# Patient Record
Sex: FEMALE | Race: WHITE | HISPANIC OR LATINO | Employment: FULL TIME | URBAN - METROPOLITAN AREA
[De-identification: names, ages, dates, MRNs, and addresses within clinical notes are randomized per-mention and may not be internally consistent; named-entity substitution may affect disease eponyms.]

---

## 2019-02-08 ENCOUNTER — HOSPITAL ENCOUNTER (EMERGENCY)
Facility: MEDICAL CENTER | Age: 29
End: 2019-02-08
Attending: EMERGENCY MEDICINE
Payer: COMMERCIAL

## 2019-02-08 ENCOUNTER — APPOINTMENT (OUTPATIENT)
Dept: RADIOLOGY | Facility: MEDICAL CENTER | Age: 29
End: 2019-02-08
Payer: COMMERCIAL

## 2019-02-08 ENCOUNTER — APPOINTMENT (OUTPATIENT)
Dept: RADIOLOGY | Facility: MEDICAL CENTER | Age: 29
End: 2019-02-08
Attending: EMERGENCY MEDICINE
Payer: COMMERCIAL

## 2019-02-08 VITALS
DIASTOLIC BLOOD PRESSURE: 120 MMHG | WEIGHT: 160 LBS | RESPIRATION RATE: 19 BRPM | HEIGHT: 64 IN | SYSTOLIC BLOOD PRESSURE: 194 MMHG | HEART RATE: 92 BPM | OXYGEN SATURATION: 98 % | TEMPERATURE: 97.9 F | BODY MASS INDEX: 27.31 KG/M2

## 2019-02-08 DIAGNOSIS — R07.89 CHEST WALL PAIN: ICD-10-CM

## 2019-02-08 DIAGNOSIS — R07.81 PLEURITIC CHEST PAIN: ICD-10-CM

## 2019-02-08 LAB
ALBUMIN SERPL BCP-MCNC: 5 G/DL (ref 3.2–4.9)
ALBUMIN/GLOB SERPL: 1.5 G/DL
ALP SERPL-CCNC: 48 U/L (ref 30–99)
ALT SERPL-CCNC: 12 U/L (ref 2–50)
ANION GAP SERPL CALC-SCNC: 13 MMOL/L (ref 0–11.9)
APTT PPP: 28.6 SEC (ref 24.7–36)
AST SERPL-CCNC: 22 U/L (ref 12–45)
BASOPHILS # BLD AUTO: 0.6 % (ref 0–1.8)
BASOPHILS # BLD: 0.07 K/UL (ref 0–0.12)
BILIRUB SERPL-MCNC: 0.9 MG/DL (ref 0.1–1.5)
BNP SERPL-MCNC: 3 PG/ML (ref 0–100)
BUN SERPL-MCNC: 10 MG/DL (ref 8–22)
CALCIUM SERPL-MCNC: 9.2 MG/DL (ref 8.5–10.5)
CHLORIDE SERPL-SCNC: 104 MMOL/L (ref 96–112)
CO2 SERPL-SCNC: 22 MMOL/L (ref 20–33)
CREAT SERPL-MCNC: 0.66 MG/DL (ref 0.5–1.4)
D DIMER PPP IA.FEU-MCNC: <0.4 UG/ML (FEU) (ref 0–0.5)
EKG IMPRESSION: NORMAL
EKG IMPRESSION: NORMAL
EOSINOPHIL # BLD AUTO: 0.08 K/UL (ref 0–0.51)
EOSINOPHIL NFR BLD: 0.7 % (ref 0–6.9)
ERYTHROCYTE [DISTWIDTH] IN BLOOD BY AUTOMATED COUNT: 46.4 FL (ref 35.9–50)
GLOBULIN SER CALC-MCNC: 3.4 G/DL (ref 1.9–3.5)
GLUCOSE SERPL-MCNC: 119 MG/DL (ref 65–99)
HCG SERPL QL: NEGATIVE
HCT VFR BLD AUTO: 45.4 % (ref 37–47)
HGB BLD-MCNC: 14.7 G/DL (ref 12–16)
IMM GRANULOCYTES # BLD AUTO: 0.04 K/UL (ref 0–0.11)
IMM GRANULOCYTES NFR BLD AUTO: 0.4 % (ref 0–0.9)
INR PPP: 1.01 (ref 0.87–1.13)
LIPASE SERPL-CCNC: 6 U/L (ref 11–82)
LYMPHOCYTES # BLD AUTO: 3.57 K/UL (ref 1–4.8)
LYMPHOCYTES NFR BLD: 32.6 % (ref 22–41)
MCH RBC QN AUTO: 26.9 PG (ref 27–33)
MCHC RBC AUTO-ENTMCNC: 32.4 G/DL (ref 33.6–35)
MCV RBC AUTO: 83 FL (ref 81.4–97.8)
MONOCYTES # BLD AUTO: 0.69 K/UL (ref 0–0.85)
MONOCYTES NFR BLD AUTO: 6.3 % (ref 0–13.4)
NEUTROPHILS # BLD AUTO: 6.49 K/UL (ref 2–7.15)
NEUTROPHILS NFR BLD: 59.4 % (ref 44–72)
NRBC # BLD AUTO: 0 K/UL
NRBC BLD-RTO: 0 /100 WBC
PLATELET # BLD AUTO: 281 K/UL (ref 164–446)
PMV BLD AUTO: 11.8 FL (ref 9–12.9)
POTASSIUM SERPL-SCNC: 3.7 MMOL/L (ref 3.6–5.5)
PROT SERPL-MCNC: 8.4 G/DL (ref 6–8.2)
PROTHROMBIN TIME: 13.4 SEC (ref 12–14.6)
RBC # BLD AUTO: 5.47 M/UL (ref 4.2–5.4)
SODIUM SERPL-SCNC: 139 MMOL/L (ref 135–145)
TROPONIN I SERPL-MCNC: <0.01 NG/ML (ref 0–0.04)
TSH SERPL DL<=0.005 MIU/L-ACNC: 5.76 UIU/ML (ref 0.38–5.33)
WBC # BLD AUTO: 10.9 K/UL (ref 4.8–10.8)

## 2019-02-08 PROCEDURE — 84443 ASSAY THYROID STIM HORMONE: CPT

## 2019-02-08 PROCEDURE — 85730 THROMBOPLASTIN TIME PARTIAL: CPT

## 2019-02-08 PROCEDURE — 83880 ASSAY OF NATRIURETIC PEPTIDE: CPT

## 2019-02-08 PROCEDURE — 93005 ELECTROCARDIOGRAM TRACING: CPT

## 2019-02-08 PROCEDURE — 96374 THER/PROPH/DIAG INJ IV PUSH: CPT

## 2019-02-08 PROCEDURE — 85379 FIBRIN DEGRADATION QUANT: CPT

## 2019-02-08 PROCEDURE — 85025 COMPLETE CBC W/AUTO DIFF WBC: CPT

## 2019-02-08 PROCEDURE — 700111 HCHG RX REV CODE 636 W/ 250 OVERRIDE (IP): Performed by: EMERGENCY MEDICINE

## 2019-02-08 PROCEDURE — 85610 PROTHROMBIN TIME: CPT

## 2019-02-08 PROCEDURE — 84484 ASSAY OF TROPONIN QUANT: CPT

## 2019-02-08 PROCEDURE — 84703 CHORIONIC GONADOTROPIN ASSAY: CPT

## 2019-02-08 PROCEDURE — A9270 NON-COVERED ITEM OR SERVICE: HCPCS | Performed by: EMERGENCY MEDICINE

## 2019-02-08 PROCEDURE — 71046 X-RAY EXAM CHEST 2 VIEWS: CPT

## 2019-02-08 PROCEDURE — 93005 ELECTROCARDIOGRAM TRACING: CPT | Performed by: EMERGENCY MEDICINE

## 2019-02-08 PROCEDURE — 700102 HCHG RX REV CODE 250 W/ 637 OVERRIDE(OP): Performed by: EMERGENCY MEDICINE

## 2019-02-08 PROCEDURE — 80053 COMPREHEN METABOLIC PANEL: CPT

## 2019-02-08 PROCEDURE — 99285 EMERGENCY DEPT VISIT HI MDM: CPT

## 2019-02-08 PROCEDURE — 83690 ASSAY OF LIPASE: CPT

## 2019-02-08 RX ORDER — KETOROLAC TROMETHAMINE 30 MG/ML
15 INJECTION, SOLUTION INTRAMUSCULAR; INTRAVENOUS ONCE
Status: COMPLETED | OUTPATIENT
Start: 2019-02-08 | End: 2019-02-08

## 2019-02-08 RX ORDER — ASPIRIN 81 MG/1
324 TABLET, CHEWABLE ORAL ONCE
Status: COMPLETED | OUTPATIENT
Start: 2019-02-08 | End: 2019-02-08

## 2019-02-08 RX ADMIN — ASPIRIN 81 MG 324 MG: 81 TABLET ORAL at 20:13

## 2019-02-08 RX ADMIN — KETOROLAC TROMETHAMINE 15 MG: 30 INJECTION INTRAMUSCULAR; INTRAVENOUS at 20:13

## 2019-02-08 ASSESSMENT — PAIN DESCRIPTION - DESCRIPTORS: DESCRIPTORS: SHARP;BURNING

## 2019-02-09 NOTE — ED TRIAGE NOTES
"Chief Complaint   Patient presents with   • Chest Pain     upper L side chest/shoulder pain that radiates down upper back     Pt ambulatory to R6 with family at side. Pt states the pain started around 1900 last night. Pt states it feels like \"pressure on my chest when I lay back\". Pt c/o tightness in her chest as well. Pt smokes a pack/3days. Pt states pain is getting tighter.     "

## 2019-02-09 NOTE — ED NOTES
piv dc. Bleeding controlled. Given dc inst. No rx. Verbalizes f/u plan. Ambulatory to cuauhtemoc fu steady gait c family.

## 2019-02-09 NOTE — ED PROVIDER NOTES
ED Provider Note    CHIEF COMPLAINT  Chief Complaint   Patient presents with   • Chest Pain     upper L side chest/shoulder pain that radiates down upper back       HPI  Melina Mcmahan is a 28 y.o. female who presents to the emergency department chief complaint of a day and a half of left chest discomfort that radiates up to the shoulder.  She associates this with the pleuritic discomfort and worsening pain with a deep breath.  She does also associate worsening pain with movement.  She denies any recent trauma to the area.  She denies any leg swelling.  She denies any overt shortness of breath but feels like her heart is racing.  She denies any hemoptysis or recent surgeries.  She does currently smoke cigarettes about a pack a week.  States she was diagnosed with hypertension the past but does not take any medications for this.  Did not take anything prior to arrival besides Motrin that did not help with the discomfort.  Pain is currently 7 out of 10 and sharp in nature.    REVIEW OF SYSTEMS  Positives as above. Pertinent negatives include nausea vomiting leg swelling hemoptysis fevers chills recent illness trauma  All other review of systems are negative    PAST MEDICAL HISTORY   has a past medical history of Hypertension ([id number]).    SOCIAL HISTORY  Social History     Social History Main Topics   • Smoking status: Current Some Day Smoker     Packs/day: 0.25     Years: 3.00     Types: Cigarettes     Last attempt to quit: 3/19/2013   • Smokeless tobacco: Never Used      Comment: 1 cig dialy    • Alcohol use No   • Drug use: No   • Sexual activity: Yes     Partners: Male     Birth control/ protection: Condom, Pill      Comment: FOB involved and supportive.       SURGICAL HISTORY   has a past surgical history that includes other abdominal surgery; dilation and curettage (2013); primary c section (2006); and repeat c section w tubal ligation (2/20/2014).    CURRENT MEDICATIONS  Home Medications    **Home  "medications have not yet been reviewed for this encounter**         ALLERGIES  Allergies   Allergen Reactions   • Nkda [No Known Drug Allergy]        PHYSICAL EXAM  VITAL SIGNS: BP (!) 194/120   Pulse (!) 116   Temp 36.6 °C (97.9 °F) (Temporal)   Resp 19   Ht 1.626 m (5' 4\")   Wt 72.6 kg (160 lb)   SpO2 99%   BMI 27.46 kg/m²    Pulse ox interpretation: I interpret this pulse ox as normal.  Constitutional: Alert in mild distress  HENT: Normocephalic atraumatic, MMM  Eyes: PER, Conjunctiva normal, Non-icteric.   Neck: Normal range of motion, No tenderness, Supple, No stridor.   Cardiovascular: Regular rhythm, tachycardia no murmurs.   Thorax & Lungs: Normal breath sounds, No respiratory distress, No wheezing, L chest wall ttp on exam,, reproducible of pain   Abdomen: Bowel sounds normal, Soft, No tenderness, No pulsatile masses. No peritoneal signs.  Skin: Warm, Dry, No erythema, No rash.   Back: No bony tenderness, No CVA tenderness.   Extremities: Intact distal pulses, No edema, No tenderness, No cyanosis  Neurologic: Alert and oriented x3, No focal deficits noted.       DIFFERENTIAL DIAGNOSIS AND WORK UP PLAN    This is a 28 y.o. female who presents with left-sided pleuritic chest pain in the setting of tachycardia and hypertension concern for atypical ACS pulmonary embolism costochondritis pleural effusion spontaneous pneumothorax.  She is now hypoxic and otherwise well-appearing.  Because she is tachycardic cannot rule her out by PERC criteria she will receive a d-dimer aspirin pain management and reevaluation.    DIAGNOSTIC STUDIES / PROCEDURES    EKG  Results for orders placed or performed during the hospital encounter of 02/08/19   EKG (NOW)   Result Value Ref Range    Report       [facility] Emergency Dept.    Test Date:  2019-02-08  Pt Name:    MADDY FERREIRA                Department: ER  MRN:        [id number]                      Room:  Gender:     Female                       " Technician: [id number]  :        1990                   Requested By:ER TRIAGE PROTOCOL  Order #:    [id number]                    Reading MD: Reshma Soto MD    Measurements  Intervals                                Axis  Rate:       122                          P:          42  DE:         132                          QRS:        16  QRSD:       120                          T:          208  QT:         316  QTc:        451    Interpretive Statements  SINUS TACHYCARDIA  No ST elevation she does have ST depressions in the inferior lateral leads.  No Q  waves questional ST elevation in aVR normal intervals normal axis  No previous ECG available for comparison    Electronically Signed On 2019 20:29:13 PST by Reshma Soto MD     EKG (NOW)   Result Value Ref Range    Report       [facility] Emergency Dept.    Test Date:  2019  Pt Name:    MADDY FERREIRA                Department: ER  MRN:        [id number]                      Room:       [facility]  Gender:     Female                       Technician: [id number]  :        1990                   Requested By:RESHMA SOTO  Order #:    [id number]                    Reading MD:    Measurements  Intervals                                Axis  Rate:       96                           P:          50  DE:         100                          QRS:        14  QRSD:       108                          T:          232  QT:         380  QTc:        481    Interpretive Statements  SINUS RHYTHM  VENTRICULAR PREEXCITATION  BASELINE WANDER IN LEAD(S) V2  Compared to ECG 2019 19:23:13  Ventricular preexcitation now present  Sinus tachycardia no longer present  ST (T wave) deviation no longer present           LABS  Pertinent Lab Findings  CBC within normal limits panel mildly elevated white blood cell count CMP within normal limits lipase normal TSH only mildly elevated normal troponin BMP coags and d-dimer the patient is not  "pregnant      RADIOLOGY  DX-CHEST-2 VIEWS   Final Result      Normal chest.               INTERPRETING LOCATION: [address] [place] [address], [id number]        The radiologist's interpretation of all radiological studies have been reviewed by me.      COURSE & MEDICAL DECISION MAKING  Pertinent Labs & Imaging studies reviewed. (See chart for details)    9:13 PM  Reassess patient at the bedside she is feeling much better after the IV Toradol her heart rate and her blood pressure improved.  Her heart score is 3 there is no evidence of pulmonary embolism her pain is reproducible and improved with the NSAIDs.  I discussed with her continue ice packs and NSAIDs at home and return to the ED for any worsening symptoms and follow-up with primary care provider for elevated blood pressure    /97   Pulse 92   Temp 36.6 °C (97.9 °F) (Temporal)   Resp 19   Ht 1.626 m (5' 4\")   Wt 72.6 kg (160 lb)   SpO2 98%   BMI 27.46 kg/m²       The patient will return for new or worsening symptoms and is stable at the time of discharge.    The patient is referred to a primary physician for blood pressure management, diabetic screening, and for all other preventative health concerns.    DISPOSITION:  Patient will be discharged home in stable condition.    FOLLOW UP:  [address] [address]  [phone]  Schedule an appointment as soon as possible for a visit   for blood pressure and thyroid recheck    [facility], Emergency Dept  [address] [place] [id number] [phone]    If symptoms worsen      OUTPATIENT MEDICATIONS:  Discharge Medication List as of 2/8/2019  9:23 PM          FINAL IMPRESSION  1. Pleuritic chest pain    2. Chest wall pain            Electronically signed by: Reshma Yeboah, 2/8/2019 8:01 PM    This dictation has been created using voice recognition software and/or scribes. The accuracy of the dictation is limited by the abilities of the software " and the expertise of the scribes. I expect there may be some errors of grammar and possibly content. I made every attempt to manually correct the errors within my dictation. However, errors related to voice recognition software and/or scribes may still exist and should be interpreted within the appropriate context.

## 2019-02-09 NOTE — ED NOTES
Pt ambulatory from triage, states pain started last night while at rest. Pt ST @115-125. States per her apple watch that is her normal resting HR. Denies any beck. Denies any diaphoresis or n/v at time of onset. Pt states pain is worse laying back. Placed on monitor. piv estb, labs drawn & sent. Family at bs. Call light inreach. Tbs. Will ctm.

## 2019-02-11 ENCOUNTER — APPOINTMENT (OUTPATIENT)
Dept: RADIOLOGY | Facility: MEDICAL CENTER | Age: 29
End: 2019-02-11
Attending: EMERGENCY MEDICINE
Payer: COMMERCIAL

## 2019-02-11 ENCOUNTER — HOSPITAL ENCOUNTER (EMERGENCY)
Facility: MEDICAL CENTER | Age: 29
End: 2019-02-11
Attending: EMERGENCY MEDICINE
Payer: COMMERCIAL

## 2019-02-11 VITALS
HEIGHT: 64 IN | TEMPERATURE: 98.4 F | SYSTOLIC BLOOD PRESSURE: 176 MMHG | OXYGEN SATURATION: 97 % | BODY MASS INDEX: 29.81 KG/M2 | DIASTOLIC BLOOD PRESSURE: 104 MMHG | HEART RATE: 99 BPM | WEIGHT: 174.6 LBS | RESPIRATION RATE: 19 BRPM

## 2019-02-11 DIAGNOSIS — H20.9 IRITIS OF LEFT EYE: ICD-10-CM

## 2019-02-11 DIAGNOSIS — I10 ESSENTIAL HYPERTENSION: ICD-10-CM

## 2019-02-11 LAB
ALBUMIN SERPL BCP-MCNC: 5 G/DL (ref 3.2–4.9)
ALBUMIN/GLOB SERPL: 1.8 G/DL
ALP SERPL-CCNC: 53 U/L (ref 30–99)
ALT SERPL-CCNC: 11 U/L (ref 2–50)
AMPHET UR QL SCN: NEGATIVE
ANION GAP SERPL CALC-SCNC: 10 MMOL/L (ref 0–11.9)
AST SERPL-CCNC: 17 U/L (ref 12–45)
BARBITURATES UR QL SCN: NEGATIVE
BASOPHILS # BLD AUTO: 0.6 % (ref 0–1.8)
BASOPHILS # BLD: 0.05 K/UL (ref 0–0.12)
BENZODIAZ UR QL SCN: NEGATIVE
BILIRUB SERPL-MCNC: 0.5 MG/DL (ref 0.1–1.5)
BNP SERPL-MCNC: 19 PG/ML (ref 0–100)
BUN SERPL-MCNC: 7 MG/DL (ref 8–22)
BZE UR QL SCN: NEGATIVE
CALCIUM SERPL-MCNC: 9.8 MG/DL (ref 8.5–10.5)
CANNABINOIDS UR QL SCN: NEGATIVE
CHLORIDE SERPL-SCNC: 105 MMOL/L (ref 96–112)
CO2 SERPL-SCNC: 22 MMOL/L (ref 20–33)
CREAT SERPL-MCNC: 0.62 MG/DL (ref 0.5–1.4)
D DIMER PPP IA.FEU-MCNC: <0.4 UG/ML (FEU) (ref 0–0.5)
EOSINOPHIL # BLD AUTO: 0.07 K/UL (ref 0–0.51)
EOSINOPHIL NFR BLD: 0.9 % (ref 0–6.9)
ERYTHROCYTE [DISTWIDTH] IN BLOOD BY AUTOMATED COUNT: 46.2 FL (ref 35.9–50)
GLOBULIN SER CALC-MCNC: 2.8 G/DL (ref 1.9–3.5)
GLUCOSE SERPL-MCNC: 111 MG/DL (ref 65–99)
HCG SERPL QL: NEGATIVE
HCT VFR BLD AUTO: 43 % (ref 37–47)
HGB BLD-MCNC: 13.9 G/DL (ref 12–16)
IMM GRANULOCYTES # BLD AUTO: 0.02 K/UL (ref 0–0.11)
IMM GRANULOCYTES NFR BLD AUTO: 0.3 % (ref 0–0.9)
LYMPHOCYTES # BLD AUTO: 2.95 K/UL (ref 1–4.8)
LYMPHOCYTES NFR BLD: 37.3 % (ref 22–41)
MCH RBC QN AUTO: 27 PG (ref 27–33)
MCHC RBC AUTO-ENTMCNC: 32.3 G/DL (ref 33.6–35)
MCV RBC AUTO: 83.7 FL (ref 81.4–97.8)
METHADONE UR QL SCN: NEGATIVE
MONOCYTES # BLD AUTO: 0.64 K/UL (ref 0–0.85)
MONOCYTES NFR BLD AUTO: 8.1 % (ref 0–13.4)
NEUTROPHILS # BLD AUTO: 4.18 K/UL (ref 2–7.15)
NEUTROPHILS NFR BLD: 52.8 % (ref 44–72)
NRBC # BLD AUTO: 0 K/UL
NRBC BLD-RTO: 0 /100 WBC
OPIATES UR QL SCN: NEGATIVE
OXYCODONE UR QL SCN: NEGATIVE
PCP UR QL SCN: NEGATIVE
PLATELET # BLD AUTO: 275 K/UL (ref 164–446)
PMV BLD AUTO: 11.2 FL (ref 9–12.9)
POTASSIUM SERPL-SCNC: 3.1 MMOL/L (ref 3.6–5.5)
PROPOXYPH UR QL SCN: NEGATIVE
PROT SERPL-MCNC: 7.8 G/DL (ref 6–8.2)
RBC # BLD AUTO: 5.14 M/UL (ref 4.2–5.4)
SODIUM SERPL-SCNC: 137 MMOL/L (ref 135–145)
TROPONIN I SERPL-MCNC: <0.01 NG/ML (ref 0–0.04)
TSH SERPL DL<=0.005 MIU/L-ACNC: 1.99 UIU/ML (ref 0.38–5.33)
WBC # BLD AUTO: 7.9 K/UL (ref 4.8–10.8)

## 2019-02-11 PROCEDURE — 70496 CT ANGIOGRAPHY HEAD: CPT

## 2019-02-11 PROCEDURE — 99284 EMERGENCY DEPT VISIT MOD MDM: CPT

## 2019-02-11 PROCEDURE — 84484 ASSAY OF TROPONIN QUANT: CPT

## 2019-02-11 PROCEDURE — 84703 CHORIONIC GONADOTROPIN ASSAY: CPT

## 2019-02-11 PROCEDURE — 700117 HCHG RX CONTRAST REV CODE 255: Performed by: EMERGENCY MEDICINE

## 2019-02-11 PROCEDURE — 80053 COMPREHEN METABOLIC PANEL: CPT

## 2019-02-11 PROCEDURE — 36415 COLL VENOUS BLD VENIPUNCTURE: CPT

## 2019-02-11 PROCEDURE — 84443 ASSAY THYROID STIM HORMONE: CPT

## 2019-02-11 PROCEDURE — 83880 ASSAY OF NATRIURETIC PEPTIDE: CPT

## 2019-02-11 PROCEDURE — 85379 FIBRIN DEGRADATION QUANT: CPT

## 2019-02-11 PROCEDURE — 80307 DRUG TEST PRSMV CHEM ANLYZR: CPT

## 2019-02-11 PROCEDURE — 85025 COMPLETE CBC W/AUTO DIFF WBC: CPT

## 2019-02-11 RX ADMIN — IOHEXOL 100 ML: 350 INJECTION, SOLUTION INTRAVENOUS at 18:59

## 2019-02-11 ASSESSMENT — LIFESTYLE VARIABLES: DO YOU DRINK ALCOHOL: NO

## 2019-02-12 ENCOUNTER — TELEPHONE (OUTPATIENT)
Dept: SCHEDULING | Facility: IMAGING CENTER | Age: 29
End: 2019-02-12

## 2019-02-12 NOTE — ED TRIAGE NOTES
Chief Complaint   Patient presents with   • Blood Pressure Problem     Went to optometry and sent to ED for further evaluation   • Loss of Vision     transient vision loss in left eye affecting one corner of visual field. Currently states vision intact, c/o left eye discomfort     Ambulatory to triage for above. Hypertensive otherwise VSS. Explained triage process, to waiting room. Asked to inform RN if questions or concerns arise.

## 2019-02-12 NOTE — ED PROVIDER NOTES
ED Provider Note    CHIEF COMPLAINT  Chief Complaint   Patient presents with   • Blood Pressure Problem     Went to optometry and sent to ED for further evaluation   • Loss of Vision     transient vision loss in left eye affecting one corner of visual field. Currently states vision intact, c/o left eye discomfort       HPI  Melina Mcmahan is a 28 y.o. female who presents for evaluation of several complaints including high blood pressure, left eye pain with left eye lateral visual field deficit.  Patient has no apparent stated medical or surgical history.  She was seen here yesterday for atypical chest pain had an extensive workup including cardiac enzymes d-dimer chest x-ray which are all unremarkable.  She developed some symptoms in her left eye and saw her optometrist.  Apparently she had a dilated exam there was apparently suggestion of iritis in the left eye.  She was also noted to be profoundly tachycardic and hypertensive and referred here.  She reports no ongoing chest pain.  She does report mild headache.  No high fevers or chills.  No numbness weakness or tingling to the arms legs or face.  She does not wear contact lenses has no ocular surgical history.    REVIEW OF SYSTEMS  See HPI for further details.  No night sweats weight loss numbness tingling weakness rash all other systems are negative.     PAST MEDICAL HISTORY  Past Medical History:   Diagnosis Date   • Hypertension [id number]    put on bedrest at 4mths       FAMILY HISTORY  No history of sudden cardiac death    SOCIAL HISTORY  Social History     Social History   • Marital status:      Spouse name: N/A   • Number of children: N/A   • Years of education: N/A     Social History Main Topics   • Smoking status: Current Some Day Smoker     Packs/day: 0.25     Years: 3.00     Types: Cigarettes     Last attempt to quit: 3/19/2013   • Smokeless tobacco: Never Used      Comment: 1 cig dialy    • Alcohol use No   • Drug use: No   • Sexual  "activity: Yes     Partners: Male     Birth control/ protection: Condom, Pill      Comment: FOB involved and supportive.     Other Topics Concern   • Not on file     Social History Narrative   • No narrative on file   Denies IV drug    SURGICAL HISTORY  Past Surgical History:   Procedure Laterality Date   • REPEAT C SECTION W TUBAL LIGATION  2/20/2014    Performed by Barbara Velasco M.D. at [facility] DEL[facility]   • DILATION AND CURETTAGE  2013    D & C   • PRIMARY C SECTION  2006    position of baby, face first    • OTHER ABDOMINAL SURGERY      c section       CURRENT MEDICATIONS  Home Medications    **Home medications have not yet been reviewed for this encounter**     No regular meds    ALLERGIES  Allergies   Allergen Reactions   • Nkda [No Known Drug Allergy]        PHYSICAL EXAM  VITAL SIGNS: BP (!) 176/104   Pulse (!) 107   Temp 37 °C (98.6 °F)   Resp 17   Ht 1.626 m (5' 4\")   Wt 79.2 kg (174 lb 9.7 oz)   SpO2 96%   BMI 29.97 kg/m²       Constitutional: Well developed, Well nourished, No acute distress, Non-toxic appearance.   HENT: Normocephalic, Atraumatic, Bilateral external ears normal, Oropharynx moist, No oral exudates, Nose normal.   Eyes: PERRLA, EOMI, Conjunctiva normal, No discharge.   Neck: Normal range of motion, No tenderness, Supple, No stridor.   Lymphatic: No lymphadenopathy noted.   Cardiovascular: Tachycardic, Normal rhythm, No murmurs, No rubs, No gallops.   Thorax & Lungs: Normal breath sounds, No respiratory distress, No wheezing, No chest tenderness.   Abdomen: Bowel sounds normal, Soft, No tenderness, No masses, No pulsatile masses.   Skin: Warm, Dry, No erythema, No rash.   Back: No tenderness, No CVA tenderness.   Extremities: Intact distal pulses, No edema, No tenderness, No cyanosis, No clubbing.   Neurologic: Alert & oriented x 3, Normal motor function, Normal sensory function, No focal deficits noted.   Psychiatric: Anxious    Results for orders placed or performed " during the hospital encounter of 02/11/19   HCG QUAL SERUM   Result Value Ref Range    Beta-Hcg Qualitative Serum Negative Negative   CBC WITH DIFFERENTIAL   Result Value Ref Range    WBC 7.9 4.8 - 10.8 K/uL    RBC 5.14 4.20 - 5.40 M/uL    Hemoglobin 13.9 12.0 - 16.0 g/dL    Hematocrit 43.0 37.0 - 47.0 %    MCV 83.7 81.4 - 97.8 fL    MCH 27.0 27.0 - 33.0 pg    MCHC 32.3 (L) 33.6 - 35.0 g/dL    RDW 46.2 35.9 - 50.0 fL    Platelet Count 275 164 - 446 K/uL    MPV 11.2 9.0 - 12.9 fL    Neutrophils-Polys 52.80 44.00 - 72.00 %    Lymphocytes 37.30 22.00 - 41.00 %    Monocytes 8.10 0.00 - 13.40 %    Eosinophils 0.90 0.00 - 6.90 %    Basophils 0.60 0.00 - 1.80 %    Immature Granulocytes 0.30 0.00 - 0.90 %    Nucleated RBC 0.00 /100 WBC    Neutrophils (Absolute) 4.18 2.00 - 7.15 K/uL    Lymphs (Absolute) 2.95 1.00 - 4.80 K/uL    Monos (Absolute) 0.64 0.00 - 0.85 K/uL    Eos (Absolute) 0.07 0.00 - 0.51 K/uL    Baso (Absolute) 0.05 0.00 - 0.12 K/uL    Immature Granulocytes (abs) 0.02 0.00 - 0.11 K/uL    NRBC (Absolute) 0.00 K/uL   Comp Metabolic Panel   Result Value Ref Range    Sodium 137 135 - 145 mmol/L    Potassium 3.1 (L) 3.6 - 5.5 mmol/L    Chloride 105 96 - 112 mmol/L    Co2 22 20 - 33 mmol/L    Anion Gap 10.0 0.0 - 11.9    Glucose 111 (H) 65 - 99 mg/dL    Bun 7 (L) 8 - 22 mg/dL    Creatinine 0.62 0.50 - 1.40 mg/dL    Calcium 9.8 8.5 - 10.5 mg/dL    AST(SGOT) 17 12 - 45 U/L    ALT(SGPT) 11 2 - 50 U/L    Alkaline Phosphatase 53 30 - 99 U/L    Total Bilirubin 0.5 0.1 - 1.5 mg/dL    Albumin 5.0 (H) 3.2 - 4.9 g/dL    Total Protein 7.8 6.0 - 8.2 g/dL    Globulin 2.8 1.9 - 3.5 g/dL    A-G Ratio 1.8 g/dL   D-DIMER   Result Value Ref Range    D-Dimer Screen <0.40 0.00 - 0.50 ug/mL (FEU)   BTYPE NATRIURETIC PEPTIDE   Result Value Ref Range    B Natriuretic Peptide 19 0 - 100 pg/mL   TROPONIN   Result Value Ref Range    Troponin I <0.01 0.00 - 0.04 ng/mL   TSH   Result Value Ref Range    TSH 1.990 0.380 - 5.330 uIU/mL   URINE  DRUG SCREEN   Result Value Ref Range    Amphetamines Urine Negative Negative    Barbiturates Negative Negative    Benzodiazepines Negative Negative    Cocaine Metabolite Negative Negative    Methadone Negative Negative    Opiates Negative Negative    Oxycodone Negative Negative    Phencyclidine -Pcp Negative Negative    Propoxyphene Negative Negative    Cannabinoid Metab Negative Negative   ESTIMATED GFR   Result Value Ref Range    GFR If African American >60 >60 mL/min/1.73 m 2    GFR If Non African American >60 >60 mL/min/1.73 m 2      CT-CTA HEAD WITH & W/O-POST PROCESS   Final Result      1.  No acute intracranial findings.      2.  No large vessel occlusion or aneurysm identified.            COURSE & MEDICAL DECISION MAKING  Pertinent Labs & Imaging studies reviewed. (See chart for details)  An IV was established.  I reviewed the patient's prior records.  This is a rather odd presentation.  She was hypertensive and moderately tachycardic.  Extensive workup was performed.  Urine tox is negative for any stimulant drugs.  Thyroid studies are normal.  D-dimer was again tested and is again normal.  She had atypical type headache and therefore CT angiogram was performed to rule out aneurysm subarachnoid hemorrhage or brain mass.  All of this is normal.  Her blood pressure and heart rate also came down.  I counseled the patient that we did an extensive workup.  She will need follow-up with her eye doctor and she will be referred to the [facility] clinic    FINAL IMPRESSION  1.  Hypertension  2.  Headache  3.  Left eye iritis         Electronically signed by: Van Rausch, 2/11/2019 7:05 PM

## 2019-03-18 ENCOUNTER — OFFICE VISIT (OUTPATIENT)
Dept: INTERNAL MEDICINE | Facility: MEDICAL CENTER | Age: 29
End: 2019-03-18
Payer: COMMERCIAL

## 2019-03-18 VITALS
BODY MASS INDEX: 28.77 KG/M2 | DIASTOLIC BLOOD PRESSURE: 94 MMHG | TEMPERATURE: 97.6 F | WEIGHT: 179 LBS | SYSTOLIC BLOOD PRESSURE: 167 MMHG | HEART RATE: 98 BPM | OXYGEN SATURATION: 99 % | HEIGHT: 66 IN

## 2019-03-18 DIAGNOSIS — Z87.59 HISTORY OF ABORTION: ICD-10-CM

## 2019-03-18 DIAGNOSIS — I10 HYPERTENSION, UNSPECIFIED TYPE: ICD-10-CM

## 2019-03-18 DIAGNOSIS — E87.6 HYPOKALEMIA: ICD-10-CM

## 2019-03-18 DIAGNOSIS — H43.392 VITREOUS FLOATERS OF LEFT EYE: ICD-10-CM

## 2019-03-18 PROCEDURE — 99204 OFFICE O/P NEW MOD 45 MIN: CPT | Mod: GC | Performed by: INTERNAL MEDICINE

## 2019-03-18 RX ORDER — LISINOPRIL 5 MG/1
5 TABLET ORAL DAILY
Qty: 30 TAB | Refills: 5 | Status: ON HOLD | OUTPATIENT
Start: 2019-03-18 | End: 2020-11-03

## 2019-03-18 ASSESSMENT — PATIENT HEALTH QUESTIONNAIRE - PHQ9: CLINICAL INTERPRETATION OF PHQ2 SCORE: 0

## 2019-03-18 NOTE — PROGRESS NOTES
New Patient to Establish    Reason to establish: New patient to establish    CC: Hypertension    HPI: Melina Mcmahan is a 28 y.o. female with past medical history of hypertension was diagnosed during the pregnancy at the age of 16-year-old, recently visited the emergency room for left-sided chest pain, found with hypertension at that time and referred for primary care work-up.  Also patient complained of left-eye floaters.  Patient reported history of 2 abortions, hypertension without preeclampsia during first pregnancy at age of 16, no hypertension with the following the pregnancies. she has 4 kids last 1 at the age of 5, she had 2 abortions, patient reported recent weight gain, and change in menstrual cycle described as more of a regular and heavy.  Patient denies using NSAIDs, dark hair facial growth, or palpitations.    Review of Systems:     Constitutional: Denies fevers,  Eyes: Patient reported floaters on the left eye, denies eye pain, congestion, back pain or previous history of connective tissue disease  Ears/Nose/Throat/Mouth: Denies nasal congestion or sore throat   Cardiovascular: Denies chest pain or palpitations   Respiratory: Denies shortness of breath , Denies cough  Gastrointestinal/Hepatic: Denies abdominal pain, nausea, vomiting, diarrhea or constipation.  Genitourinary: Denies bladder dysfunction, dysuria or frequency  Musculoskeletal/Rheum: Denies  joint pain and swelling   Skin: Denies rash.  Neurological: Denies headache, confusion, memory loss or focal weakness/parasthesias  Psychiatric: denies mood disorder         Patient Active Problem List    Diagnosis Date Noted   • Labor and delivery, indication for care 2014   • Active labor 2014   • History of  delivery 2014   • Supervision of normal IUP (intrauterine pregnancy) in multigravida 2014   • Previous  delivery, antepartum condition or complication 2013   • Supervision of other high-risk  pregnancy 09/19/2013       Past Medical History:   Diagnosis Date   • Hypertension [id number]    put on bedrest at 4mths       Current Outpatient Prescriptions   Medication Sig Dispense Refill   • lisinopril (PRINIVIL) 5 MG Tab Take 1 Tab by mouth every day. 30 Tab 5   • prenatal vit/fe fumarate/fa (PRENATAL S) 27-0.8 MG TABS Take 1 Tab by mouth every morning.       No current facility-administered medications for this visit.        Allergies as of 03/18/2019 - Reviewed 03/18/2019   Allergen Reaction Noted   • Nkda [no known drug allergy]  12/22/2007       Social History     Social History   • Marital status:      Spouse name: N/A   • Number of children: N/A   • Years of education: N/A     Occupational History   • Not on file.     Social History Main Topics   • Smoking status: Current Some Day Smoker     Packs/day: 0.25     Years: 3.00     Types: Cigarettes     Last attempt to quit: 3/19/2013   • Smokeless tobacco: Never Used      Comment: 1 cig dialy    • Alcohol use No   • Drug use: No   • Sexual activity: Yes     Partners: Male     Birth control/ protection: Condom, Pill      Comment: FOB involved and supportive.     Other Topics Concern   • Not on file     Social History Narrative   • No narrative on file       Family History   Problem Relation Age of Onset   • Cancer Mother         unknown   • Diabetes Father         diet and pills   • Hypertension Father    • Diabetes Sister         eldest sister, controlled with diet and pills   • Other Paternal Grandmother         kidney transplant       Past Surgical History:   Procedure Laterality Date   • REPEAT C SECTION W TUBAL LIGATION  2/20/2014    Performed by Barbara Velasco M.D. at [facility]   • DILATION AND CURETTAGE  2013    D & C   • PRIMARY C SECTION  2006    position of baby, face first    • OTHER ABDOMINAL SURGERY      c section         BP (!) 167/94 (BP Location: Right arm, Patient Position: Sitting)   Pulse 98   Temp 36.4 °C (97.6  "°F) (Temporal)   Ht 1.676 m (5' 6\")   Wt 81.2 kg (179 lb)   LMP 2019   SpO2 99%   Breastfeeding? No   BMI 28.89 kg/m²     Physical Exam  General:  Alert and oriented, No apparent distress.  Eyes: Pupils equal and reactive. No scleral icterus.  Throat: Clear no erythema or exudates noted.  Neck: Supple. No lymphadenopathy noted. Thyroid not enlarged.  Lungs: Clear to auscultation bilaterally. No wheezes, rhonchi or crackles.  Cardiovascular: Regular rate and rhythm. No murmurs, rubs or gallops.  Abdomen: Stria soft, non tender, non distended. Bowel sounds positive.  Unable to appreciate any abdomina renal artery bruit  Extremities: No clubbing, cyanosis, edema.  Skin: Clear. No rash or suspicious skin lesions noted.    Assessment and Plan  1. Hypertension, unspecified type  Investigation for secondary causes of hypertension  - RENIN ACTIVITY AND ALDOSTERONE  - CORTISOL - AM  - US-RENAL ARTERY DUPLEX COMP; Future  - METANEPHRINES, URINE RANDOM OR 24 HR; Future  - Started lisinopril (PRINIVIL) 5 MG Tab; Take 1 Tab by mouth every day.  Dispense: 30 Tab; Refill: 5    2. Vitreous floaters of left eye questionable iritis per ED visit  Patient has appointment with ophthalmologist next month    3. Hypokalemia  - RENIN ACTIVITY AND ALDOSTERONE  - CORTISOL - AM  - US-RENAL ARTERY DUPLEX COMP; Future    4. History of   5.  Preventive care  Women only  - Pap -next office visit    Signed by: Jane Smith M.D.    "

## 2019-03-22 LAB
ALDOST SERPL-MCNC: 12.5 NG/DL (ref 0–30)
CORTIS AM PEAK SERPL-MCNC: 8.4 UG/DL (ref 6.2–19.4)
RENIN PLAS-CCNC: 2.16 NG/ML/HR (ref 0.17–5.38)

## 2019-03-28 LAB
METANEPH 24H UR-MRATE: 136 UG/24 HR (ref 45–290)
METANEPHS 24H UR-MCNC: 68 UG/L
NORMETANEPHRINE 24H UR-MCNC: 201 UG/L
NORMETANEPHRINE 24H UR-MRATE: 402 UG/24 HR (ref 82–500)

## 2019-04-23 ENCOUNTER — APPOINTMENT (OUTPATIENT)
Dept: INTERNAL MEDICINE | Facility: MEDICAL CENTER | Age: 29
End: 2019-04-23
Payer: COMMERCIAL

## 2020-01-02 ENCOUNTER — APPOINTMENT (OUTPATIENT)
Dept: RADIOLOGY | Facility: MEDICAL CENTER | Age: 30
End: 2020-01-02
Payer: COMMERCIAL

## 2020-01-02 ENCOUNTER — HOSPITAL ENCOUNTER (EMERGENCY)
Facility: MEDICAL CENTER | Age: 30
End: 2020-01-02
Attending: EMERGENCY MEDICINE
Payer: COMMERCIAL

## 2020-01-02 VITALS
HEART RATE: 85 BPM | OXYGEN SATURATION: 97 % | TEMPERATURE: 99.3 F | SYSTOLIC BLOOD PRESSURE: 147 MMHG | HEIGHT: 64 IN | BODY MASS INDEX: 31.73 KG/M2 | WEIGHT: 185.85 LBS | RESPIRATION RATE: 18 BRPM | DIASTOLIC BLOOD PRESSURE: 89 MMHG

## 2020-01-02 DIAGNOSIS — I10 HYPERTENSION, UNSPECIFIED TYPE: ICD-10-CM

## 2020-01-02 LAB
ALBUMIN SERPL BCP-MCNC: 4.4 G/DL (ref 3.2–4.9)
ALBUMIN/GLOB SERPL: 1.3 G/DL
ALP SERPL-CCNC: 59 U/L (ref 30–99)
ALT SERPL-CCNC: 9 U/L (ref 2–50)
ANION GAP SERPL CALC-SCNC: 15 MMOL/L (ref 0–11.9)
AST SERPL-CCNC: 16 U/L (ref 12–45)
BASOPHILS # BLD AUTO: 0.6 % (ref 0–1.8)
BASOPHILS # BLD: 0.05 K/UL (ref 0–0.12)
BILIRUB SERPL-MCNC: 0.3 MG/DL (ref 0.1–1.5)
BUN SERPL-MCNC: 11 MG/DL (ref 8–22)
CALCIUM SERPL-MCNC: 9.4 MG/DL (ref 8.4–10.2)
CHLORIDE SERPL-SCNC: 103 MMOL/L (ref 96–112)
CO2 SERPL-SCNC: 21 MMOL/L (ref 20–33)
CREAT SERPL-MCNC: 0.6 MG/DL (ref 0.5–1.4)
EKG IMPRESSION: NORMAL
EOSINOPHIL # BLD AUTO: 0.05 K/UL (ref 0–0.51)
EOSINOPHIL NFR BLD: 0.6 % (ref 0–6.9)
ERYTHROCYTE [DISTWIDTH] IN BLOOD BY AUTOMATED COUNT: 43.6 FL (ref 35.9–50)
GLOBULIN SER CALC-MCNC: 3.4 G/DL (ref 1.9–3.5)
GLUCOSE SERPL-MCNC: 126 MG/DL (ref 65–99)
HCT VFR BLD AUTO: 41.2 % (ref 37–47)
HGB BLD-MCNC: 13.3 G/DL (ref 12–16)
IMM GRANULOCYTES # BLD AUTO: 0.03 K/UL (ref 0–0.11)
IMM GRANULOCYTES NFR BLD AUTO: 0.3 % (ref 0–0.9)
LYMPHOCYTES # BLD AUTO: 2.49 K/UL (ref 1–4.8)
LYMPHOCYTES NFR BLD: 28.7 % (ref 22–41)
MCH RBC QN AUTO: 26.1 PG (ref 27–33)
MCHC RBC AUTO-ENTMCNC: 32.3 G/DL (ref 33.6–35)
MCV RBC AUTO: 80.8 FL (ref 81.4–97.8)
MONOCYTES # BLD AUTO: 0.45 K/UL (ref 0–0.85)
MONOCYTES NFR BLD AUTO: 5.2 % (ref 0–13.4)
NEUTROPHILS # BLD AUTO: 5.62 K/UL (ref 2–7.15)
NEUTROPHILS NFR BLD: 64.6 % (ref 44–72)
NRBC # BLD AUTO: 0 K/UL
NRBC BLD-RTO: 0 /100 WBC
PLATELET # BLD AUTO: 284 K/UL (ref 164–446)
PMV BLD AUTO: 11.3 FL (ref 9–12.9)
POTASSIUM SERPL-SCNC: 3.8 MMOL/L (ref 3.6–5.5)
PROT SERPL-MCNC: 7.8 G/DL (ref 6–8.2)
RBC # BLD AUTO: 5.1 M/UL (ref 4.2–5.4)
SODIUM SERPL-SCNC: 139 MMOL/L (ref 135–145)
TROPONIN T SERPL-MCNC: <6 NG/L (ref 6–19)
WBC # BLD AUTO: 8.7 K/UL (ref 4.8–10.8)

## 2020-01-02 PROCEDURE — 80053 COMPREHEN METABOLIC PANEL: CPT

## 2020-01-02 PROCEDURE — 71045 X-RAY EXAM CHEST 1 VIEW: CPT

## 2020-01-02 PROCEDURE — 93005 ELECTROCARDIOGRAM TRACING: CPT

## 2020-01-02 PROCEDURE — 85025 COMPLETE CBC W/AUTO DIFF WBC: CPT

## 2020-01-02 PROCEDURE — 84484 ASSAY OF TROPONIN QUANT: CPT

## 2020-01-02 PROCEDURE — 99284 EMERGENCY DEPT VISIT MOD MDM: CPT

## 2020-01-02 PROCEDURE — 93005 ELECTROCARDIOGRAM TRACING: CPT | Performed by: EMERGENCY MEDICINE

## 2020-01-02 RX ORDER — METOPROLOL TARTRATE 1 MG/ML
5 INJECTION, SOLUTION INTRAVENOUS ONCE
Status: DISCONTINUED | OUTPATIENT
Start: 2020-01-02 | End: 2020-01-02 | Stop reason: HOSPADM

## 2020-01-02 NOTE — ED TRIAGE NOTES
Chest pain, headache, high blood pressure. Has a prescription for blood pressure med but didn't get it filled.

## 2020-01-02 NOTE — ED PROVIDER NOTES
ED Provider Note    CHIEF COMPLAINT  Chief Complaint   Patient presents with   • Chest Pain     chest pressure and high blood pressure       HPI  Melina Mcmahan is a 29 y.o. female who presents to the Emergency Department with a past medical history significant for hypertension, was first diagnosed when she was pregnant, she has had an extensive work-up to rule out other causes such as renal artery stenosis or catecholamine induced tumor creating hypertension.  The patient has a prescription for antihypertensives which he is not taking.  Yesterday she had a headache took her blood pressure it was elevated with a diastolic of 110 and she went to get her prescription filled.  Today she continued to have headache with associated chest pressure and came to the emergency department.  She does have an appointment to see her physician tomorrow.  She denies any focal weakness difficulty ambulating slurred speech head trauma        REVIEW OF SYSTEMS    As above all other systems are negative.    PAST MEDICAL HISTORY   has a past medical history of Hypertension (6606-3593). She also has no past medical history of Addisons disease (HCC), Adrenal disorder (HCC), Allergy, Anemia, Anxiety, Arrhythmia, Arthritis, ASTHMA, Blood transfusion, Cancer (HCC), CATARACT, CHF (congestive heart failure) (HCC), Clotting disorder (HCC), COPD, Cushings syndrome (HCC), Depression, Diabetes, Diabetic neuropathy (HCC), EMPHYSEMA, GERD (gastroesophageal reflux disease), Glaucoma, Goiter, Headache(784.0), Heart attack (HCC), Heart murmur, HIV (human immunodeficiency virus infection), Hyperlipidemia, IBD (inflammatory bowel disease), Kidney disease, Meningitis, Migraine, Muscle disorder, OSTEOPOROSIS, Parathyroid disorder (HCC), Pituitary disease (HCC), Seizure (HCC), Sickle cell disease (HCC), Stroke (HCC), Substance abuse (HCC), Thyroid disease, Tuberculosis, Ulcer, or Urinary tract infection, site not specified.    FAMILY HISTORY  Family  "History   Problem Relation Age of Onset   • Cancer Mother         unknown   • Diabetes Father         diet and pills   • Hypertension Father    • Diabetes Sister         eldest sister, controlled with diet and pills   • Other Paternal Grandmother         kidney transplant        SOCIAL HISTORY  Social History     Tobacco Use   • Smoking status: Current Some Day Smoker     Packs/day: 0.25     Years: 3.00     Pack years: 0.75     Types: Cigarettes     Last attempt to quit: 3/19/2013     Years since quittin.7   • Smokeless tobacco: Never Used   • Tobacco comment: 1 cig dialy    Substance and Sexual Activity   • Alcohol use: No   • Drug use: No   • Sexual activity: Yes     Partners: Male     Birth control/protection: Condom, Pill     Comment: FOB involved and supportive.       SURGICAL HISTORY   has a past surgical history that includes other abdominal surgery; dilation and curettage (); primary c section (); and repeat c section w tubal ligation (2014).    CURRENT MEDICATIONS  Reviewed.  See Encounter Summary.  Include     Current Outpatient Medications:   •  lisinopril (PRINIVIL) 5 MG Tab, Take 1 Tab by mouth every day., Disp: 30 Tab, Rfl: 5  •  prenatal vit/fe fumarate/fa (PRENATAL S) 27-0.8 MG TABS, Take 1 Tab by mouth every morning., Disp: , Rfl:         ALLERGIES  Allergies   Allergen Reactions   • Nkda [No Known Drug Allergy]        PHYSICAL EXAM  VITAL SIGNS: /89   Pulse 85   Temp 37.4 °C (99.3 °F) (Temporal)   Resp 18   Ht 1.626 m (5' 4\")   Wt 84.3 kg (185 lb 13.6 oz)   LMP 2019   SpO2 97%   Breastfeeding? No   BMI 31.90 kg/m²   Constitutional:  Alert , able to answer questions  HENT: Nose is normal in appearance, external ears are normal,  moist mucous membranes  Eyes: Anicteric,  pupils are equal round and reactive, there is no conjunctival drainage or pallor   Neck: The trachea is midline, there is no obvious mass or meningeal signs  Cardiovascular: Good perfusion,  " regular rate and rhythm without murmurs gallops or rubs  Thorax & Lungs: Respiratory rate and effort are normal. There is normal chest excursion with respiration.  No wheezes rhonchi or rales noted.  Abdomen: Abdomen is normal in appearance, no gross peritoneal signs  normal bowel sounds, no pain with cough  :   No CVA tenderness to palpation  Musculoskeletal: No deformities noted in all 4 extremities.   Skin: Visualized skin is warm without rash.  Neurologic:  Cranial nerves II through XII are intact there is no focal abnormality noted.  Normal finger-to-nose testing no pronator drift  Psychiatric: Normal mood and mentation    RADIOLOGY/PROCEDURES  Imaging Studies:    DX-CHEST-PORTABLE (1 VIEW)   Final Result         1. No acute cardiopulmonary abnormalities are identified.            Pertinent Labs   Results for orders placed or performed during the hospital encounter of 01/02/20   CBC with Differential   Result Value Ref Range    WBC 8.7 4.8 - 10.8 K/uL    RBC 5.10 4.20 - 5.40 M/uL    Hemoglobin 13.3 12.0 - 16.0 g/dL    Hematocrit 41.2 37.0 - 47.0 %    MCV 80.8 (L) 81.4 - 97.8 fL    MCH 26.1 (L) 27.0 - 33.0 pg    MCHC 32.3 (L) 33.6 - 35.0 g/dL    RDW 43.6 35.9 - 50.0 fL    Platelet Count 284 164 - 446 K/uL    MPV 11.3 9.0 - 12.9 fL    Neutrophils-Polys 64.60 44.00 - 72.00 %    Lymphocytes 28.70 22.00 - 41.00 %    Monocytes 5.20 0.00 - 13.40 %    Eosinophils 0.60 0.00 - 6.90 %    Basophils 0.60 0.00 - 1.80 %    Immature Granulocytes 0.30 0.00 - 0.90 %    Nucleated RBC 0.00 /100 WBC    Neutrophils (Absolute) 5.62 2.00 - 7.15 K/uL    Lymphs (Absolute) 2.49 1.00 - 4.80 K/uL    Monos (Absolute) 0.45 0.00 - 0.85 K/uL    Eos (Absolute) 0.05 0.00 - 0.51 K/uL    Baso (Absolute) 0.05 0.00 - 0.12 K/uL    Immature Granulocytes (abs) 0.03 0.00 - 0.11 K/uL    NRBC (Absolute) 0.00 K/uL   Complete Metabolic Panel (CMP)   Result Value Ref Range    Sodium 139 135 - 145 mmol/L    Potassium 3.8 3.6 - 5.5 mmol/L    Chloride 103 96  - 112 mmol/L    Co2 21 20 - 33 mmol/L    Anion Gap 15.0 (H) 0.0 - 11.9    Glucose 126 (H) 65 - 99 mg/dL    Bun 11 8 - 22 mg/dL    Creatinine 0.60 0.50 - 1.40 mg/dL    Calcium 9.4 8.4 - 10.2 mg/dL    AST(SGOT) 16 12 - 45 U/L    ALT(SGPT) 9 2 - 50 U/L    Alkaline Phosphatase 59 30 - 99 U/L    Total Bilirubin 0.3 0.1 - 1.5 mg/dL    Albumin 4.4 3.2 - 4.9 g/dL    Total Protein 7.8 6.0 - 8.2 g/dL    Globulin 3.4 1.9 - 3.5 g/dL    A-G Ratio 1.3 g/dL   Troponin   Result Value Ref Range    Troponin T <6 6 - 19 ng/L   ESTIMATED GFR   Result Value Ref Range    GFR If African American >60 >60 mL/min/1.73 m 2    GFR If Non African American >60 >60 mL/min/1.73 m 2   EKG   Result Value Ref Range    Report       [facility] Emergency Dept.    Test Date:  2020  Pt Name:    MADDY FERREIRA                Department: [facility]  MRN:        [id number]                      Room:  Gender:     Female                       Technician: YASMINE  :        1990                   Requested By:ER TRIAGE PROTOCOL  Order #:    [id number]                    Reading MD:    Measurements  Intervals                                Axis  Rate:       108                          P:          51  UT:         131                          QRS:        -4  QRSD:       104                          T:          153  QT:         303  QTc:        406    Interpretive Statements  Sinus tachycardia  Probable LVH with secondary repol abnrm  Compared to ECG 2019 20:40:24  Sinus rhythm no longer present  ST (T wave) deviation no longer present  T-wave abnormality no longer present  Q waves no longer present           EKG:   I interpreted this EKG myself.  This is a 12-lead study.  The rhythm is sinus tachycardia with a rate of 108.  There are no ST segment nor T wave abnormalities.  Interpretation: No ST segment elevation myocardial infarction.  LVH.      COURSE & MEDICAL DECISION MAKING  Nursing notes and vital signs were reviewed. (See  chart for details)  The patients prior records were reviewed, history was obtained from the patient;     The patient presents with hypertension chest pain and headache, and the differential diagnosis includes but is not limited to untreated hypertension, no clinical evidence of stroke on physical exam, the patient does not have thunderclap or worst or severe headache to suggest subarachnoid hemorrhage, I cannot exclude acute coronary syndrome.       Initial orders in the Emergency Department included CBC CMP troponin  and initial treatment in the Emergency Department    ED testing reveals negative troponin negative normal renal function normal electrolytes no ST elevation MI on EKG with greater than 6 hours of symptoms.  I do not believe she is having an acute heart attack or acute coronary syndrome.  Her blood pressure has spontaneously reduced to 147/89 with a pulse of 85.  She has a prescription that is being filled of her own blood pressure medicine which which she will start taking today and she will be seen by her primary care doctor tomorrow      FINAL IMPRESSION  1.  Chest pain, likely secondary to untreated hypertension  2.  Hypertension       DISPOSITION  Home in stable condition      FOLLOW UP:  Jane Smith M.D.  [address] [place] [id number] [phone]             The patient verbally agreed to the discharge precautions and follow-up plan which is documented in EPIC.    Electronically signed by: Crystal Louis, 1/2/2020 1:07 PM

## 2020-01-03 ENCOUNTER — HOSPITAL ENCOUNTER (OUTPATIENT)
Dept: LAB | Facility: MEDICAL CENTER | Age: 30
End: 2020-01-03
Attending: STUDENT IN AN ORGANIZED HEALTH CARE EDUCATION/TRAINING PROGRAM
Payer: COMMERCIAL

## 2020-01-03 LAB
APPEARANCE UR: CLEAR
BILIRUB UR QL STRIP.AUTO: NEGATIVE
COLOR UR: YELLOW
CREAT UR-MCNC: 98.2 MG/DL
GLUCOSE UR STRIP.AUTO-MCNC: NEGATIVE MG/DL
KETONES UR STRIP.AUTO-MCNC: NEGATIVE MG/DL
LEUKOCYTE ESTERASE UR QL STRIP.AUTO: NEGATIVE
MICRO URNS: NORMAL
MICROALBUMIN UR-MCNC: 2.3 MG/DL
MICROALBUMIN/CREAT UR: 23 MG/G (ref 0–30)
NITRITE UR QL STRIP.AUTO: NEGATIVE
PH UR STRIP.AUTO: 5.5 [PH] (ref 5–8)
PROT UR QL STRIP: NEGATIVE MG/DL
RBC UR QL AUTO: NEGATIVE
SP GR UR STRIP.AUTO: 1.02
UROBILINOGEN UR STRIP.AUTO-MCNC: 0.2 MG/DL

## 2020-01-03 PROCEDURE — 82043 UR ALBUMIN QUANTITATIVE: CPT

## 2020-01-03 PROCEDURE — 81003 URINALYSIS AUTO W/O SCOPE: CPT

## 2020-01-03 PROCEDURE — 82570 ASSAY OF URINE CREATININE: CPT

## 2020-11-01 ENCOUNTER — APPOINTMENT (OUTPATIENT)
Dept: RADIOLOGY | Facility: MEDICAL CENTER | Age: 30
End: 2020-11-01
Attending: STUDENT IN AN ORGANIZED HEALTH CARE EDUCATION/TRAINING PROGRAM
Payer: COMMERCIAL

## 2020-11-01 ENCOUNTER — APPOINTMENT (OUTPATIENT)
Dept: RADIOLOGY | Facility: MEDICAL CENTER | Age: 30
End: 2020-11-01
Attending: EMERGENCY MEDICINE
Payer: COMMERCIAL

## 2020-11-01 ENCOUNTER — HOSPITAL ENCOUNTER (OUTPATIENT)
Facility: MEDICAL CENTER | Age: 30
End: 2020-11-03
Attending: EMERGENCY MEDICINE | Admitting: INTERNAL MEDICINE
Payer: COMMERCIAL

## 2020-11-01 DIAGNOSIS — E05.91 THYROTOXICOSIS WITH THYROTOXIC CRISIS, UNSPECIFIED THYROTOXICOSIS TYPE: ICD-10-CM

## 2020-11-01 PROBLEM — E05.90 THYROTOXICOSIS: Status: ACTIVE | Noted: 2020-11-01

## 2020-11-01 PROBLEM — I10 HYPERTENSION: Status: ACTIVE | Noted: 2020-11-01

## 2020-11-01 LAB
ALBUMIN SERPL BCP-MCNC: 4.1 G/DL (ref 3.2–4.9)
ALBUMIN/GLOB SERPL: 1.5 G/DL
ALP SERPL-CCNC: 75 U/L (ref 30–99)
ALT SERPL-CCNC: 14 U/L (ref 2–50)
AMPHET UR QL SCN: NEGATIVE
ANION GAP SERPL CALC-SCNC: 13 MMOL/L (ref 7–16)
AST SERPL-CCNC: 20 U/L (ref 12–45)
BARBITURATES UR QL SCN: NEGATIVE
BASOPHILS # BLD AUTO: 0.3 % (ref 0–1.8)
BASOPHILS # BLD: 0.02 K/UL (ref 0–0.12)
BENZODIAZ UR QL SCN: NEGATIVE
BILIRUB SERPL-MCNC: 0.4 MG/DL (ref 0.1–1.5)
BUN SERPL-MCNC: 12 MG/DL (ref 8–22)
BZE UR QL SCN: NEGATIVE
CALCIUM SERPL-MCNC: 9.8 MG/DL (ref 8.5–10.5)
CANNABINOIDS UR QL SCN: NEGATIVE
CHLORIDE SERPL-SCNC: 105 MMOL/L (ref 96–112)
CO2 SERPL-SCNC: 21 MMOL/L (ref 20–33)
COVID ORDER STATUS COVID19: NORMAL
CREAT SERPL-MCNC: 0.46 MG/DL (ref 0.5–1.4)
D DIMER PPP IA.FEU-MCNC: <0.27 UG/ML (FEU) (ref 0–0.5)
EKG IMPRESSION: NORMAL
EOSINOPHIL # BLD AUTO: 0.17 K/UL (ref 0–0.51)
EOSINOPHIL NFR BLD: 2.8 % (ref 0–6.9)
ERYTHROCYTE [DISTWIDTH] IN BLOOD BY AUTOMATED COUNT: 39.9 FL (ref 35.9–50)
ETHANOL BLD-MCNC: <10.1 MG/DL (ref 0–10)
GLOBULIN SER CALC-MCNC: 2.7 G/DL (ref 1.9–3.5)
GLUCOSE SERPL-MCNC: 112 MG/DL (ref 65–99)
HCG SERPL QL: NEGATIVE
HCT VFR BLD AUTO: 39 % (ref 37–47)
HGB BLD-MCNC: 12.9 G/DL (ref 12–16)
IMM GRANULOCYTES # BLD AUTO: 0.03 K/UL (ref 0–0.11)
IMM GRANULOCYTES NFR BLD AUTO: 0.5 % (ref 0–0.9)
LYMPHOCYTES # BLD AUTO: 2.78 K/UL (ref 1–4.8)
LYMPHOCYTES NFR BLD: 45.8 % (ref 22–41)
MAGNESIUM SERPL-MCNC: 1.4 MG/DL (ref 1.5–2.5)
MCH RBC QN AUTO: 27.2 PG (ref 27–33)
MCHC RBC AUTO-ENTMCNC: 33.1 G/DL (ref 33.6–35)
MCV RBC AUTO: 82.3 FL (ref 81.4–97.8)
METHADONE UR QL SCN: NEGATIVE
MONOCYTES # BLD AUTO: 0.7 K/UL (ref 0–0.85)
MONOCYTES NFR BLD AUTO: 11.5 % (ref 0–13.4)
NEUTROPHILS # BLD AUTO: 2.37 K/UL (ref 2–7.15)
NEUTROPHILS NFR BLD: 39.1 % (ref 44–72)
NRBC # BLD AUTO: 0 K/UL
NRBC BLD-RTO: 0 /100 WBC
OPIATES UR QL SCN: NEGATIVE
OXYCODONE UR QL SCN: NEGATIVE
PCP UR QL SCN: NEGATIVE
PLATELET # BLD AUTO: 260 K/UL (ref 164–446)
PMV BLD AUTO: 10.8 FL (ref 9–12.9)
POTASSIUM SERPL-SCNC: 4.2 MMOL/L (ref 3.6–5.5)
PROPOXYPH UR QL SCN: NEGATIVE
PROT SERPL-MCNC: 6.8 G/DL (ref 6–8.2)
RBC # BLD AUTO: 4.74 M/UL (ref 4.2–5.4)
SARS-COV-2 RDRP RESP QL NAA+PROBE: NOTDETECTED
SODIUM SERPL-SCNC: 139 MMOL/L (ref 135–145)
SPECIMEN SOURCE: NORMAL
T3FREE SERPL-MCNC: 19 PG/ML (ref 2–4.4)
T4 FREE SERPL-MCNC: 3.81 NG/DL (ref 0.93–1.7)
TROPONIN T SERPL-MCNC: 11 NG/L (ref 6–19)
TROPONIN T SERPL-MCNC: <6 NG/L (ref 6–19)
TSH SERPL DL<=0.005 MIU/L-ACNC: <0.005 UIU/ML (ref 0.38–5.33)
WBC # BLD AUTO: 6.1 K/UL (ref 4.8–10.8)

## 2020-11-01 PROCEDURE — 700102 HCHG RX REV CODE 250 W/ 637 OVERRIDE(OP): Performed by: STUDENT IN AN ORGANIZED HEALTH CARE EDUCATION/TRAINING PROGRAM

## 2020-11-01 PROCEDURE — 99220 PR INITIAL OBSERVATION CARE,LEVL III: CPT | Mod: GC | Performed by: INTERNAL MEDICINE

## 2020-11-01 PROCEDURE — 76536 US EXAM OF HEAD AND NECK: CPT

## 2020-11-01 PROCEDURE — U0004 COV-19 TEST NON-CDC HGH THRU: HCPCS

## 2020-11-01 PROCEDURE — 93005 ELECTROCARDIOGRAM TRACING: CPT

## 2020-11-01 PROCEDURE — A9270 NON-COVERED ITEM OR SERVICE: HCPCS | Performed by: STUDENT IN AN ORGANIZED HEALTH CARE EDUCATION/TRAINING PROGRAM

## 2020-11-01 PROCEDURE — G0378 HOSPITAL OBSERVATION PER HR: HCPCS

## 2020-11-01 PROCEDURE — 96375 TX/PRO/DX INJ NEW DRUG ADDON: CPT

## 2020-11-01 PROCEDURE — 93005 ELECTROCARDIOGRAM TRACING: CPT | Performed by: EMERGENCY MEDICINE

## 2020-11-01 PROCEDURE — C9803 HOPD COVID-19 SPEC COLLECT: HCPCS | Performed by: STUDENT IN AN ORGANIZED HEALTH CARE EDUCATION/TRAINING PROGRAM

## 2020-11-01 PROCEDURE — 85379 FIBRIN DEGRADATION QUANT: CPT

## 2020-11-01 PROCEDURE — 71045 X-RAY EXAM CHEST 1 VIEW: CPT

## 2020-11-01 PROCEDURE — 84481 FREE ASSAY (FT-3): CPT

## 2020-11-01 PROCEDURE — 700102 HCHG RX REV CODE 250 W/ 637 OVERRIDE(OP): Performed by: EMERGENCY MEDICINE

## 2020-11-01 PROCEDURE — 36415 COLL VENOUS BLD VENIPUNCTURE: CPT

## 2020-11-01 PROCEDURE — 80053 COMPREHEN METABOLIC PANEL: CPT

## 2020-11-01 PROCEDURE — 96374 THER/PROPH/DIAG INJ IV PUSH: CPT

## 2020-11-01 PROCEDURE — A9270 NON-COVERED ITEM OR SERVICE: HCPCS | Performed by: EMERGENCY MEDICINE

## 2020-11-01 PROCEDURE — 83735 ASSAY OF MAGNESIUM: CPT

## 2020-11-01 PROCEDURE — 80307 DRUG TEST PRSMV CHEM ANLYZR: CPT

## 2020-11-01 PROCEDURE — 700111 HCHG RX REV CODE 636 W/ 250 OVERRIDE (IP): Performed by: EMERGENCY MEDICINE

## 2020-11-01 PROCEDURE — 84703 CHORIONIC GONADOTROPIN ASSAY: CPT

## 2020-11-01 PROCEDURE — 84439 ASSAY OF FREE THYROXINE: CPT

## 2020-11-01 PROCEDURE — 700105 HCHG RX REV CODE 258: Performed by: EMERGENCY MEDICINE

## 2020-11-01 PROCEDURE — 85025 COMPLETE CBC W/AUTO DIFF WBC: CPT

## 2020-11-01 PROCEDURE — 84484 ASSAY OF TROPONIN QUANT: CPT | Mod: 91

## 2020-11-01 PROCEDURE — 84443 ASSAY THYROID STIM HORMONE: CPT

## 2020-11-01 PROCEDURE — 700101 HCHG RX REV CODE 250: Performed by: EMERGENCY MEDICINE

## 2020-11-01 PROCEDURE — 99285 EMERGENCY DEPT VISIT HI MDM: CPT

## 2020-11-01 RX ORDER — POLYETHYLENE GLYCOL 3350 17 G/17G
1 POWDER, FOR SOLUTION ORAL
Status: DISCONTINUED | OUTPATIENT
Start: 2020-11-01 | End: 2020-11-03 | Stop reason: HOSPADM

## 2020-11-01 RX ORDER — PROPRANOLOL HYDROCHLORIDE 1 MG/ML
1 INJECTION, SOLUTION INTRAVENOUS ONCE
Status: COMPLETED | OUTPATIENT
Start: 2020-11-01 | End: 2020-11-01

## 2020-11-01 RX ORDER — METHIMAZOLE 10 MG/1
10 TABLET ORAL 3 TIMES DAILY
Status: DISCONTINUED | OUTPATIENT
Start: 2020-11-01 | End: 2020-11-03 | Stop reason: HOSPADM

## 2020-11-01 RX ORDER — LABETALOL HYDROCHLORIDE 5 MG/ML
10 INJECTION, SOLUTION INTRAVENOUS EVERY 4 HOURS PRN
Status: DISCONTINUED | OUTPATIENT
Start: 2020-11-01 | End: 2020-11-03 | Stop reason: HOSPADM

## 2020-11-01 RX ORDER — AMOXICILLIN 250 MG
2 CAPSULE ORAL 2 TIMES DAILY
Status: DISCONTINUED | OUTPATIENT
Start: 2020-11-01 | End: 2020-11-03 | Stop reason: HOSPADM

## 2020-11-01 RX ORDER — LOSARTAN POTASSIUM 25 MG/1
25 TABLET ORAL EVERY MORNING
Status: DISCONTINUED | OUTPATIENT
Start: 2020-11-01 | End: 2020-11-03 | Stop reason: HOSPADM

## 2020-11-01 RX ORDER — LORAZEPAM 2 MG/ML
1 INJECTION INTRAMUSCULAR ONCE
Status: COMPLETED | OUTPATIENT
Start: 2020-11-01 | End: 2020-11-01

## 2020-11-01 RX ORDER — SODIUM CHLORIDE 9 MG/ML
1000 INJECTION, SOLUTION INTRAVENOUS ONCE
Status: COMPLETED | OUTPATIENT
Start: 2020-11-01 | End: 2020-11-01

## 2020-11-01 RX ORDER — METHIMAZOLE 10 MG/1
10 TABLET ORAL ONCE
Status: COMPLETED | OUTPATIENT
Start: 2020-11-01 | End: 2020-11-01

## 2020-11-01 RX ORDER — METOPROLOL TARTRATE 1 MG/ML
5 INJECTION, SOLUTION INTRAVENOUS
Status: DISCONTINUED | OUTPATIENT
Start: 2020-11-01 | End: 2020-11-02

## 2020-11-01 RX ORDER — METOPROLOL SUCCINATE 25 MG/1
25 TABLET, EXTENDED RELEASE ORAL EVERY EVENING
Status: ON HOLD | COMMUNITY
End: 2020-11-03

## 2020-11-01 RX ORDER — BISACODYL 10 MG
10 SUPPOSITORY, RECTAL RECTAL
Status: DISCONTINUED | OUTPATIENT
Start: 2020-11-01 | End: 2020-11-03 | Stop reason: HOSPADM

## 2020-11-01 RX ORDER — LOSARTAN POTASSIUM 25 MG/1
50 TABLET ORAL EVERY MORNING
COMMUNITY
End: 2021-09-08 | Stop reason: SDUPTHER

## 2020-11-01 RX ORDER — METOPROLOL SUCCINATE 25 MG/1
25 TABLET, EXTENDED RELEASE ORAL EVERY EVENING
Status: DISCONTINUED | OUTPATIENT
Start: 2020-11-01 | End: 2020-11-02

## 2020-11-01 RX ORDER — ERGOCALCIFEROL 1.25 MG/1
50000 CAPSULE ORAL
COMMUNITY
End: 2022-08-04

## 2020-11-01 RX ORDER — METOPROLOL TARTRATE 1 MG/ML
5 INJECTION, SOLUTION INTRAVENOUS ONCE
Status: COMPLETED | OUTPATIENT
Start: 2020-11-01 | End: 2020-11-01

## 2020-11-01 RX ADMIN — METOPROLOL TARTRATE 5 MG: 5 INJECTION, SOLUTION INTRAVENOUS at 09:40

## 2020-11-01 RX ADMIN — METHIMAZOLE 10 MG: 10 TABLET ORAL at 10:24

## 2020-11-01 RX ADMIN — LOSARTAN POTASSIUM 25 MG: 25 TABLET, FILM COATED ORAL at 16:12

## 2020-11-01 RX ADMIN — METOPROLOL SUCCINATE 25 MG: 25 TABLET, EXTENDED RELEASE ORAL at 18:40

## 2020-11-01 RX ADMIN — LORAZEPAM 1 MG: 2 INJECTION INTRAMUSCULAR; INTRAVENOUS at 05:08

## 2020-11-01 RX ADMIN — SODIUM CHLORIDE 1000 ML: 9 INJECTION, SOLUTION INTRAVENOUS at 09:44

## 2020-11-01 RX ADMIN — METHIMAZOLE 10 MG: 10 TABLET ORAL at 20:15

## 2020-11-01 RX ADMIN — PROPRANOLOL HYDROCHLORIDE 1 MG: 1 INJECTION, SOLUTION INTRAVENOUS at 10:24

## 2020-11-01 SDOH — ECONOMIC STABILITY: TRANSPORTATION INSECURITY
IN THE PAST 12 MONTHS, HAS THE LACK OF TRANSPORTATION KEPT YOU FROM MEDICAL APPOINTMENTS OR FROM GETTING MEDICATIONS?: PATIENT DECLINED

## 2020-11-01 SDOH — ECONOMIC STABILITY: TRANSPORTATION INSECURITY
IN THE PAST 12 MONTHS, HAS LACK OF TRANSPORTATION KEPT YOU FROM MEETINGS, WORK, OR FROM GETTING THINGS NEEDED FOR DAILY LIVING?: PATIENT DECLINED

## 2020-11-01 SDOH — ECONOMIC STABILITY: FOOD INSECURITY: WITHIN THE PAST 12 MONTHS, THE FOOD YOU BOUGHT JUST DIDN'T LAST AND YOU DIDN'T HAVE MONEY TO GET MORE.: PATIENT DECLINED

## 2020-11-01 SDOH — ECONOMIC STABILITY: FOOD INSECURITY: WITHIN THE PAST 12 MONTHS, YOU WORRIED THAT YOUR FOOD WOULD RUN OUT BEFORE YOU GOT MONEY TO BUY MORE.: PATIENT DECLINED

## 2020-11-01 ASSESSMENT — ENCOUNTER SYMPTOMS
BLURRED VISION: 0
PND: 0
FEVER: 0
BRUISES/BLEEDS EASILY: 0
SPUTUM PRODUCTION: 0
POLYDIPSIA: 0
SINUS PAIN: 0
ORTHOPNEA: 0
BACK PAIN: 0
FLANK PAIN: 0
FOCAL WEAKNESS: 0
BLOOD IN STOOL: 0
EYE DISCHARGE: 0
DEPRESSION: 0
SPEECH CHANGE: 0
COUGH: 0
WHEEZING: 0
HALLUCINATIONS: 0
DOUBLE VISION: 0
WEIGHT LOSS: 1
HEMOPTYSIS: 0
DIZZINESS: 0
FALLS: 0
WEAKNESS: 0
NECK PAIN: 0
LOSS OF CONSCIOUSNESS: 0
SHORTNESS OF BREATH: 1
SORE THROAT: 0
INSOMNIA: 0
SEIZURES: 0
DIAPHORESIS: 0
CHILLS: 0
MEMORY LOSS: 0
MYALGIAS: 0
CLAUDICATION: 0
STRIDOR: 0
VOMITING: 0
PHOTOPHOBIA: 0
DIARRHEA: 0
HEADACHES: 0
NAUSEA: 0
PALPITATIONS: 0
HEARTBURN: 0
TINGLING: 0
CONSTIPATION: 0
NERVOUS/ANXIOUS: 0
ABDOMINAL PAIN: 0
SENSORY CHANGE: 0
EYE PAIN: 0
EYE REDNESS: 0

## 2020-11-01 ASSESSMENT — PATIENT HEALTH QUESTIONNAIRE - PHQ9
1. LITTLE INTEREST OR PLEASURE IN DOING THINGS: MORE THAN HALF THE DAYS
8. MOVING OR SPEAKING SO SLOWLY THAT OTHER PEOPLE COULD HAVE NOTICED. OR THE OPPOSITE, BEING SO FIGETY OR RESTLESS THAT YOU HAVE BEEN MOVING AROUND A LOT MORE THAN USUAL: NOT AT ALL
4. FEELING TIRED OR HAVING LITTLE ENERGY: MORE THAN HALF THE DAYS
9. THOUGHTS THAT YOU WOULD BE BETTER OFF DEAD, OR OF HURTING YOURSELF: NOT AT ALL
5. POOR APPETITE OR OVEREATING: MORE THAN HALF THE DAYS
SUM OF ALL RESPONSES TO PHQ9 QUESTIONS 1 AND 2: 4
SUM OF ALL RESPONSES TO PHQ QUESTIONS 1-9: 10
3. TROUBLE FALLING OR STAYING ASLEEP OR SLEEPING TOO MUCH: MORE THAN HALF THE DAYS
6. FEELING BAD ABOUT YOURSELF - OR THAT YOU ARE A FAILURE OR HAVE LET YOURSELF OR YOUR FAMILY DOWN: NOT AL ALL
7. TROUBLE CONCENTRATING ON THINGS, SUCH AS READING THE NEWSPAPER OR WATCHING TELEVISION: NOT AT ALL
2. FEELING DOWN, DEPRESSED, IRRITABLE, OR HOPELESS: MORE THAN HALF THE DAYS

## 2020-11-01 ASSESSMENT — LIFESTYLE VARIABLES
ALCOHOL_USE: NO
AVERAGE NUMBER OF DAYS PER WEEK YOU HAVE A DRINK CONTAINING ALCOHOL: 0
EVER HAD A DRINK FIRST THING IN THE MORNING TO STEADY YOUR NERVES TO GET RID OF A HANGOVER: NO
HAVE YOU EVER FELT YOU SHOULD CUT DOWN ON YOUR DRINKING: NO
CONSUMPTION TOTAL: NEGATIVE
TOTAL SCORE: 0
TOTAL SCORE: 0
HOW MANY TIMES IN THE PAST YEAR HAVE YOU HAD 5 OR MORE DRINKS IN A DAY: 0
TOTAL SCORE: 0
HAVE PEOPLE ANNOYED YOU BY CRITICIZING YOUR DRINKING: NO
EVER FELT BAD OR GUILTY ABOUT YOUR DRINKING: NO
SUBSTANCE_ABUSE: 0
ON A TYPICAL DAY WHEN YOU DRINK ALCOHOL HOW MANY DRINKS DO YOU HAVE: 0

## 2020-11-01 ASSESSMENT — COGNITIVE AND FUNCTIONAL STATUS - GENERAL
SUGGESTED CMS G CODE MODIFIER MOBILITY: CH
MOBILITY SCORE: 24
SUGGESTED CMS G CODE MODIFIER DAILY ACTIVITY: CH
DAILY ACTIVITIY SCORE: 24

## 2020-11-01 ASSESSMENT — PAIN DESCRIPTION - PAIN TYPE
TYPE: ACUTE PAIN
TYPE: ACUTE PAIN

## 2020-11-01 ASSESSMENT — FIBROSIS 4 INDEX
FIB4 SCORE: 0.56
FIB4 SCORE: .616756712105594734

## 2020-11-01 NOTE — ED TRIAGE NOTES
"Chief Complaint   Patient presents with   • Chest Pressure     substernal pressure, sharp L shoulder pain. +SOB, feels palpitations        Pt amb to triage with steady gait for above complaint. reports palpitations over past few nights with CP beginning last night. EKG complete. Charge RN notified.     Pt is alert and oriented, speaking in full sentences, follows commands and responds appropriately to questions. Resp are even and unlabored. No behavioral indicators of pain.   Pt placed in lobby. Pt educated on triage process. Pt encouraged to alert staff for any changes.    BP (!) 182/97   Pulse (!) 128   Temp 36.2 °C (97.1 °F) (Temporal)   Resp 16   Ht 1.626 m (5' 4\")   Wt 77.6 kg (171 lb)   SpO2 99%   BMI 29.35 kg/m²     "

## 2020-11-01 NOTE — H&P
History & Physical Note    Date of Admission: 11/1/2020  Admission Status: Observation-Outpatient  [facility] Team: [facility] IM Green Team  Attending: Krish Mittal M.D.   Senior Resident: Dr. Ford  Intern: Dr. Fritz  Contact Number: [phone]    Chief Complaint: Chest pain and palpitations      History of Present Illness (HPI):   Melina is a 30 y.o. female who presented 11/1/2020 with chest pain that radiated to her left shoulder. States that it started last night at about 6pm, and persisted till this morning, which prompted her to come to the emergency department. States that she had similar symptoms a few days ago, but they resolved on their own. Patient also reports a 2-month history of heart palpitations, and elevated heart rate. States that she's recently been feeling short of breath as well. She has had a 12-lb unintentional weight loss over the last two months as well. Patient also reports feeling shaky during this time, along with dry skin, and some hair loss as we.. Patient is followed by her PCP, who recently did lab work on her. PCP noticed abnormal thyroid labs, and sent it to a specialist for review last week, however the patient has not heard back from them.     ED course:   CXR: negative; EKG: sinus tachycardia  TSH: 0.005, Free T4: 3.81  Patient was given a dose of Propranolol and Methimazole.       Review of Systems:Review of Systems   Constitutional: Positive for weight loss. Negative for chills, diaphoresis, fever and malaise/fatigue.   HENT: Negative for congestion, ear discharge, ear pain, hearing loss, nosebleeds, sinus pain, sore throat and tinnitus.    Eyes: Negative for blurred vision, double vision, photophobia, pain, discharge and redness.   Respiratory: Positive for shortness of breath. Negative for cough, hemoptysis, sputum production, wheezing and stridor.    Cardiovascular: Negative for chest pain, palpitations, orthopnea, claudication, leg swelling and PND.   Gastrointestinal:  Negative for abdominal pain, blood in stool, constipation, diarrhea, heartburn, melena, nausea and vomiting.   Genitourinary: Negative for dysuria, flank pain, frequency, hematuria and urgency.   Musculoskeletal: Negative for back pain, falls, joint pain, myalgias and neck pain.   Skin: Positive for itching. Negative for rash.   Neurological: Negative for dizziness, tingling, sensory change, speech change, focal weakness, seizures, loss of consciousness, weakness and headaches.   Endo/Heme/Allergies: Negative for environmental allergies and polydipsia. Does not bruise/bleed easily.   Psychiatric/Behavioral: Negative for depression, hallucinations, memory loss, substance abuse and suicidal ideas. The patient is not nervous/anxious and does not have insomnia.        When reviewing histories, add date and indication in the history section whenever possible.  Past Medical History:   Past Medical History was reviewed with patient.   has a past medical history of Hypertension (5902-8024) and Thyrotoxicosis (11/1/2020). She also has no past medical history of Addisons disease ([facility]), Adrenal disorder ([facility]), Allergy, Anemia, Anxiety, Arrhythmia, Arthritis, ASTHMA, Blood transfusion, Cancer ([facility]), CATARACT, CHF (congestive heart failure) ([facility]), Clotting disorder ([facility]), COPD, Cushings syndrome ([facility]), Depression, Diabetes, Diabetic neuropathy ([facility]), EMPHYSEMA, GERD (gastroesophageal reflux disease), Glaucoma, Goiter, Headache(784.0), Heart attack ([facility]), Heart murmur, HIV (human immunodeficiency virus infection), Hyperlipidemia, IBD (inflammatory bowel disease), Kidney disease, Meningitis, Migraine, Muscle disorder, OSTEOPOROSIS, Parathyroid disorder ([facility]), Pituitary disease ([facility]), Seizure ([facility]), Sickle cell disease ([facility]), Stroke ([facility]), Substance abuse ([facility]), Tuberculosis, Ulcer, or Urinary tract infection, site not specified.    Past Surgical History: Past Surgical History was reviewed with patient.   has a past surgical  history that includes other abdominal surgery; dilation and curettage (2013); primary c section (2006); and repeat c section w tubal ligation (2/20/2014).    Medications: Medications have been reviewed with patient.  Prior to Admission Medications   Prescriptions Last Dose Informant Patient Reported? Taking?   lisinopril (PRINIVIL) 5 MG Tab NOT TAKING Patient No No   Sig: Take 1 Tab by mouth every day.   Patient not taking: Reported on 11/1/2020   losartan (COZAAR) 25 MG Tab 10/31/2020 at 0700 Patient Yes Yes   Sig: Take 25 mg by mouth every morning.   metoprolol SR (TOPROL XL) 25 MG TABLET SR 24 HR 10/31/2020 at 1900 Patient Yes Yes   Sig: Take 25 mg by mouth every evening.   vitamin D, Ergocalciferol, (DRISDOL) 1.25 MG (95093 UT) Cap capsule 10/28/2020 at am Patient Yes Yes   Sig: Take 50,000 Units by mouth every Wednesday.      Facility-Administered Medications: None        Allergies: Allergies have been reviewed with patient.  Allergies   Allergen Reactions   • Nkda [No Known Drug Allergy]        Family History:   family history includes Cancer in her mother; Diabetes in her father and sister; Hypertension in her father; Other in her paternal grandmother.     Social History:   Tobacco: Patient was smoking earlier this year, but quit. States that she smoked 1 pack/week   Alcohol: None   Recreational drugs (illegal and prescription):  None   Employment: works as a    Activity Level: Active    Living situation:  Lives with  and 5 children  Recent travel:  None  Primary Care Provider: reviewed Jane Smith M.D.  Other (stressors, spirituality, exposures):  None  Physical Exam:     Vitals:  Temp:  [36.2 °C (97.1 °F)] 36.2 °C (97.1 °F)  Pulse:  [100-128] 109  Resp:  [11-45] 45  BP: (122-191)/() 154/74  SpO2:  [95 %-99 %] 96 %    Physical Exam  Constitutional:       General: She is not in acute distress.     Appearance: Normal appearance. She is normal weight. She is not  ill-appearing.   HENT:      Mouth/Throat:      Mouth: Mucous membranes are moist.   Eyes:      Extraocular Movements: Extraocular movements intact.      Pupils: Pupils are equal, round, and reactive to light.   Cardiovascular:      Rate and Rhythm: Regular rhythm. Tachycardia present.      Pulses: Normal pulses.      Heart sounds: No murmur. No friction rub. No gallop.    Pulmonary:      Effort: No respiratory distress.      Breath sounds: No stridor. No wheezing, rhonchi or rales.   Chest:      Chest wall: No tenderness.   Abdominal:      General: There is no distension.      Palpations: There is no mass.      Tenderness: There is no abdominal tenderness. There is no right CVA tenderness, left CVA tenderness, guarding or rebound.      Hernia: No hernia is present.   Musculoskeletal:         General: No swelling, tenderness, deformity or signs of injury.      Right lower leg: No edema.      Left lower leg: No edema.   Skin:     General: Skin is dry.      Coloration: Skin is not jaundiced or pale.      Findings: No bruising, erythema, lesion or rash.   Neurological:      General: No focal deficit present.      Mental Status: She is alert and oriented to person, place, and time.      Cranial Nerves: No cranial nerve deficit.      Sensory: No sensory deficit.      Motor: No weakness.      Coordination: Coordination normal.      Gait: Gait normal.   Psychiatric:         Mood and Affect: Mood normal.         Behavior: Behavior normal.         Labs:   Reviewed    EKG: Per my read, sinus tachycardia      Imaging:   Reviewed     Previous Data Review: reviewed    Problem Representation:  Patient is a 30 y.o female w/ PMH of hypertension, who presents to the ED complaining of chest pain radiating to her shoulder. Patient also having a two month history of palpitations, shortness of breath, weight loss, dry skin, and hair loss. EKG and troponin WNL. CXR negative. TSH: 0.005, Free T4: 3.81. Differential diagnosis is  thyrotoxicosis. Patient received a dose of methimazole and propranolol in the ED.       * Thyrotoxicosis- (present on admission)  Assessment & Plan  Patient presents with two month history of palpitations, shortness of breath, dry skin, hair loss, weight loss, and shakiness.   On presentation, patient was tachycardic to the 120's  TSH: 0.005, Free T4: 3.81  Patient was given a dose of Methimazole in the ED and Propranolol    Plan:  - Methimazole 10mg tab Qdaily  - Resume home dose of Metoprolol   - Follow up thyroid ultrasound     Hypertension  Assessment & Plan  Followed and managed by her PCP.   Will look for secondary causes of hypertension    Plan:  - AM Cortisol level  - Renin/aldosterone ration  - Renal duplex ultrasound  - Metanephrine level   - Resume home meds:  - Losaratan 25mg Qdaily  - Metoprolol 25mg QHS

## 2020-11-01 NOTE — ED PROVIDER NOTES
ED Provider Note    CHIEF COMPLAINT  Chief Complaint   Patient presents with   • Chest Pressure     substernal pressure, sharp L shoulder pain. +SOB, feels palpitations        HPI  Melina Mcmahan is a 30 y.o. female who presents with chest pain.  Patient states that at least over the last few months she has chest pain every day almost all day long.  She has been followed by primary provider, Dr. Kilpatrick.  She has been treated for hypertension.  Over the last 2 to 3 days it seems to be worse.  She has tightness in her central chest that radiates to her left shoulder.  She feels short of breath.  She feels like she has a burning sensation when she breathes.  Her symptoms became more severe last night and she was lightheaded and shaky when she walked and therefore comes to the ER.  She feels that her heart is racing.  She has not had fevers or chills.  No cough.  No leg swelling or leg pain.  No hemoptysis.  Denies nausea or vomiting.  She has had some diarrhea and occasionally has pain in her lower abdomen.  None now.  No dysuria hematuria frequency.  She is not pregnant.    REVIEW OF SYSTEMS  As per HPI, otherwise a 10 point review of systems is negative    PAST MEDICAL HISTORY  Past Medical History:   Diagnosis Date   • Hypertension 4970-5129    put on bedrest at 4mths       SOCIAL HISTORY  Social History     Tobacco Use   • Smoking status: Former Smoker     Packs/day: 0.25     Years: 3.00     Pack years: 0.75     Types: Cigarettes     Quit date: 3/19/2013     Years since quittin.6   • Smokeless tobacco: Never Used   • Tobacco comment: 1 cig dialy    Substance Use Topics   • Alcohol use: No   • Drug use: No       SURGICAL HISTORY  Past Surgical History:   Procedure Laterality Date   • REPEAT C SECTION W TUBAL LIGATION  2014    Performed by Barbara Velasco M.D. at [facility]   • DILATION AND CURETTAGE  2013    D & C   • PRIMARY C SECTION  2006    position of baby, face first    •  "OTHER ABDOMINAL SURGERY      c section       CURRENT MEDICATIONS  Home Medications     Reviewed by Suyapa Pike R.N. (Registered Nurse) on 11/01/20 at 0420  Med List Status: <None>   Medication Last Dose Status   lisinopril (PRINIVIL) 5 MG Tab  Active   prenatal vit/fe fumarate/fa (PRENATAL S) 27-0.8 MG TABS  Active                ALLERGIES  Allergies   Allergen Reactions   • Nkda [No Known Drug Allergy]        PHYSICAL EXAM  VITAL SIGNS: BP (!) 174/87   Pulse (!) 128   Temp 36.2 °C (97.1 °F) (Temporal)   Resp 16   Ht 1.626 m (5' 4\")   Wt 77.6 kg (171 lb)   SpO2 99%   BMI 29.35 kg/m²    Constitutional: Awake and alert.  Very anxious and tearful  HENT:  Atraumatic, Normocephalic.Oropharynx dry mucus membranes, Nose normal inspection.   Eyes: Normal inspection  Neck: Supple  Cardiovascular: Tachycardic heart rate, Normal rhythm.  Symmetric peripheral pulses.   Thorax & Lungs: No respiratory distress, No wheezing, No rales, No rhonchi, No chest tenderness.   Abdomen: Bowel sounds normal, soft, non-distended, nontender, no mass  Skin: Warm, Dry, No rash.   Back: No tenderness, No CVA tenderness.   Extremities: No clubbing, cyanosis, edema, no Homans or cords   Neurologic: Grossly normal   Psychiatric: Anxious appearing    RADIOLOGY/PROCEDURES  DX-CHEST-PORTABLE (1 VIEW)   Final Result         No acute cardiac or pulmonary abnormality is identified.           Imaging is interpreted by radiologist    Labs:  Results for orders placed or performed during the hospital encounter of 11/01/20   CBC with Differential   Result Value Ref Range    WBC 6.1 4.8 - 10.8 K/uL    RBC 4.74 4.20 - 5.40 M/uL    Hemoglobin 12.9 12.0 - 16.0 g/dL    Hematocrit 39.0 37.0 - 47.0 %    MCV 82.3 81.4 - 97.8 fL    MCH 27.2 27.0 - 33.0 pg    MCHC 33.1 (L) 33.6 - 35.0 g/dL    RDW 39.9 35.9 - 50.0 fL    Platelet Count 260 164 - 446 K/uL    MPV 10.8 9.0 - 12.9 fL    Neutrophils-Polys 39.10 (L) 44.00 - 72.00 %    Lymphocytes 45.80 (H) 22.00 - " 41.00 %    Monocytes 11.50 0.00 - 13.40 %    Eosinophils 2.80 0.00 - 6.90 %    Basophils 0.30 0.00 - 1.80 %    Immature Granulocytes 0.50 0.00 - 0.90 %    Nucleated RBC 0.00 /100 WBC    Neutrophils (Absolute) 2.37 2.00 - 7.15 K/uL    Lymphs (Absolute) 2.78 1.00 - 4.80 K/uL    Monos (Absolute) 0.70 0.00 - 0.85 K/uL    Eos (Absolute) 0.17 0.00 - 0.51 K/uL    Baso (Absolute) 0.02 0.00 - 0.12 K/uL    Immature Granulocytes (abs) 0.03 0.00 - 0.11 K/uL    NRBC (Absolute) 0.00 K/uL   Complete Metabolic Panel (CMP)   Result Value Ref Range    Sodium 139 135 - 145 mmol/L    Potassium 4.2 3.6 - 5.5 mmol/L    Chloride 105 96 - 112 mmol/L    Co2 21 20 - 33 mmol/L    Anion Gap 13.0 7.0 - 16.0    Glucose 112 (H) 65 - 99 mg/dL    Bun 12 8 - 22 mg/dL    Creatinine 0.46 (L) 0.50 - 1.40 mg/dL    Calcium 9.8 8.5 - 10.5 mg/dL    AST(SGOT) 20 12 - 45 U/L    ALT(SGPT) 14 2 - 50 U/L    Alkaline Phosphatase 75 30 - 99 U/L    Total Bilirubin 0.4 0.1 - 1.5 mg/dL    Albumin 4.1 3.2 - 4.9 g/dL    Total Protein 6.8 6.0 - 8.2 g/dL    Globulin 2.7 1.9 - 3.5 g/dL    A-G Ratio 1.5 g/dL   Troponin STAT   Result Value Ref Range    Troponin T <6 6 - 19 ng/L   HCG Qual Serum   Result Value Ref Range    Beta-Hcg Qualitative Serum Negative Negative   D-Dimer   Result Value Ref Range    D-Dimer Screen <0.27 0.00 - 0.50 ug/mL (FEU)   ESTIMATED GFR   Result Value Ref Range    GFR If African American >60 >60 mL/min/1.73 m 2    GFR If Non African American >60 >60 mL/min/1.73 m 2   TROPONIN   Result Value Ref Range    Troponin T 11 6 - 19 ng/L   TSH   Result Value Ref Range    TSH <0.005 (L) 0.380 - 5.330 uIU/mL   URINE DRUG SCREEN   Result Value Ref Range    Amphetamines Urine Negative Negative    Barbiturates Negative Negative    Benzodiazepines Negative Negative    Cocaine Metabolite Negative Negative    Methadone Negative Negative    Opiates Negative Negative    Oxycodone Negative Negative    Phencyclidine -Pcp Negative Negative    Propoxyphene Negative  Negative    Cannabinoid Metab Negative Negative   FREE THYROXINE   Result Value Ref Range    Free T-4 3.81 (H) 0.93 - 1.70 ng/dL   EKG   Result Value Ref Range    Report       [facility] Emergency Dept.    Test Date:  2020  Pt Name:    MADDY FERREIRA                Department: ER  MRN:        [id number]                      Room:  Gender:     Female                       Technician: [id number]  :        1990                   Requested By:ER TRIAGE PROTOCOL  Order #:    [id number]                    Reading MD: DIVINA HANSON MD    Measurements  Intervals                                Axis  Rate:       130                          P:          198  LA:         134                          QRS:        20  QRSD:       100                          T:          136  QT:         307  QTc:        452    Interpretive Statements  Sinus or ectopic atrial tachycardia  Ventricular preexcitation(WPW)  Compared to ECG 2020 09:37:28  Sinus tachycardia no longer present  Electronically Signed On 2020 4:55:54 PST by DIVINA HANSON MD     EKG   Result Value Ref Range    Report       [facility] Emergency Dept.    Test Date:  2020  Pt Name:    MADDY FERREIRA                Department: ER  MRN:        [id number]                      Room:  Gender:     Female                       Technician: [id number]  :        1990                   Requested By:ER TRIAGE PROTOCOL  Order #:    [id number]                    Reading MD: DIVINA HANSON MD    Measurements  Intervals                                Axis  Rate:       120                          P:          36  LA:         124                          QRS:        0  QRSD:       94                           T:          79  QT:         320  QTc:        453    Interpretive Statements  SINUS TACHYCARDIA  PROBABLE LVH WITH SECONDARY REPOL ABNRM  BASELINE WANDER IN LEAD(S) II,III,aVF  Compared to ECG 2020 05:21:03  No  significant changes  Electronically Signed On 11-1-2020 9:45:23 PST by DIVINA HANSON MD         Medications   propranolol (INDERAL) injection 1 mg (has no administration in time range)   methimazole (TAPAZOLE) tablet 10 mg (has no administration in time range)   LORazepam (ATIVAN) injection 1 mg (1 mg Intravenous Given 11/1/20 2688)   NS infusion 1,000 mL (1,000 mL Intravenous New Bag 11/1/20 9277)   Metoprolol Tartrate (LOPRESSOR) injection 5 mg (5 mg Intravenous Given 11/1/20 0940)       HYDRATION: Based on the patient's presentation of Tachycardia the patient was given IV fluids. IV Hydration was used because oral hydration was not adequate alone. Upon recheck following hydration, the patient was Improved.    COURSE & MEDICAL DECISION MAKING  Patient presents to the ER with tachycardia and hypertension.  She is quite anxious on arrival.  She was given Ativan.  Her EKG was abnormal with slurred upstroke of the QRS potentially WPW although PA interval was normal.  This was not definitive.  I obtained a repeat EKG that did not show the same changes.  Does have some nonspecific ST changes.    Laboratory data collected.  Added TSH.    TSH was quite low.  I ordered propranolol.    T4 is elevated.  Delay in propranolol necessitated administration of metoprolol.  I ordered methimazole and IV fluids.    Patient becomes tachycardic into the 130s and 140s even after treatment with ambulation.  I believe presentation represents thyrotoxicosis.  She will be admitted to the hospital for further treatment.  I consulted hospitalist for admission.    FINAL IMPRESSION  Thyrotoxicosis  Tachycardia  Abnormal EKG   anxiety      CRITICAL CARE TIME 30 minutes  There was a very real possibility of deterioration of the patient's condition.  This patient required the highest level of care.  I provided critical care services which included: review of the medical record, treatment orders, ordering and reviewing test results, frequent  reevaluation of the patient's condition and response to treatment, as well as discussing the case with appropriate personnel and various consultants. The critical care time associated with the care of this patient is exclusive of any procedures or specific interventions.      This dictation was created using voice recognition software. The accuracy of the dictation is limited to the abilities of the software.  The nursing notes were reviewed and certain aspects of this information were incorporated into this note.      Electronically signed by: Jorge Mcnally M.D., 11/1/2020 5:12 AM

## 2020-11-01 NOTE — LETTER
November 3, 2020        Melina Mcmahan  [address] [facility] [id number]      To Whom It May Concern,       Melina Mcmahan was admitted to our hospital from 11/02/2020 to 11/03/2020    We kindly ask that you excuse Ms. Mcmahan from any obligations or responsibilities made prior to this acute medical event and take into consideration any follow-up care and treatment.    If you have any questions, please do not hesitate to contact Care Coordination at the number listed below. Thank you for assisting Ms. Mcmahan during this time.      Sincerely,          LILIAM Stoddard MSW  Social Work Care Coordinator II  [phone]  Electronically Signed

## 2020-11-01 NOTE — PROGRESS NOTES
Triage officer note      Melina Mcmahan 30 y.o. female presented to the hospital with complaint of chest pressure. She found to have TSH of <0.005 and Free T4 of 3.81.       Urine drug screen is negative. Troponin X 2 is within normal limits.       CMP showed blood glucose of 112 and normal electrolytes. CBC didn't show any acute abnormalities.    I requested R internal medicine resident Dr. Ford to evaluate this patient for admission.      DX-CHEST-PORTABLE (1 VIEW)   Final Result         No acute cardiac or pulmonary abnormality is identified.

## 2020-11-01 NOTE — SENIOR ADMIT NOTE
"Senior Admit Note    Pertinent History  Melina Mcmahan is a 30 y.o. female who presents for chest pain radiating to her left arm for the last couple of days. In the last two months she has noticed her heart racing causing her to get short of breath with exertion. She has also noticed tremors and leg weakness to the point that it is difficult to get out of bed in the mornings because her legs feel heavy. She also has had trouble swallowing her pills without washing them down with a lot of water. She has had a 12 pound weight loss and has been having loose stools. No orthopnea. Her PCP found her to have abnormal thyroid labs and sent a referral to a specialist, but she has not heard from them yet.    In the ED, she was found to be in sinus tachycardia. EKG confirmed. CXR negative. TSH < 0.005 and T4 3.81.     Most recently recorded vitals  Blood Pressure: 154/74, NIBP MAP (Calculated): 106, Pulse: (!) 109, Respiration: (!) 45, Temperature: 36.2 °C (97.1 °F), Height: 162.6 cm (5' 4\"), Weight: 77.6 kg (171 lb), BMI (Calculated): 29.35, BSA (Calculated): 1.9, Pulse Oximetry: 96 %, O2 (LPM): 0, O2 Delivery Device: None - Room Air    Exam  General: No acute distress.  Eyes: Extraocular movements grossly intact. No exopthalmos.  Throat: No palpated goiter or thyroid enlargement. No erythema or exudates noted.  Neck: Supple. Normal range of motion.  Lungs: No respiratory distress. Clear to auscultation bilaterally.  Cardiovascular: Tachycardic. Regular rhythm.  Abdomen: Soft, non-tender, non-distended.  Extremities: No cyanosis. No edema.  Skin: No rash or suspicious skin lesions noted on exposed skin.  Neurological: Alert and oriented.  Psychiatric: Normal mood and affect.    Assessment  #Thyrotoxicosis  #Hypertension, rule out secondary    Plan  -Methimazole 10 mg  -Metoprolol 25 mg  -Thyroid US. No goiter palpated but patient has been having dysphagia  -Rule out secondary causes of hypertension. Most likely due to thyroid. " Kidney function normal so less likely renal parenchymal disease    Renin:adelaida    Renal duplex US    Cortisol AM    Metenephrines        DVT prophylaxis: Lovenox  Diet: Reg  Code: Full    Please see Dr. Fritz's H&P for full detals

## 2020-11-01 NOTE — ASSESSMENT & PLAN NOTE
Followed and managed by her PCP.   Will look for secondary causes of hypertension  Cortisol: 6.5  Renal Duplex ultrasound: Normal    Plan:  - Renin/aldosterone ration  - Metanephrine level   - Losaratan 25mg Qdaily  - Increase dose of Metoprolol Succinate to 50mg Qdaily

## 2020-11-01 NOTE — ED NOTES
Patient changed into gown and placed on monitor. IV started and labs drawn. EKG completed in triage. Updated patient on plan of care, verbalized understanding. Patient wearing mask on arrival, family at bedside

## 2020-11-01 NOTE — ASSESSMENT & PLAN NOTE
Patient presents with two month history of palpitations, shortness of breath, dry skin, hair loss, weight loss, and shakiness.   On presentation, patient was tachycardic to the 120's  TSH: 0.005, Free T4: 3.81  Patient was given a dose of Methimazole in the ED and Propranolol  Anti-TPO AB: 222  Thyroid ultrasound: findings could indicate thyroiditis. No focal masses are noted in the thyroid gland.      Plan:  - Follow up Free T3  - Follow up TSH receptor Antibodies  - Methimazole 10mg tab Qdaily  - Metoprolol succinate 50mg Qdaily

## 2020-11-01 NOTE — NON-PROVIDER
Internal Medicine Medical Student Admitting History and Physical  Note Author: Carla Singh, Student    Name Melina Mcmahan     1990   Age/Sex 30 y.o. female   MRN [id number]   Code Status Full       Chief Complaint:  Shortness of breath, chest pain/palpitations radiating to left shoulder    HPI:  Melina is a 30 y.o. F with Hx of HTN on metoprolol and losartan who presented 2020 with left shoulder that radiated toward her chest. She states the pain started last night around 6 pm, and did not resolved on its own. She reports previous episodes of this over the past weeks and months which spontaneously resolve and which begin randomly, even at rest. The pain begins in her shoulder and moves toward her chest, and occasionally the left side of her neck. She states over the past 2-3 months she had chest palpitations at rest, shortness of breath at rest, diarrhea, abdominal pain, heat intolerance, dry warm skin, intermittent hair loss. She has had 12 pounds of unintentional weight loss over 2-3 months. She has also noticed minor changes in vision when changing site from computer screen to distance and when waking up in the morning. 2 months ago she was seen by her PCP, diagnosed with HTN and sent home with metoprolol. Two weeks ago she was seen again and found to have abnormal thyroid labs, and had not heard back from a specialist but will have an appointment in the next few weeks. Denies any recent trauma, recent illnesses or sick contacts.     Duration: ~ 10 hours  Location: Left shoulder and chest  Quality: Pressure  Severity: N/A  Timing: Constant   Context: Rest  Modifying Factors: HTN  Associated symptoms: heart palpitations, shortness of breath, shoulder pain    Constitutional: Negative for chills and fever.   HENT: Negative for hearing loss and tinnitus.    Eyes: Negative for blurred vision and double vision.   Respiratory: Negative for cough. Positive shortness of breath.   "  Cardiovascular: Positive for chest pressure and palpitations. Negative for leg swelling.   Gastrointestinal: Positive for abdominal pain and diarrhea. Negative for nausea and vomiting.   Musculoskeletal: Negative for neck pain.   Neurological: Negative for dizziness, focal weakness, weakness and headaches.     Past Medical History (chronic problems, known complications, current management)     Hypertension    Past Surgical History:  C-sections 2x  ( and )  Tubal ligation ()   Dilation and Curettage ()    Medication Allergy/Sensitivities:  Metoprolol 25 mg tablet PO qhs, adherent  Losartan 25 mg tab PO every morning, adherent    Family History:  HTN, DM - father  Thyroid disorders - mother  Cancer - mother side of family    Social History:  Smokin pack/week, smoking earlier this year but quit  Alcohol: None  Illicit drugs: None  Other: No recent travel, works as a  where she often uses computer for long periods of time, walks frequently during her job  Living situation: Lives with  and 5 children    Physical Exam     Vitals:    20 1101 20 1116 20 1146 20 1201   BP: 132/79 119/66 144/73 144/78   Pulse: (!) 102 (!) 101 (!) 108 (!) 118   Resp: (!) 22 (!) 24 (!) 22    Temp:       TempSrc:       SpO2: 98% 99% 97% 98%   Weight:       Height:         Body mass index is 29.35 kg/m².  /78   Pulse (!) 118   Temp 36.2 °C (97.1 °F) (Temporal)   Resp (!) 22   Ht 1.626 m (5' 4\")   Wt 77.6 kg (171 lb)   SpO2 98%   BMI 29.35 kg/m²   O2 therapy: Pulse Oximetry: 98 %, O2 (LPM): 0, O2 Delivery Device: None - Room Air    Physical Exam  Constitutional:       General: No acute distress.      Appearance: Normal appearance. Not ill-appearing or toxic-appearing.   HENT:      Head: Normocephalic and atraumatic.      Mouth: Mucous membranes are moist.   Eyes:      Extraocular Movements: Extraocular movements intact.      Conjunctiva/sclera: Conjunctivae " normal.      Pupils: Pupils are equal, round, and reactive to light.   Neck:      Musculoskeletal: Neck supple.   Cardiovascular:      Rate and Rhythm: Tachycardic and regular rhythm.      Pulses: Normal pulses.      Heart sounds: No murmur. No friction rub. No gallop.    Pulmonary:      Effort: No respiratory distress.      Breath sounds: Normal breath sounds. No stridor. No wheezing, rhonchi or rales.   Abdominal:      General: There is no distension.      Palpations: Abdomen is soft.      Tenderness: Positive right sided abdominal tenderness.  Musculoskeletal: Normal range of motion.         General: No swelling or deformity. Tenderness to palpitation left shoulder  Skin:     General: Skin is warm and dry.      Capillary Refill: Capillary refill takes less than 2 seconds.   Neurological:      Mental Status: A&Ox3.      Cranial Nerves: No cranial nerve deficit.      Sensory: No sensory deficit.      Motor: No weakness.   Psychiatric:         Mood and Affect: Mood normal.         Behavior: Behavior normal.     Assessment/Plan     Problem Representation:  30 y.o. F w/ PMH of HTN who presents to ED with left shoulder pain radiating toward chest and 2-3 month history of palpitations, shortness of breath, weight loss, diarrhea, dry skin and hair loss. EKG and CXR were both normal. TSH: < 0.0005 and Free T4: 3.81. Patient received dose of methimazole, propranolol and 1L IV fluids in ED. She is currently hemodynamically stable with hypertension.     # Thyrotoxicosis  # Primary hyperthyroidism  Assessment:  Patient is a young female who has family history of thyroid disorder on her mother's side, consistent with predisposition for autoimmune disorders including hyperthyroidism.  No tenderness to palpation of thyroid gland on exam and no palpable nodules.    Per my read of thyroid U/S, no findings of punctate microcalcifications, no infiltrating margins   Plan:   Order Free T3   Methimazole 10 mg tab 3x daily   Thyroid  ultrasound, pending radiology report  Follow-up outpatient with endocrinology     # Hypertension (Secondary vs Primary)   Assessment:  Followed and managed by her PCP  Possibly secondary to hyperthyroidism  Will assess for secondary causes of hypertension in part due to her young age    Plan:  - AM cortisol level  - Renin/Aldosterone ratio  - Renal duplex U/S  - Metanephrine level  - Losartan 25 mg daily AM  - Metoprolol 25 mg qhs

## 2020-11-02 ENCOUNTER — APPOINTMENT (OUTPATIENT)
Dept: RADIOLOGY | Facility: MEDICAL CENTER | Age: 30
End: 2020-11-02
Attending: STUDENT IN AN ORGANIZED HEALTH CARE EDUCATION/TRAINING PROGRAM
Payer: COMMERCIAL

## 2020-11-02 LAB
ALBUMIN SERPL BCP-MCNC: 3.7 G/DL (ref 3.2–4.9)
ALBUMIN/GLOB SERPL: 1.4 G/DL
ALP SERPL-CCNC: 66 U/L (ref 30–99)
ALT SERPL-CCNC: 9 U/L (ref 2–50)
ANION GAP SERPL CALC-SCNC: 9 MMOL/L (ref 7–16)
AST SERPL-CCNC: 11 U/L (ref 12–45)
BASOPHILS # BLD AUTO: 0.2 % (ref 0–1.8)
BASOPHILS # BLD: 0.01 K/UL (ref 0–0.12)
BILIRUB SERPL-MCNC: 0.8 MG/DL (ref 0.1–1.5)
BUN SERPL-MCNC: 9 MG/DL (ref 8–22)
CALCIUM SERPL-MCNC: 9.3 MG/DL (ref 8.5–10.5)
CHLORIDE SERPL-SCNC: 108 MMOL/L (ref 96–112)
CO2 SERPL-SCNC: 23 MMOL/L (ref 20–33)
CORTIS SERPL-MCNC: 6.5 UG/DL (ref 0–23)
CREAT SERPL-MCNC: 0.37 MG/DL (ref 0.5–1.4)
EOSINOPHIL # BLD AUTO: 0.1 K/UL (ref 0–0.51)
EOSINOPHIL NFR BLD: 2.4 % (ref 0–6.9)
ERYTHROCYTE [DISTWIDTH] IN BLOOD BY AUTOMATED COUNT: 40.4 FL (ref 35.9–50)
GLOBULIN SER CALC-MCNC: 2.6 G/DL (ref 1.9–3.5)
GLUCOSE SERPL-MCNC: 102 MG/DL (ref 65–99)
HCT VFR BLD AUTO: 36.3 % (ref 37–47)
HGB BLD-MCNC: 11.6 G/DL (ref 12–16)
IMM GRANULOCYTES # BLD AUTO: 0.01 K/UL (ref 0–0.11)
IMM GRANULOCYTES NFR BLD AUTO: 0.2 % (ref 0–0.9)
LYMPHOCYTES # BLD AUTO: 1.88 K/UL (ref 1–4.8)
LYMPHOCYTES NFR BLD: 45.2 % (ref 22–41)
MAGNESIUM SERPL-MCNC: 1.7 MG/DL (ref 1.5–2.5)
MCH RBC QN AUTO: 26.5 PG (ref 27–33)
MCHC RBC AUTO-ENTMCNC: 32 G/DL (ref 33.6–35)
MCV RBC AUTO: 82.9 FL (ref 81.4–97.8)
MONOCYTES # BLD AUTO: 0.44 K/UL (ref 0–0.85)
MONOCYTES NFR BLD AUTO: 10.6 % (ref 0–13.4)
NEUTROPHILS # BLD AUTO: 1.72 K/UL (ref 2–7.15)
NEUTROPHILS NFR BLD: 41.4 % (ref 44–72)
NRBC # BLD AUTO: 0 K/UL
NRBC BLD-RTO: 0 /100 WBC
PHOSPHATE SERPL-MCNC: 4.3 MG/DL (ref 2.5–4.5)
PLATELET # BLD AUTO: 248 K/UL (ref 164–446)
PMV BLD AUTO: 11.1 FL (ref 9–12.9)
POTASSIUM SERPL-SCNC: 4.1 MMOL/L (ref 3.6–5.5)
PROT SERPL-MCNC: 6.3 G/DL (ref 6–8.2)
RBC # BLD AUTO: 4.38 M/UL (ref 4.2–5.4)
SODIUM SERPL-SCNC: 140 MMOL/L (ref 135–145)
THYROPEROXIDASE AB SERPL-ACNC: 222 IU/ML (ref 0–9)
WBC # BLD AUTO: 4.2 K/UL (ref 4.8–10.8)

## 2020-11-02 PROCEDURE — 86376 MICROSOMAL ANTIBODY EACH: CPT

## 2020-11-02 PROCEDURE — 99225 PR SUBSEQUENT OBSERVATION CARE,LEVEL II: CPT | Mod: GC | Performed by: HOSPITALIST

## 2020-11-02 PROCEDURE — G0378 HOSPITAL OBSERVATION PER HR: HCPCS

## 2020-11-02 PROCEDURE — 80053 COMPREHEN METABOLIC PANEL: CPT

## 2020-11-02 PROCEDURE — 83735 ASSAY OF MAGNESIUM: CPT

## 2020-11-02 PROCEDURE — 83520 IMMUNOASSAY QUANT NOS NONAB: CPT

## 2020-11-02 PROCEDURE — 36415 COLL VENOUS BLD VENIPUNCTURE: CPT

## 2020-11-02 PROCEDURE — 82533 TOTAL CORTISOL: CPT

## 2020-11-02 PROCEDURE — 84244 ASSAY OF RENIN: CPT

## 2020-11-02 PROCEDURE — A9270 NON-COVERED ITEM OR SERVICE: HCPCS | Performed by: STUDENT IN AN ORGANIZED HEALTH CARE EDUCATION/TRAINING PROGRAM

## 2020-11-02 PROCEDURE — 700111 HCHG RX REV CODE 636 W/ 250 OVERRIDE (IP): Performed by: STUDENT IN AN ORGANIZED HEALTH CARE EDUCATION/TRAINING PROGRAM

## 2020-11-02 PROCEDURE — 93975 VASCULAR STUDY: CPT

## 2020-11-02 PROCEDURE — 83835 ASSAY OF METANEPHRINES: CPT

## 2020-11-02 PROCEDURE — 700102 HCHG RX REV CODE 250 W/ 637 OVERRIDE(OP): Performed by: STUDENT IN AN ORGANIZED HEALTH CARE EDUCATION/TRAINING PROGRAM

## 2020-11-02 PROCEDURE — 96375 TX/PRO/DX INJ NEW DRUG ADDON: CPT

## 2020-11-02 PROCEDURE — 700101 HCHG RX REV CODE 250: Performed by: STUDENT IN AN ORGANIZED HEALTH CARE EDUCATION/TRAINING PROGRAM

## 2020-11-02 PROCEDURE — 84100 ASSAY OF PHOSPHORUS: CPT

## 2020-11-02 PROCEDURE — 85025 COMPLETE CBC W/AUTO DIFF WBC: CPT

## 2020-11-02 PROCEDURE — 96372 THER/PROPH/DIAG INJ SC/IM: CPT

## 2020-11-02 PROCEDURE — 82088 ASSAY OF ALDOSTERONE: CPT

## 2020-11-02 RX ORDER — METOPROLOL SUCCINATE 50 MG/1
50 TABLET, EXTENDED RELEASE ORAL EVERY EVENING
Status: DISCONTINUED | OUTPATIENT
Start: 2020-11-02 | End: 2020-11-03 | Stop reason: HOSPADM

## 2020-11-02 RX ORDER — MAGNESIUM SULFATE HEPTAHYDRATE 40 MG/ML
2 INJECTION, SOLUTION INTRAVENOUS ONCE
Status: DISCONTINUED | OUTPATIENT
Start: 2020-11-02 | End: 2020-11-02

## 2020-11-02 RX ORDER — MAGNESIUM SULFATE 1 G/100ML
1 INJECTION INTRAVENOUS ONCE
Status: DISCONTINUED | OUTPATIENT
Start: 2020-11-02 | End: 2020-11-02

## 2020-11-02 RX ADMIN — METHIMAZOLE 10 MG: 10 TABLET ORAL at 19:22

## 2020-11-02 RX ADMIN — METHIMAZOLE 10 MG: 10 TABLET ORAL at 04:29

## 2020-11-02 RX ADMIN — LABETALOL HYDROCHLORIDE 10 MG: 5 INJECTION INTRAVENOUS at 15:19

## 2020-11-02 RX ADMIN — ENOXAPARIN SODIUM 40 MG: 40 INJECTION SUBCUTANEOUS at 04:29

## 2020-11-02 RX ADMIN — METOPROLOL SUCCINATE 50 MG: 50 TABLET, EXTENDED RELEASE ORAL at 17:59

## 2020-11-02 RX ADMIN — LOSARTAN POTASSIUM 25 MG: 25 TABLET, FILM COATED ORAL at 04:29

## 2020-11-02 RX ADMIN — METHIMAZOLE 10 MG: 10 TABLET ORAL at 11:19

## 2020-11-02 ASSESSMENT — ENCOUNTER SYMPTOMS
INSOMNIA: 0
BLOOD IN STOOL: 0
FLANK PAIN: 0
NERVOUS/ANXIOUS: 0
LOSS OF CONSCIOUSNESS: 0
FALLS: 0
MYALGIAS: 0
FEVER: 0
SPEECH CHANGE: 0
WHEEZING: 0
BACK PAIN: 0
ORTHOPNEA: 0
WEIGHT LOSS: 1
DIAPHORESIS: 0
BRUISES/BLEEDS EASILY: 0
PHOTOPHOBIA: 0
DOUBLE VISION: 0
EYE DISCHARGE: 0
PALPITATIONS: 1
ABDOMINAL PAIN: 0
DIARRHEA: 0
COUGH: 0
DIZZINESS: 0
DEPRESSION: 0
MEMORY LOSS: 0
POLYDIPSIA: 0
HALLUCINATIONS: 0
HEMOPTYSIS: 0
TREMORS: 0
HEARTBURN: 0
NECK PAIN: 0
EYE PAIN: 0
HEADACHES: 0
FOCAL WEAKNESS: 0
SPUTUM PRODUCTION: 0
CONSTIPATION: 0
EYE REDNESS: 0
SORE THROAT: 0
WEAKNESS: 0
PND: 0
SHORTNESS OF BREATH: 0
VOMITING: 0
SENSORY CHANGE: 0
BLURRED VISION: 0
NAUSEA: 0
SEIZURES: 0
TINGLING: 0
CLAUDICATION: 0
SINUS PAIN: 0
STRIDOR: 0
CHILLS: 0

## 2020-11-02 ASSESSMENT — COGNITIVE AND FUNCTIONAL STATUS - GENERAL
MOBILITY SCORE: 24
SUGGESTED CMS G CODE MODIFIER DAILY ACTIVITY: CH
SUGGESTED CMS G CODE MODIFIER MOBILITY: CH
DAILY ACTIVITIY SCORE: 24

## 2020-11-02 ASSESSMENT — PAIN DESCRIPTION - PAIN TYPE
TYPE: ACUTE PAIN
TYPE: ACUTE PAIN

## 2020-11-02 ASSESSMENT — LIFESTYLE VARIABLES: SUBSTANCE_ABUSE: 0

## 2020-11-02 NOTE — PROGRESS NOTES
2 RN skin check complete with ISAIAS Bryant.   Devices in place: N/A.  Skin assessed under devices: N\A.  Confirmed pressure ulcers found on: N/A.  New potential pressure ulcers noted on N/A. Wound consult placed N/A.  The following interventions in place Pillows.      Bilateral ears are pink and blanching.  Bilateral elbows are pink and blanching.   Buttocks is pink and blanching.   Bilateral toes, lateral and medial side are without issue  Bilateral heels pink and blanching.

## 2020-11-02 NOTE — PROGRESS NOTES
Report received from ISAIAS Montez at 1146. Assumed care, assessment complete. Pt is A & O x 4.  Pt denies having any pain at this time. Apple juice and ice water provided. Patient taking a shower. Fall precautions and appropriate signs in place. Pt oriented to unit routine, call light/phone system and CNA and RN extension number provided. Pt educated regarding fall precautions. Bed locked in lowest position. Pt denies any additional needs at this time. Call light within reach.     I agree with all assessments of this patient from previous RN.

## 2020-11-02 NOTE — PROGRESS NOTES
Received report from Gulshan ESPARZA, at pt bedside.  POC discussed. Call light and belongings within reach. Bed locked and in low position. Alarm on and fall precautions in place.

## 2020-11-02 NOTE — THERAPY
Missed Therapy     Patient Name: Melina Mcmahan  Age:  30 y.o., Sex:  female  Medical Record #: [id number]  Today's Date: 11/2/2020    Discussed missed therapy with ISAIAS Lao.    Orders for clinical swallow evaluation received. Per RN, swallow evaluation is not indicated. RN to cancel.

## 2020-11-02 NOTE — PROGRESS NOTES
Daily Progress Note:     Date of Service: 11/2/2020  Primary Team: [facility] Green Team   Attending: Mehran Huizar M.D.   Senior Resident: Dr. Gonzalez  Intern: Dr. Fritz  Contact:  [phone]    Chief Complaint:   Chest Pain and palpitations     Subjective:  NO acute events overnight. Patient still remains to be mildly tachycardic, but otherwise hemodynamically stable. Patients chest pain improving. Still feels some palpitations. Overall though, she states that she is feeling better. Denies any fever/schills.     Consultants/Specialty:  None    Review of Systems:   Review of Systems   Constitutional: Positive for weight loss. Negative for chills, diaphoresis, fever and malaise/fatigue.   HENT: Negative for congestion, ear discharge, ear pain, hearing loss, nosebleeds, sinus pain, sore throat and tinnitus.    Eyes: Negative for blurred vision, double vision, photophobia, pain, discharge and redness.   Respiratory: Negative for cough, hemoptysis, sputum production, shortness of breath, wheezing and stridor.    Cardiovascular: Positive for chest pain and palpitations. Negative for orthopnea, claudication, leg swelling and PND.   Gastrointestinal: Negative for abdominal pain, blood in stool, constipation, diarrhea, heartburn, melena, nausea and vomiting.   Genitourinary: Negative for dysuria, flank pain, frequency, hematuria and urgency.   Musculoskeletal: Negative for back pain, falls, joint pain, myalgias and neck pain.   Skin: Positive for itching. Negative for rash.   Neurological: Negative for dizziness, tingling, tremors, sensory change, speech change, focal weakness, seizures, loss of consciousness, weakness and headaches.   Endo/Heme/Allergies: Negative for environmental allergies and polydipsia. Does not bruise/bleed easily.   Psychiatric/Behavioral: Negative for depression, hallucinations, memory loss, substance abuse and suicidal ideas. The patient is not nervous/anxious and does not have insomnia.         Objective Data:   Physical Exam:   Vitals:   Temp:  [36.3 °C (97.3 °F)-36.7 °C (98.1 °F)] 36.7 °C (98.1 °F)  Pulse:  [] 100  Resp:  [12-31] 20  BP: (115-153)/(55-97) 133/82  SpO2:  [92 %-98 %] 96 %   CREATE MACRO BE SURE THAT THIS IS PERTINENT TO YOUR PATIENT!  Physical Exam  Constitutional:       General: She is not in acute distress.     Appearance: Normal appearance. She is normal weight.   HENT:      Head: Normocephalic and atraumatic.      Mouth/Throat:      Mouth: Mucous membranes are moist.   Eyes:      Extraocular Movements: Extraocular movements intact.      Pupils: Pupils are equal, round, and reactive to light.   Cardiovascular:      Rate and Rhythm: Regular rhythm. Tachycardia present.      Pulses: Normal pulses.      Heart sounds: No murmur. No friction rub. No gallop.    Pulmonary:      Effort: Pulmonary effort is normal.      Breath sounds: Normal breath sounds.   Abdominal:      General: There is no distension.      Palpations: There is no mass.      Tenderness: There is no abdominal tenderness. There is no right CVA tenderness, left CVA tenderness, guarding or rebound.      Hernia: No hernia is present.   Skin:     General: Skin is warm and dry.      Coloration: Skin is not jaundiced or pale.      Findings: No bruising, erythema, lesion or rash.   Neurological:      General: No focal deficit present.      Mental Status: She is alert and oriented to person, place, and time.   Psychiatric:         Mood and Affect: Mood normal.         Behavior: Behavior normal.           Labs:   Reviewed    Imaging:   Reviewed     Problem Representation: Patient is a 30 y.o female w/ PMH of hypertension, who presents to the ED complaining of chest pain radiating to her shoulder. Patient also having a two month history of palpitations, shortness of breath, weight loss, dry skin, and hair loss. EKG and troponin WNL. CXR negative. TSH: 0.005, Free T4: 3.81. Differential diagnosis is thyrotoxicosis. Patient  received a dose of methimazole and propranolol in the ED.     * Thyrotoxicosis- (present on admission)  Assessment & Plan  Patient presents with two month history of palpitations, shortness of breath, dry skin, hair loss, weight loss, and shakiness.   On presentation, patient was tachycardic to the 120's  TSH: 0.005, Free T4: 3.81  Patient was given a dose of Methimazole in the ED and Propranolol  Anti-TPO AB: 222  Thyroid ultrasound: findings could indicate thyroiditis. No focal masses are noted in the thyroid gland.      Plan:  - Follow up Free T3  - Follow up TSH receptor Antibodies  - Methimazole 10mg tab Qdaily  - Metoprolol succinate 50mg Qdaily     Hypertension  Assessment & Plan  Followed and managed by her PCP.   Will look for secondary causes of hypertension  Cortisol: 6.5  Renal Duplex ultrasound: Normal    Plan:  - Renin/aldosterone ration  - Metanephrine level   - Losaratan 25mg Qdaily  - Increase dose of Metoprolol Succinate to 50mg Qdaily

## 2020-11-02 NOTE — PROGRESS NOTES
Pt arrived to floor. Pt assessed, 2 RN skin check completed and plan of care has been discussed.  currently at bedside. Pt remains in sinus tach. Monitor is on and sensing appropriately. Vitals are stable and pt is without pain. Personal belongings and call bell are within reach.

## 2020-11-02 NOTE — PROGRESS NOTES
· 2 RN skin check complete with ISAIAS Flores.   · Devices in place, right hand PIV  · Skin assessed under devices yes.  · Confirmed pressure ulcers found on N/A.  · New potential pressure ulcers noted on N/A. Wound consult placed? N/A. Photo uploaded? N/A.   · The following interventions are in place early ambulation, frequent turning in bed, moisturizer and bed linen change as needed.

## 2020-11-03 ENCOUNTER — PHARMACY VISIT (OUTPATIENT)
Dept: PHARMACY | Facility: MEDICAL CENTER | Age: 30
End: 2020-11-03
Payer: COMMERCIAL

## 2020-11-03 VITALS
BODY MASS INDEX: 31.24 KG/M2 | OXYGEN SATURATION: 98 % | HEART RATE: 89 BPM | DIASTOLIC BLOOD PRESSURE: 61 MMHG | HEIGHT: 64 IN | SYSTOLIC BLOOD PRESSURE: 138 MMHG | WEIGHT: 182.98 LBS | TEMPERATURE: 97.8 F | RESPIRATION RATE: 18 BRPM

## 2020-11-03 LAB — ALDOST SERPL-MCNC: 4.6 NG/DL

## 2020-11-03 PROCEDURE — 700111 HCHG RX REV CODE 636 W/ 250 OVERRIDE (IP): Performed by: INTERNAL MEDICINE

## 2020-11-03 PROCEDURE — 700102 HCHG RX REV CODE 250 W/ 637 OVERRIDE(OP): Performed by: STUDENT IN AN ORGANIZED HEALTH CARE EDUCATION/TRAINING PROGRAM

## 2020-11-03 PROCEDURE — G0378 HOSPITAL OBSERVATION PER HR: HCPCS

## 2020-11-03 PROCEDURE — 99217 PR OBSERVATION CARE DISCHARGE: CPT | Mod: GC | Performed by: HOSPITALIST

## 2020-11-03 PROCEDURE — 90686 IIV4 VACC NO PRSV 0.5 ML IM: CPT | Performed by: INTERNAL MEDICINE

## 2020-11-03 PROCEDURE — 90471 IMMUNIZATION ADMIN: CPT

## 2020-11-03 PROCEDURE — RXMED WILLOW AMBULATORY MEDICATION CHARGE: Performed by: STUDENT IN AN ORGANIZED HEALTH CARE EDUCATION/TRAINING PROGRAM

## 2020-11-03 PROCEDURE — A9270 NON-COVERED ITEM OR SERVICE: HCPCS | Performed by: STUDENT IN AN ORGANIZED HEALTH CARE EDUCATION/TRAINING PROGRAM

## 2020-11-03 RX ORDER — METHIMAZOLE 10 MG/1
10 TABLET ORAL 3 TIMES DAILY
Qty: 90 TAB | Refills: 0 | Status: SHIPPED | OUTPATIENT
Start: 2020-11-03 | End: 2021-09-08

## 2020-11-03 RX ORDER — METOPROLOL SUCCINATE 50 MG/1
50 TABLET, EXTENDED RELEASE ORAL EVERY EVENING
Qty: 30 TAB | Refills: 3 | Status: SHIPPED | OUTPATIENT
Start: 2020-11-03 | End: 2021-09-08

## 2020-11-03 RX ADMIN — METHIMAZOLE 10 MG: 10 TABLET ORAL at 11:50

## 2020-11-03 RX ADMIN — INFLUENZA A VIRUS A/GUANGDONG-MAONAN/SWL1536/2019 CNIC-1909 (H1N1) ANTIGEN (FORMALDEHYDE INACTIVATED), INFLUENZA A VIRUS A/HONG KONG/2671/2019 (H3N2) ANTIGEN (FORMALDEHYDE INACTIVATED), INFLUENZA B VIRUS B/PHUKET/3073/2013 ANTIGEN (FORMALDEHYDE INACTIVATED), AND INFLUENZA B VIRUS B/WASHINGTON/02/2019 ANTIGEN (FORMALDEHYDE INACTIVATED) 0.5 ML: 15; 15; 15; 15 INJECTION, SUSPENSION INTRAMUSCULAR at 12:37

## 2020-11-03 RX ADMIN — METHIMAZOLE 10 MG: 10 TABLET ORAL at 05:00

## 2020-11-03 RX ADMIN — LOSARTAN POTASSIUM 25 MG: 25 TABLET, FILM COATED ORAL at 05:00

## 2020-11-03 NOTE — CARE PLAN
Problem: Communication  Goal: The ability to communicate needs accurately and effectively will improve  11/3/2020 1139 by Iesha Lara R.N.  Outcome: MET  11/3/2020 0837 by Iesha Lara R.N.  Outcome: PROGRESSING AS EXPECTED  Note: Educated patient to use call light when she is needing help. Call light within reach. Verbalized understanding.      Problem: Safety  Goal: Will remain free from injury  Outcome: MET  Goal: Will remain free from falls  11/3/2020 1139 by Iesha Lara R.N.  Outcome: MET  11/3/2020 0837 by Iesha Lara R.N.  Outcome: PROGRESSING AS EXPECTED  Note: Patients bed in lowest and locked position, call light and side table within reach. Non-skid socks on and educated to use call light when she would like to get up. Verbalized understanding.       Problem: Infection  Goal: Will remain free from infection  11/3/2020 1139 by Iesha Lara R.N.  Outcome: MET  11/3/2020 0837 by Iesha Lara R.N.  Outcome: PROGRESSING AS EXPECTED     Problem: Venous Thromboembolism (VTW)/Deep Vein Thrombosis (DVT) Prevention:  Goal: Patient will participate in Venous Thrombosis (VTE)/Deep Vein Thrombosis (DVT)Prevention Measures  Outcome: MET     Problem: Bowel/Gastric:  Goal: Normal bowel function is maintained or improved  Outcome: MET  Goal: Will not experience complications related to bowel motility  Outcome: MET     Problem: Knowledge Deficit  Goal: Knowledge of disease process/condition, treatment plan, diagnostic tests, and medications will improve  11/3/2020 1139 by Iesha Lara R.N.  Outcome: MET  11/3/2020 0837 by Iesha Lara R.N.  Outcome: PROGRESSING AS EXPECTED  Goal: Knowledge of the prescribed therapeutic regimen will improve  Outcome: MET     Problem: Discharge Barriers/Planning  Goal: Patient's continuum of care needs will be met  Outcome: MET     Problem: Psychosocial Needs:  Goal: Level of anxiety will decrease  11/3/2020 1139 by Iesha HERRERA  ROSI Lara  Outcome: MET  11/3/2020 0837 by Iesha Lara R.N.  Outcome: PROGRESSING AS EXPECTED     Problem: Pain Management  Goal: Pain level will decrease to patient's comfort goal  Outcome: MET

## 2020-11-03 NOTE — CARE PLAN
Problem: Communication  Goal: The ability to communicate needs accurately and effectively will improve  Outcome: PROGRESSING AS EXPECTED     Problem: Safety  Goal: Will remain free from injury  Outcome: PROGRESSING AS EXPECTED       Patient has call light placed at bedside, bed is lowered and placed in locked position. Patient is able to verbalize needs and wants during hospital admission with no difficulty.

## 2020-11-03 NOTE — CARE PLAN
Problem: Communication  Goal: The ability to communicate needs accurately and effectively will improve  Outcome: PROGRESSING AS EXPECTED  Note: Educated patient to use call light when she is needing help. Call light within reach. Verbalized understanding.      Problem: Safety  Goal: Will remain free from falls  Outcome: PROGRESSING AS EXPECTED  Note: Patients bed in lowest and locked position, call light and side table within reach. Non-skid socks on and educated to use call light when she would like to get up. Verbalized understanding.       Problem: Infection  Goal: Will remain free from infection  Outcome: PROGRESSING AS EXPECTED     Problem: Knowledge Deficit  Goal: Knowledge of disease process/condition, treatment plan, diagnostic tests, and medications will improve  Outcome: PROGRESSING AS EXPECTED     Problem: Psychosocial Needs:  Goal: Level of anxiety will decrease  Outcome: PROGRESSING AS EXPECTED

## 2020-11-03 NOTE — DISCHARGE SUMMARY
Discharge Summary    Date of Admission: 11/1/2020  Date of Discharge: No discharge date for patient encounter.  Discharging Attending: Mehran Huizar M.D.   Discharging Senior Resident: Dr. Gonzalez  Discharging Intern: Dr. Fritz    CHIEF COMPLAINT ON ADMISSION  Chief Complaint   Patient presents with   • Chest Pressure     substernal pressure, sharp L shoulder pain. +SOB, feels palpitations        Reason for Admission  Chest Pain and shortness of breath     Admission Date  11/1/2020    CODE STATUS  Full Code    HPI & HOSPITAL COURSE  Patient is a 30 y.o female w/ PMH of hypertension, who presents to the ED complaining of chest pain radiating to her shoulder. Patient also having a two month history of palpitations, shortness of breath, weight loss, dry skin, and hair loss. EKG and troponin WNL. CXR negative. TSH: 0.005, Free T4: 3.81. Patient was admitted for thyrotoxicosis.     #Thyrotoxicosis:  Patient was managed with Methimazole 10mg PO TID. Initially she was started on her home dose of metoprolol succinate, however, due to her continuous tachycardia, we increased the patients dose of Metoprolol Succinate to 50mg PO Qdaily. Patients thyroid ultrasound showed heterogenous changes to the thyroid gland, suggestive of Thyroiditis. No focal masses were visualized. On further workup, patient was found to have a elevated Anti-TPO (222). Differential diagnosis: Graves vs Hashimotos disease. On day of discharged, patient reports that her symptoms have improved. Patient felt stable to go home, and follow up with Dr. Shar Chavez.     #Hypertension  Patient with past medical history of hypertension, which is managed by her PCP. Patient was taking Metoprolol Succinate 25mg Qdaily, and Losartan 25mg Qdaily. Patients hyperthyroid state is likely the patients cause of the patients hypertension. Further workup in the hospital was negative. Cortisol level was negative, and patients Renal U/S was negative for any renal  artery stenosis.     Therefore, she is discharged in good and stable condition to home with close outpatient follow-up.    The patient met 2-midnight criteria for an inpatient stay at the time of discharge.    Discharge Date  11/3/2020    FOLLOW UP ITEMS POST DISCHARGE  Patient should follow up with Dr. Shar Chavez    DISCHARGE DIAGNOSES  Principal Problem:    Thyrotoxicosis POA: Yes  Active Problems:    Hypertension POA: Unknown  Resolved Problems:    * No resolved hospital problems. *      FOLLOW UP    Call Dr. Shar Chavez and make an appointment.   [address]  [place] [address]  [phone]    Shar Chavez M.D.  [address]  [address]  [place] [address]  [phone]    Call today  Call Dr. Chavez's office today to make an appointment. Tell them that you were recently admitted to the hospital and would like to be seen as soon as possible.       MEDICATIONS ON DISCHARGE     Medication List      START taking these medications      Instructions   methimazole 10 MG Tabs  Commonly known as: TAPAZOLE   Take 1 Tab by mouth 3 times a day.  Dose: 10 mg        CHANGE how you take these medications      Instructions   metoprolol SR 50 MG Tb24  What changed:   · medication strength  · how much to take  Commonly known as: TOPROL XL   Take 1 Tab by mouth every evening.  Dose: 50 mg        CONTINUE taking these medications      Instructions   losartan 25 MG Tabs  Commonly known as: COZAAR   Take 25 mg by mouth every morning.  Dose: 25 mg     vitamin D (Ergocalciferol) 1.25 MG (00819 UT) Caps capsule  Commonly known as: DRISDOL   Take 50,000 Units by mouth every Wednesday.  Dose: 50,000 Units        STOP taking these medications    lisinopril 5 MG Tabs  Commonly known as: PRINIVIL            Allergies  Allergies   Allergen Reactions   • Nkda [No Known Drug Allergy]        DIET  Orders Placed This Encounter   Procedures   • Diet Order Diet: Cardiac     Standing Status:   Standing      Number of Occurrences:   1     Order Specific Question:   Diet:     Answer:   Cardiac [6]       ACTIVITY  As tolerated.  Weight bearing as tolerated      PROCEDURES  None    Time spent on discharge: 35 min

## 2020-11-03 NOTE — DISCHARGE PLANNING
Care Transition Team Assessment  LSW met with pt at bedside to complete assessment and discuss discharge plans/barriers. Pt's goal is to return home and denies any concerns/barriers with discharge. Pt requesting letter of dates of admission to hospital for her work. LSW provided pt with letter. Pt follows with PCP JAN Liu and uses the [facility] on [address]. Reported her sister will provide transportation home.     Pt is 30 year old  female who lives with her spouse and has 4 biological and 1 step-children under the age of 18 in the home (14, 14, 13, 7, and 6). She reported good support from her spouse and denied any ADL/iADL needs. No DME used in the home. 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  She denies any financial barriers with meeting basic needs. Denies any substance abuse or behavioral health needs.    Information Source  Orientation : Oriented x 4  Information Given By: Patient  Informant's Name: Melina  Who is responsible for making decisions for patient? : Patient    Readmission Evaluation  Is this a readmission?: No    Elopement Risk  Legal Hold: No  Ambulatory or Self Mobile in Wheelchair: Yes  Disoriented: No  Psychiatric Symptoms: None  History of Wandering: No  Elopement this Admit: No  Vocalizing Wanting to Leave: No  Displays Behaviors, Body Language Wanting to Leave: No-Not at Risk for Elopement  Elopement Risk: Not at Risk for Elopement    Interdisciplinary Discharge Planning  Primary Care Physician: Radha MONTOYA  Lives with - Patient's Self Care Capacity: Spouse, Child Less than 18 Years of Age  Patient or legal guardian wants to designate a caregiver: No  Support Systems: Spouse / Significant Other  Housing / Facility: [address]    Discharge Preparedness  What is your plan after discharge?: Home with help  What are your discharge supports?: Spouse  Prior Functional Level: Ambulatory, Drives Self, Independent with Activities of Daily  Living, Independent with Medication Management  Difficulity with ADLs: None  Difficulity with IADLs: None    Functional Assesment  Prior Functional Level: Ambulatory, Drives Self, Independent with Activities of Daily Living, Independent with Medication Management    Finances  Financial Barriers to Discharge: No  Prescription Coverage: Yes    Vision / Hearing Impairment  Vision Impairment : No  Hearing Impairment : No    Advance Directive  Advance Directive?: None  Advance Directive offered?: AD Booklet given    Domestic Abuse  Have you ever been the victim of abuse or violence?: No  Physical Abuse or Sexual Abuse: No  Verbal Abuse or Emotional Abuse: No  Possible Abuse/Neglect Reported to:: Not Applicable    Psychological Assessment  History of Substance Abuse: None  History of Psychiatric Problems: No  Non-compliant with Treatment: No  Newly Diagnosed Illness: Yes    Discharge Risks or Barriers  Discharge risks or barriers?: No    Anticipated Discharge Information  Discharge Disposition: Discharged to home/self care (01)  Discharge Address: [address] [place] [id number]  Discharge Contact Phone Number: [phone]

## 2020-11-03 NOTE — DISCHARGE PLANNING
Medication reconcilliation completed. Medications delivered to patient at bedside. Patient counseled.    Interventions include:     Melina Mcmahan   Home Medication Instructions ZAYNAB:[id number]    Printed on:11/03/20 5024   Medication Information                      methimazole (TAPAZOLE) 10 MG Tab  Take 1 Tab by mouth 3 times a day.             metoprolol SR (TOPROL XL) 50 MG TABLET SR 24 HR  Take 1 Tab by mouth every evening.

## 2020-11-03 NOTE — PROGRESS NOTES
Report received from Marco HERRERA RN. Assumed care, assessment complete at 2110. Pt is A & O x 4.  Pt denies having any pain at this time. Fall precautions and appropriate signs in place. Pt oriented to unit routine, call light/phone system and RN extension number provided. Pt educated regarding fall precautions. Bed alarm not in use, pt upself. Pt denies any additional needs at this time. Call light within reach.

## 2020-11-03 NOTE — DISCHARGE INSTRUCTIONS
Discharge Instructions    Discharged to home by car with relative. Discharged via wheelchair, hospital escort: Yes.  Special equipment needed: Not Applicable    Be sure to schedule a follow-up appointment with your primary care doctor or any specialists as instructed.     Discharge Plan:   Diet Plan: Discussed  Activity Level: Discussed  Confirmed Follow up Appointment: Patient to Call and Schedule Appointment  Confirmed Symptoms Management: Discussed  Medication Reconciliation Updated: Yes  Influenza Vaccine Indication: Indicated: 9 to 64 years of age    I understand that a diet low in cholesterol, fat, and sodium is recommended for good health. Unless I have been given specific instructions below for another diet, I accept this instruction as my diet prescription.   Other diet: Regular    Special Instructions: None    · Is patient discharged on Warfarin / Coumadin?   No     Depression / Suicide Risk    As you are discharged from this [facility] facility, it is important to learn how to keep safe from harming yourself.    Recognize the warning signs:  · Abrupt changes in personality, positive or negative- including increase in energy   · Giving away possessions  · Change in eating patterns- significant weight changes-  positive or negative  · Change in sleeping patterns- unable to sleep or sleeping all the time   · Unwillingness or inability to communicate  · Depression  · Unusual sadness, discouragement and loneliness  · Talk of wanting to die  · Neglect of personal appearance   · Rebelliousness- reckless behavior  · Withdrawal from people/activities they love  · Confusion- inability to concentrate     If you or a loved one observes any of these behaviors or has concerns about self-harm, here's what you can do:  · Talk about it- your feelings and reasons for harming yourself  · Remove any means that you might use to hurt yourself (examples: pills, rope, extension cords, firearm)  · Get professional help from  the community (Mental Health, Substance Abuse, psychological counseling)  · Do not be alone:Call your Safe Contact- someone whom you trust who will be there for you.  · Call your local CRISIS HOTLINE 906-8694 or 703-293-5740  · Call your local Children's Mobile Crisis Response Team [place] (711) 574-0719 or www.MYOMO  · Call the toll free National Suicide Prevention Hotlines   · National Suicide Prevention Lifeline 214-701-LAVE (3640)  · National Hope Line Network 800-SUICIDE (330-8165)        COVID-19  COVID-19 is a respiratory infection that is caused by a virus called severe acute respiratory syndrome coronavirus 2 (SARS-CoV-2). The disease is also known as coronavirus disease or novel coronavirus. In some people, the virus may not cause any symptoms. In others, it may cause a serious infection. The infection can get worse quickly and can lead to complications, such as:  · Pneumonia, or infection of the lungs.  · Acute respiratory distress syndrome or ARDS. This is fluid build-up in the lungs.  · Acute respiratory failure. This is a condition in which there is not enough oxygen passing from the lungs to the body.  · Sepsis or septic shock. This is a serious bodily reaction to an infection.  · Blood clotting problems.  · Secondary infections due to bacteria or fungus.  The virus that causes COVID-19 is contagious. This means that it can spread from person to person through droplets from coughs and sneezes (respiratory secretions).  What are the causes?  This illness is caused by a virus. You may catch the virus by:  · Breathing in droplets from an infected person's cough or sneeze.  · Touching something, like a table or a doorknob, that was exposed to the virus (contaminated) and then touching your mouth, nose, or eyes.  What increases the risk?  Risk for infection  You are more likely to be infected with this virus if you:  · Live in or travel to an area with a COVID-19 outbreak.  · Come in  contact with a sick person who recently traveled to an area with a COVID-19 outbreak.  · Provide care for or live with a person who is infected with COVID-19.  Risk for serious illness  You are more likely to become seriously ill from the virus if you:  · Are 65 years of age or older.  · Have a long-term disease that lowers your body's ability to fight infection (immunocompromised).  · Live in a nursing home or long-term care facility.  · Have a long-term (chronic) disease such as:  ? Chronic lung disease, including chronic obstructive pulmonary disease or asthma  ? Heart disease.  ? Diabetes.  ? Chronic kidney disease.  ? Liver disease.  · Are obese.  What are the signs or symptoms?  Symptoms of this condition can range from mild to severe. Symptoms may appear any time from 2 to 14 days after being exposed to the virus. They include:  · A fever.  · A cough.  · Difficulty breathing.  · Chills.  · Muscle pains.  · A sore throat.  · Loss of taste or smell.  Some people may also have stomach problems, such as nausea, vomiting, or diarrhea.  Other people may not have any symptoms of COVID-19.  How is this diagnosed?  This condition may be diagnosed based on:  · Your signs and symptoms, especially if:  ? You live in an area with a COVID-19 outbreak.  ? You recently traveled to or from an area where the virus is common.  ? You provide care for or live with a person who was diagnosed with COVID-19.  · A physical exam.  · Lab tests, which may include:  ? A nasal swab to take a sample of fluid from your nose.  ? A throat swab to take a sample of fluid from your throat.  ? A sample of mucus from your lungs (sputum).  ? Blood tests.  · Imaging tests, which may include, X-rays, CT scan, or ultrasound.  How is this treated?  At present, there is no medicine to treat COVID-19. Medicines that treat other diseases are being used on a trial basis to see if they are effective against COVID-19.  Your health care provider will talk  with you about ways to treat your symptoms. For most people, the infection is mild and can be managed at home with rest, fluids, and over-the-counter medicines.  Treatment for a serious infection usually takes places in a hospital intensive care unit (ICU). It may include one or more of the following treatments. These treatments are given until your symptoms improve.  · Receiving fluids and medicines through an IV.  · Supplemental oxygen. Extra oxygen is given through a tube in the nose, a face mask, or a luna.  · Positioning you to lie on your stomach (prone position). This makes it easier for oxygen to get into the lungs.  · Continuous positive airway pressure (CPAP) or bi-level positive airway pressure (BPAP) machine. This treatment uses mild air pressure to keep the airways open. A tube that is connected to a motor delivers oxygen to the body.  · Ventilator. This treatment moves air into and out of the lungs by using a tube that is placed in your windpipe.  · Tracheostomy. This is a procedure to create a hole in the neck so that a breathing tube can be inserted.  · Extracorporeal membrane oxygenation (ECMO). This procedure gives the lungs a chance to recover by taking over the functions of the heart and lungs. It supplies oxygen to the body and removes carbon dioxide.  Follow these instructions at home:  Lifestyle  · If you are sick, stay home except to get medical care. Your health care provider will tell you how long to stay home. Call your health care provider before you go for medical care.  · Rest at home as told by your health care provider.  · Do not use any products that contain nicotine or tobacco, such as cigarettes, e-cigarettes, and chewing tobacco. If you need help quitting, ask your health care provider.  · Return to your normal activities as told by your health care provider. Ask your health care provider what activities are safe for you.  General instructions  · Take over-the-counter and  prescription medicines only as told by your health care provider.  · Drink enough fluid to keep your urine pale yellow.  · Keep all follow-up visits as told by your health care provider. This is important.  How is this prevented?    There is no vaccine to help prevent COVID-19 infection. However, there are steps you can take to protect yourself and others from this virus.  To protect yourself:   · Do not travel to areas where COVID-19 is a risk. The areas where COVID-19 is reported change often. To identify high-risk areas and travel restrictions, check the CDC travel website: wwwnc.cdc.gov/travel/notices  · If you live in, or must travel to, an area where COVID-19 is a risk, take precautions to avoid infection.  ? Stay away from people who are sick.  ? Wash your hands often with soap and water for 20 seconds. If soap and water are not available, use an alcohol-based hand .  ? Avoid touching your mouth, face, eyes, or nose.  ? Avoid going out in public, follow guidance from your state and local health authorities.  ? If you must go out in public, wear a cloth face covering or face mask.  ? Disinfect objects and surfaces that are frequently touched every day. This may include:  § Counters and tables.  § Doorknobs and light switches.  § Sinks and faucets.  § Electronics, such as phones, remote controls, keyboards, computers, and tablets.  To protect others:  If you have symptoms of COVID-19, take steps to prevent the virus from spreading to others.  · If you think you have a COVID-19 infection, contact your health care provider right away. Tell your health care team that you think you may have a COVID-19 infection.  · Stay home. Leave your house only to seek medical care. Do not use public transport.  · Do not travel while you are sick.  · Wash your hands often with soap and water for 20 seconds. If soap and water are not available, use alcohol-based hand .  · Stay away from other members of your  household. Let healthy household members care for children and pets, if possible. If you have to care for children or pets, wash your hands often and wear a mask. If possible, stay in your own room, separate from others. Use a different bathroom.  · Make sure that all people in your household wash their hands well and often.  · Cough or sneeze into a tissue or your sleeve or elbow. Do not cough or sneeze into your hand or into the air.  · Wear a cloth face covering or face mask.  Where to find more information  · Centers for Disease Control and Prevention: www.cdc.gov/coronavirus/2019-ncov/index.html  · World Health Organization: www.who.int/health-topics/coronavirus  Contact a health care provider if:  · You live in or have traveled to an area where COVID-19 is a risk and you have symptoms of the infection.  · You have had contact with someone who has COVID-19 and you have symptoms of the infection.  Get help right away if:  · You have trouble breathing.  · You have pain or pressure in your chest.  · You have confusion.  · You have bluish lips and fingernails.  · You have difficulty waking from sleep.  · You have symptoms that get worse.  These symptoms may represent a serious problem that is an emergency. Do not wait to see if the symptoms will go away. Get medical help right away. Call your local emergency services (911 in the [place] Do not drive yourself to the hospital. Let the emergency medical personnel know if you think you have COVID-19.  Summary  · COVID-19 is a respiratory infection that is caused by a virus. It is also known as coronavirus disease or novel coronavirus. It can cause serious infections, such as pneumonia, acute respiratory distress syndrome, acute respiratory failure, or sepsis.  · The virus that causes COVID-19 is contagious. This means that it can spread from person to person through droplets from coughs and sneezes.  · You are more likely to develop a serious illness if you are 65 years  of age or older, have a weak immunity, live in a nursing home, or have chronic disease.  · There is no medicine to treat COVID-19. Your health care provider will talk with you about ways to treat your symptoms.  · Take steps to protect yourself and others from infection. Wash your hands often and disinfect objects and surfaces that are frequently touched every day. Stay away from people who are sick and wear a mask if you are sick.  This information is not intended to replace advice given to you by your health care provider. Make sure you discuss any questions you have with your health care provider.  Document Released: 01/23/2020 Document Revised: 05/14/2020 Document Reviewed: 01/23/2020  Elsevier Patient Education © 2020 [employer]      Hyperthyroidism    Hyperthyroidism is when the thyroid gland is too active (overactive). The thyroid gland is a small gland located in the lower front part of the neck, just in front of the windpipe (trachea). This gland makes hormones that help control how the body uses food for energy (metabolism) as well as how the heart and brain function. These hormones also play a role in keeping your bones strong. When the thyroid is overactive, it produces too much of a hormone called thyroxine.  What are the causes?  This condition may be caused by:  · Graves' disease. This is a disorder in which the body's disease-fighting system (immune system) attacks the thyroid gland. This is the most common cause.  · Inflammation of the thyroid gland.  · A tumor in the thyroid gland.  · Use of certain medicines, including:  ? Prescription thyroid hormone replacement.  ? Herbal supplements that mimic thyroid hormones.  ? Amiodarone therapy.  · Solid or fluid-filled lumps within your thyroid gland (thyroid nodules).  · Taking in a large amount of iodine from foods or medicines.  What increases the risk?  You are more likely to develop this condition if:  · You are female.  · You have a family  history of thyroid conditions.  · You smoke tobacco.  · You use a medicine called lithium.  · You take medicines that affect the immune system (immunosuppressants).  What are the signs or symptoms?  Symptoms of this condition include:  · Nervousness.  · Inability to tolerate heat.  · Unexplained weight loss.  · Diarrhea.  · Change in the texture of hair or skin.  · Heart skipping beats or making extra beats.  · Rapid heart rate.  · Loss of menstruation.  · Shaky hands.  · Fatigue.  · Restlessness.  · Sleep problems.  · Enlarged thyroid gland or a lump in the thyroid (nodule).  You may also have symptoms of Graves' disease, which may include:  · Protruding eyes.  · Dry eyes.  · Red or swollen eyes.  · Problems with vision.  How is this diagnosed?  This condition may be diagnosed based on:  · Your symptoms and medical history.  · A physical exam.  · Blood tests.  · Thyroid ultrasound. This test involves using sound waves to produce images of the thyroid gland.  · A thyroid scan. A radioactive substance is injected into a vein, and images show how much iodine is present in the thyroid.  · Radioactive iodine uptake test (RAIU). A small amount of radioactive iodine is given by mouth to see how much iodine the thyroid absorbs after a certain amount of time.  How is this treated?  Treatment depends on the cause and severity of the condition. Treatment may include:  · Medicines to reduce the amount of thyroid hormone your body makes.  · Radioactive iodine treatment (radioiodine therapy). This involves swallowing a small dose of radioactive iodine, in capsule or liquid form, to kill thyroid cells.  · Surgery to remove part or all of your thyroid gland. You may need to take thyroid hormone replacement medicine for the rest of your life after thyroid surgery.  · Medicines to help manage your symptoms.  Follow these instructions at home:    · Take over-the-counter and prescription medicines only as told by your health care  provider.  · Do not use any products that contain nicotine or tobacco, such as cigarettes and e-cigarettes. If you need help quitting, ask your health care provider.  · Follow any instructions from your health care provider about diet. You may be instructed to limit foods that contain iodine.  · Keep all follow-up visits as told by your health care provider. This is important.  ? You will need to have blood tests regularly so that your health care provider can monitor your condition.  Contact a health care provider if:  · Your symptoms do not get better with treatment.  · You have a fever.  · You are taking thyroid hormone replacement medicine and you:  ? Have symptoms of depression.  ? Feel like you are tired all the time.  ? Gain weight.  Get help right away if:  · You have chest pain.  · You have decreased alertness or a change in your awareness.  · You have abdominal pain.  · You feel dizzy.  · You have a rapid heartbeat.  · You have an irregular heartbeat.  · You have difficulty breathing.  Summary  · The thyroid gland is a small gland located in the lower front part of the neck, just in front of the windpipe (trachea).  · Hyperthyroidism is when the thyroid gland is too active (overactive) and produces too much of a hormone called thyroxine.  · The most common cause is Graves' disease, a disorder in which your immune system attacks the thyroid gland.  · Hyperthyroidism can cause various symptoms, such as unexplained weight loss, nervousness, inability to tolerate heat, or changes in your heartbeat.  · Treatment may include medicine to reduce the amount of thyroid hormone your body makes, radioiodine therapy, surgery, or medicines to manage symptoms.  This information is not intended to replace advice given to you by your health care provider. Make sure you discuss any questions you have with your health care provider.  Document Released: 12/18/2006 Document Revised: 11/30/2018 Document Reviewed:  11/28/2018  Elsevier Patient Education © 2020 [employer]

## 2020-11-03 NOTE — PROGRESS NOTES
Report received from ISAIAS Solomon at 0700. Assumed care, assessment complete. Pt is A & O x 4.  Pt denies having any pain at this time. Fall precautions and appropriate signs in place. Pt oriented to unit routine, call light/phone system and CNA and RN extension number provided. Pt educated regarding fall precautions. Bed locked in lowest position. Pt denies any additional needs at this time. Call light within reach.

## 2020-11-04 ENCOUNTER — OFFICE VISIT (OUTPATIENT)
Dept: ENDOCRINOLOGY | Facility: MEDICAL CENTER | Age: 30
End: 2020-11-04
Attending: INTERNAL MEDICINE
Payer: COMMERCIAL

## 2020-11-04 VITALS
DIASTOLIC BLOOD PRESSURE: 84 MMHG | OXYGEN SATURATION: 98 % | HEART RATE: 146 BPM | HEIGHT: 64 IN | WEIGHT: 171.9 LBS | RESPIRATION RATE: 16 BRPM | SYSTOLIC BLOOD PRESSURE: 128 MMHG | BODY MASS INDEX: 29.35 KG/M2

## 2020-11-04 DIAGNOSIS — E05.90 THYROTOXICOSIS WITHOUT THYROID STORM, UNSPECIFIED THYROTOXICOSIS TYPE: ICD-10-CM

## 2020-11-04 PROCEDURE — 99211 OFF/OP EST MAY X REQ PHY/QHP: CPT | Performed by: INTERNAL MEDICINE

## 2020-11-04 PROCEDURE — 99204 OFFICE O/P NEW MOD 45 MIN: CPT | Performed by: INTERNAL MEDICINE

## 2020-11-04 PROCEDURE — 99201 HCHG OFFICE VISIT-NEW-LEVEL 1: CPT | Performed by: INTERNAL MEDICINE

## 2020-11-04 RX ORDER — HYDROCODONE BITARTRATE AND ACETAMINOPHEN 5; 325 MG/1; MG/1
TABLET ORAL
COMMUNITY
Start: 2020-08-24 | End: 2021-09-08

## 2020-11-04 RX ORDER — CLINDAMYCIN HYDROCHLORIDE 300 MG/1
CAPSULE ORAL
COMMUNITY
Start: 2020-08-20 | End: 2021-09-08

## 2020-11-04 RX ORDER — AMOXICILLIN 500 MG/1
CAPSULE ORAL
COMMUNITY
Start: 2020-08-17 | End: 2021-09-08

## 2020-11-04 ASSESSMENT — FIBROSIS 4 INDEX: FIB4 SCORE: 0.44

## 2020-11-05 LAB
METANEPHS SERPL-SCNC: 0.13 NMOL/L (ref 0–0.49)
NORMETANEPHRINE SERPL-SCNC: 0.33 NMOL/L (ref 0–0.89)

## 2020-11-05 NOTE — PROGRESS NOTES
Chief Complaint: Consult requested by NATALIE Liu for evaluation of Hyperthyroidism    HPI:     Melina Mcmahan is a 30 y.o. female with history of hypertension who was referred here after recent discharge from [facility] for thyrotoxicosis.    Patient states that for about 3 months she has was experiencing symptoms of palpitations with shortness of breath and lightheadedness accompanied with unintentional weight loss of 12 pounds, diarrhea and difficulty sleeping.  She noted her pulse rate to be elevated through her apple watch which was up to 140s and sometimes 170s just simply walking around.  She did see her PCP who recommended blood work, and showed abnormal thyroid labs and patient was referred to endocrinology however had not yet been able to schedule.    She presented to the emergency room on 11/1/2020 due to chest pain that was not relieved with rest and this concerned her so she sought medical care.  In the emergency room her TSH was low at <0.005 with a free T4 of 3.81 and a free T3 of 19.00.  She was started right away on methimazole 10 mg.  She was already taking a beta-blocker for her hypertension (metoprolol succinate 25 mg) which was then increased to 50 mg daily.  She felt better after 2 days and was discharged home.  Since her discharge she has felt mildly better however she still has palpitations, shortness of breath and lightheadedness.  She has been tolerating the methimazole well.     She reports a family history of Hypothyroidism with her mother but does not know what type of hypothyroidism.  She also states that her cousin on her mother side has thyroid surgery in the past however is unclear about what the particular disease was..   She denies taking thyroid hormone or biotin.  She denies any recent IV contrast exposure.   She denies any recent URI or having neck pain.    Patient's medications, allergies, and social histories were reviewed and updated as  appropriate.    ROS:     CONS:     No fever, no chills, no weight loss, no fatigue   EYES:      No diplopia, no blurry vision, no redness of eyes, no swelling of eyelids   ENT:    No hearing loss, No ear pain, No sore throat, no dysphagia, no neck swelling   CV:     No chest pain, no palpitations, no claudication, no orthopnea, no PND   PULM:    No SOB, no cough, no hemoptysis, no wheezing    GI:   No nausea, no vomiting, no diarrhea, no constipation, no bloody stools   :  Passing urine well, no dysuria, no hematuria   ENDO:   No polyuria, no polydipsia, no heat intolerance, no cold intolerance   NEURO: No headaches, no dizziness, no convulsions, no tremors   MUSC:  No joint swellings, no arthralgias, no myalgias, no weakness   SKIN:   No rash, no ulcers, no dry skin   PSYCH:   No depression, no anxiety, no difficulty sleeping       Past Medical History:  Patient Active Problem List    Diagnosis Date Noted   • Thyrotoxicosis 2020     Priority: High   • Hypertension 2020   • Labor and delivery, indication for care 2014   • Active labor 2014   • History of  delivery 2014   • Supervision of normal IUP (intrauterine pregnancy) in multigravida 2014   • Previous  delivery, antepartum condition or complication 2013   • Supervision of other high-risk pregnancy 2013       Past Surgical History:  Past Surgical History:   Procedure Laterality Date   • REPEAT C SECTION W TUBAL LIGATION  2014    Performed by Barbara Velasco M.D. at [facility]   • DILATION AND CURETTAGE      D & C   • PRIMARY C SECTION  2006    position of baby, face first    • OTHER ABDOMINAL SURGERY      c section        Allergies:  Nkda [no known drug allergy]     Current Medications:    Current Outpatient Medications:   •  metoprolol SR (TOPROL XL) 50 MG TABLET SR 24 HR, Take 1 Tab by mouth every evening., Disp: 30 Tab, Rfl: 3  •  methimazole (TAPAZOLE) 10 MG Tab, Take  1 Tab by mouth 3 times a day., Disp: 90 Tab, Rfl: 0  •  losartan (COZAAR) 25 MG Tab, Take 25 mg by mouth every morning., Disp: , Rfl:   •  vitamin D, Ergocalciferol, (DRISDOL) 1.25 MG (64806 UT) Cap capsule, Take 50,000 Units by mouth every Wednesday., Disp: , Rfl:   •  amoxicillin (AMOXIL) 500 MG Cap, TAKE 2 CAPSULES BY MOUTH NOW THEN 1 CAP EVERY 8 HOURS UNTIL GONE, Disp: , Rfl:   •  clindamycin (CLEOCIN) 300 MG Cap, TAKE 1 CAPSULE BY MOUTH 4 TIMES DAILY UNTIL GONE, Disp: , Rfl:   •  HYDROcodone-acetaminophen (NORCO) 5-325 MG Tab per tablet, TAKE 1 TABLET BY MOUTH EVERY 4 TO 6 HOURS AS NEEDED FOR PAIN FOR 3 DAYS, Disp: , Rfl:     Social History:  Social History     Socioeconomic History   • Marital status:      Spouse name: Not on file   • Number of children: Not on file   • Years of education: Not on file   • Highest education level: Not on file   Occupational History   • Not on file   Social Needs   • Financial resource strain: Not on file   • Food insecurity     Worry: Patient refused     Inability: Patient refused   • Transportation needs     Medical: Patient refused     Non-medical: Patient refused   Tobacco Use   • Smoking status: Former Smoker     Packs/day: 0.25     Years: 3.00     Pack years: 0.75     Types: Cigarettes     Quit date: 3/19/2013     Years since quittin.6   • Smokeless tobacco: Never Used   • Tobacco comment: 1 cig dialy    Substance and Sexual Activity   • Alcohol use: No   • Drug use: No   • Sexual activity: Yes     Partners: Male     Birth control/protection: Condom, Pill     Comment: FOB involved and supportive.   Lifestyle   • Physical activity     Days per week: Not on file     Minutes per session: Not on file   • Stress: Not on file   Relationships   • Social connections     Talks on phone: Not on file     Gets together: Not on file     Attends Gnosticism service: Not on file     Active member of club or organization: Not on file     Attends meetings of clubs or  "organizations: Not on file     Relationship status: Not on file   • Intimate partner violence     Fear of current or ex partner: Not on file     Emotionally abused: Not on file     Physically abused: Not on file     Forced sexual activity: Not on file   Other Topics Concern   • Not on file   Social History Narrative   • Not on file        Family History:   Family History   Problem Relation Age of Onset   • Cancer Mother         unknown   • Diabetes Father         diet and pills   • Hypertension Father    • Diabetes Sister         eldest sister, controlled with diet and pills   • Other Paternal Grandmother         kidney transplant         PHYSICAL EXAM:   Vital signs: /84 (BP Location: Left arm, Patient Position: Sitting, BP Cuff Size: Adult)   Pulse (!) 146   Resp 16   Ht 1.626 m (5' 4\")   Wt 78 kg (171 lb 14.4 oz)   SpO2 98%   BMI 29.51 kg/m²   GENERAL: Well-developed, well-nourished  in no apparent distress.   EYE: No ocular and eyelid asymmetry, Anicteric sclerae,  PERRL, No exophthalmos or lidlag  HENT: Hearing grossly intact, Normocephalic, atraumatic. Pink, moist mucous membranes, No exudate  NECK: Supple. Trachea midline. thyroid mildly enlarged, no nodules palpated, no tenderness.  Not visible  CARDIOVASCULAR: Tachycardic, regular rhythm.  Strong apical impulse.  No murmurs, rubs, or gallops.   LUNGS: Clear to auscultation bilaterally   ABDOMEN: Soft, nontender with positive bowel sounds.   EXTREMITIES: No clubbing, cyanosis, or edema.   NEUROLOGICAL: Cranial nerves II-XII are grossly intact Fine tremor with outstretched hands  LYMPH: No cervical, supraclavicular,  adenopathy palpated.   SKIN: No rashes, lesions. Turgor is normal.  Labs:  Lab Results   Component Value Date/Time    WBC 4.2 (L) 11/02/2020 08:01 AM    RBC 4.38 11/02/2020 08:01 AM    HEMOGLOBIN 11.6 (L) 11/02/2020 08:01 AM    MCV 82.9 11/02/2020 08:01 AM    MCH 26.5 (L) 11/02/2020 08:01 AM    MCHC 32.0 (L) 11/02/2020 08:01 AM    RDW " 40.4 11/02/2020 08:01 AM    MPV 11.1 11/02/2020 08:01 AM       Lab Results   Component Value Date/Time    SODIUM 140 11/02/2020 08:01 AM    POTASSIUM 4.1 11/02/2020 08:01 AM    CHLORIDE 108 11/02/2020 08:01 AM    CO2 23 11/02/2020 08:01 AM    ANION 9.0 11/02/2020 08:01 AM    GLUCOSE 102 (H) 11/02/2020 08:01 AM    BUN 9 11/02/2020 08:01 AM    CREATININE 0.37 (L) 11/02/2020 08:01 AM    CREATININE 0.7 08/06/2006 01:40 AM    CALCIUM 9.3 11/02/2020 08:01 AM    ASTSGOT 11 (L) 11/02/2020 08:01 AM    ALTSGPT 9 11/02/2020 08:01 AM    TBILIRUBIN 0.8 11/02/2020 08:01 AM    ALBUMIN 3.7 11/02/2020 08:01 AM    TOTPROTEIN 6.3 11/02/2020 08:01 AM    GLOBULIN 2.6 11/02/2020 08:01 AM    AGRATIO 1.4 11/02/2020 08:01 AM       Lab Results   Component Value Date/Time    TSHULTRASEN <0.005 (L) 11/01/2020 0504     Lab Results   Component Value Date/Time    FREET4 3.81 (H) 11/01/2020 0504     Lab Results   Component Value Date/Time    FREET3 19.00 (H) 11/01/2020 0504       ASSESSMENT/PLAN:     1. 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  She also does not have any ophthalmopathy on exam, means potentially in the future if methimazole was not optimal she could undergo ESPARZA versus thyroidectomy for permanent treatment of disease.  -Continue methimazole at 10 mg 3 times daily for now  -Continue metoprolol SR 50 mg daily for now, will consider switching to propanolol if patient continues to have palpitations   -Follow-up thyroid receptor antibody  -Discussed potential adverse effects of methimazole including agranulocytosis and toxic hepatitis, patient to stop methimazole and seek emergent medical care if symptoms of either of these.  -Patient may need a work note, however will address at visit next week.    Return in about 1 week (around 11/11/2020).    Thank you  kindly for allowing me to participate in the thyroid care plan for this patient.    11/04/20    CC:   NATALIE Liu    Addendum         I personally reviewed the patient's recent hospital records and reviewed her history in person as well as an examination.  I discussed her case with her and Dr. Whaley and agree the current disposition.               John Rivas M.D.                Board certified Endocrinology

## 2020-11-05 NOTE — PATIENT INSTRUCTIONS
Hyperthyroidism  Hyperthyroidism is when the thyroid gland is too active (overactive). The thyroid gland is a small gland located in the lower front part of the neck, just in front of the windpipe (trachea). This gland makes hormones that help control how the body uses food for energy (metabolism) as well as how the heart and brain function. These hormones also play a role in keeping your bones strong. When the thyroid is overactive, it produces too much of a hormone called thyroxine.  What are the causes?  This condition may be caused by:  · Graves' disease. This is a disorder in which the body's disease-fighting system (immune system) attacks the thyroid gland. This is the most common cause.  · Inflammation of the thyroid gland.  · A tumor in the thyroid gland.  · Use of certain medicines, including:  ? Prescription thyroid hormone replacement.  ? Herbal supplements that mimic thyroid hormones.  ? Amiodarone therapy.  · Solid or fluid-filled lumps within your thyroid gland (thyroid nodules).  · Taking in a large amount of iodine from foods or medicines.  What increases the risk?  You are more likely to develop this condition if:  · You are female.  · You have a family history of thyroid conditions.  · You smoke tobacco.  · You use a medicine called lithium.  · You take medicines that affect the immune system (immunosuppressants).  What are the signs or symptoms?  Symptoms of this condition include:  · Nervousness.  · Inability to tolerate heat.  · Unexplained weight loss.  · Diarrhea.  · Change in the texture of hair or skin.  · Heart skipping beats or making extra beats.  · Rapid heart rate.  · Loss of menstruation.  · Shaky hands.  · Fatigue.  · Restlessness.  · Sleep problems.  · Enlarged thyroid gland or a lump in the thyroid (nodule).  You may also have symptoms of Graves' disease, which may include:  · Protruding eyes.  · Dry eyes.  · Red or swollen eyes.  · Problems with vision.  How is this  diagnosed?  This condition may be diagnosed based on:  · Your symptoms and medical history.  · A physical exam.  · Blood tests.  · Thyroid ultrasound. This test involves using sound waves to produce images of the thyroid gland.  · A thyroid scan. A radioactive substance is injected into a vein, and images show how much iodine is present in the thyroid.  · Radioactive iodine uptake test (RAIU). A small amount of radioactive iodine is given by mouth to see how much iodine the thyroid absorbs after a certain amount of time.  How is this treated?  Treatment depends on the cause and severity of the condition. Treatment may include:  · Medicines to reduce the amount of thyroid hormone your body makes.  · Radioactive iodine treatment (radioiodine therapy). This involves swallowing a small dose of radioactive iodine, in capsule or liquid form, to kill thyroid cells.  · Surgery to remove part or all of your thyroid gland. You may need to take thyroid hormone replacement medicine for the rest of your life after thyroid surgery.  · Medicines to help manage your symptoms.  Follow these instructions at home:    · Take over-the-counter and prescription medicines only as told by your health care provider.  · Do not use any products that contain nicotine or tobacco, such as cigarettes and e-cigarettes. If you need help quitting, ask your health care provider.  · Follow any instructions from your health care provider about diet. You may be instructed to limit foods that contain iodine.  · Keep all follow-up visits as told by your health care provider. This is important.  ? You will need to have blood tests regularly so that your health care provider can monitor your condition.  Contact a health care provider if:  · Your symptoms do not get better with treatment.  · You have a fever.  · You are taking thyroid hormone replacement medicine and you:  ? Have symptoms of depression.  ? Feel like you are tired all the time.  ? Gain  weight.  Get help right away if:  · You have chest pain.  · You have decreased alertness or a change in your awareness.  · You have abdominal pain.  · You feel dizzy.  · You have a rapid heartbeat.  · You have an irregular heartbeat.  · You have difficulty breathing.  Summary  · The thyroid gland is a small gland located in the lower front part of the neck, just in front of the windpipe (trachea).  · Hyperthyroidism is when the thyroid gland is too active (overactive) and produces too much of a hormone called thyroxine.  · The most common cause is Graves' disease, a disorder in which your immune system attacks the thyroid gland.  · Hyperthyroidism can cause various symptoms, such as unexplained weight loss, nervousness, inability to tolerate heat, or changes in your heartbeat.  · Treatment may include medicine to reduce the amount of thyroid hormone your body makes, radioiodine therapy, surgery, or medicines to manage symptoms.  This information is not intended to replace advice given to you by your health care provider. Make sure you discuss any questions you have with your health care provider.  Document Released: 12/18/2006 Document Revised: 11/30/2018 Document Reviewed: 11/28/2018  [employer] Patient Education © 2020 [employer]

## 2020-11-06 LAB — RENIN PLAS-CCNC: 1.8 NG/ML/HR

## 2020-11-08 LAB — TSH RECEP AB SER-ACNC: 7.82 IU/L

## 2020-11-10 ENCOUNTER — HOSPITAL ENCOUNTER (OUTPATIENT)
Dept: LAB | Facility: MEDICAL CENTER | Age: 30
End: 2020-11-10
Attending: STUDENT IN AN ORGANIZED HEALTH CARE EDUCATION/TRAINING PROGRAM
Payer: COMMERCIAL

## 2020-11-10 DIAGNOSIS — E05.90 THYROTOXICOSIS WITHOUT THYROID STORM, UNSPECIFIED THYROTOXICOSIS TYPE: ICD-10-CM

## 2020-11-10 LAB
T4 FREE SERPL-MCNC: 1.66 NG/DL (ref 0.93–1.7)
TSH SERPL DL<=0.005 MIU/L-ACNC: <0.005 UIU/ML (ref 0.38–5.33)

## 2020-11-10 PROCEDURE — 36415 COLL VENOUS BLD VENIPUNCTURE: CPT

## 2020-11-10 PROCEDURE — 84443 ASSAY THYROID STIM HORMONE: CPT

## 2020-11-10 PROCEDURE — 84439 ASSAY OF FREE THYROXINE: CPT

## 2020-11-11 ENCOUNTER — OFFICE VISIT (OUTPATIENT)
Dept: ENDOCRINOLOGY | Facility: MEDICAL CENTER | Age: 30
End: 2020-11-11
Attending: INTERNAL MEDICINE
Payer: COMMERCIAL

## 2020-11-11 VITALS
WEIGHT: 178.2 LBS | DIASTOLIC BLOOD PRESSURE: 72 MMHG | OXYGEN SATURATION: 99 % | HEIGHT: 64 IN | RESPIRATION RATE: 13 BRPM | BODY MASS INDEX: 30.42 KG/M2 | SYSTOLIC BLOOD PRESSURE: 132 MMHG | HEART RATE: 103 BPM

## 2020-11-11 DIAGNOSIS — E05.90 THYROTOXICOSIS WITHOUT THYROID STORM, UNSPECIFIED THYROTOXICOSIS TYPE: ICD-10-CM

## 2020-11-11 DIAGNOSIS — E05.00 GRAVES DISEASE: ICD-10-CM

## 2020-11-11 PROCEDURE — 99211 OFF/OP EST MAY X REQ PHY/QHP: CPT | Performed by: INTERNAL MEDICINE

## 2020-11-11 PROCEDURE — 99213 OFFICE O/P EST LOW 20 MIN: CPT | Performed by: INTERNAL MEDICINE

## 2020-11-11 RX ORDER — PROPRANOLOL HYDROCHLORIDE 20 MG/1
20 TABLET ORAL 3 TIMES DAILY
Qty: 90 TAB | Refills: 11 | Status: SHIPPED | OUTPATIENT
Start: 2020-11-11 | End: 2021-09-08

## 2020-11-11 ASSESSMENT — FIBROSIS 4 INDEX: FIB4 SCORE: 0.44

## 2020-11-11 NOTE — PATIENT INSTRUCTIONS
Stop metoprolol, start propranolol today once you start feeling your heart racing   Continue the methimazole but decrease to 20mg per day (you may taking this morning and night or take it all at once)   Please do lab work (non fasting) prior to next appointment

## 2020-11-11 NOTE — LETTER
November 11, 2020          This is to certify that:  Melina Mcmahan   Has been under my medical care as of    November 2020.       She is unable to return to Work / School for the dates   11/13/2020   to   11/19/2020  .        If you have any questions or concerns please call our office.        Sincerely,               Angela Whaley M.D.    Electronically Signed

## 2020-11-11 NOTE — PROGRESS NOTES
Chief Complaint: Consult requested by NATALIE Liu for evaluation of Hyperthyroidism    HPI:     Maddy Mcmahan is a 30 y.o. female with history of hypertension who was referred here after recent discharge from [facility] for thyrotoxicosis.    Since previous visit, patient has noted a slight improvement of symptoms however feels overall fatigued and is still having palpitations with dizziness and palpitations continue to interrupt her sleep.  She notes that hours prior to taking her next scheduled dose of metoprolol she feels like the medication is wearing off and her heart rate will rise.  It has not been as consistently high however still tends to run about 115's to 120s.  Her diarrhea has improved and she has not lost any more weight.  She has not been doing much as far as activity but when she went to the grocery store over the weekend she felt exhausted afterwards.  She is not ready to go back to work as she still feels so fatigued and as her work involves a lot of movement and walking around she does not think she will be able to handle this.    Results for MADDY MCMAHAN (MRN [id number]) as of 11/11/2020 11:21   Ref. Range 11/2/2020 08:01   Microsomal -Tpo- Abs Latest Ref Range: 0.0 - 9.0 IU/mL 222.0 (H)   Thyrotropin Receptor Abs Latest Ref Range: <=1.75 IU/L 7.82 (H)       Results for MADDY MCMAHAN (MRN [id number]) as of 11/11/2020 11:21   Ref. Range 11/10/2020 08:19   TSH Latest Ref Range: 0.380 - 5.330 uIU/mL <0.005 (L)   Free T-4 Latest Ref Range: 0.93 - 1.70 ng/dL 1.66       Patient's medications, allergies, and social histories were reviewed and updated as appropriate.    ROS:     CONS:     No fever, no chills, no weight loss, no fatigue   EYES:      No diplopia, no blurry vision, no redness of eyes, no swelling of eyelids   ENT:    No hearing loss, No ear pain, No sore throat, no dysphagia, no neck swelling   CV:     No chest pain, no palpitations, no  claudication, no orthopnea, no PND   PULM:    No SOB, no cough, no hemoptysis, no wheezing    GI:   No nausea, no vomiting, no diarrhea, no constipation, no bloody stools   :  Passing urine well, no dysuria, no hematuria   ENDO:   No polyuria, no polydipsia, no heat intolerance, no cold intolerance   NEURO: No headaches, no dizziness, no convulsions, no tremors   MUSC:  No joint swellings, no arthralgias, no myalgias, no weakness   SKIN:   No rash, no ulcers, no dry skin   PSYCH:   No depression, no anxiety, no difficulty sleeping       Past Medical History:  Patient Active Problem List    Diagnosis Date Noted   • Thyrotoxicosis 2020     Priority: High   • Graves disease 2020   • Hypertension 2020   • Labor and delivery, indication for care 2014   • Active labor 2014   • History of  delivery 2014   • Supervision of normal IUP (intrauterine pregnancy) in multigravida 2014   • Previous  delivery, antepartum condition or complication 2013   • Supervision of other high-risk pregnancy 2013       Past Surgical History:  Past Surgical History:   Procedure Laterality Date   • REPEAT C SECTION W TUBAL LIGATION  2014    Performed by Barbara Velasco M.D. at [facility] DEL[facility]   • DILATION AND CURETTAGE      D & C   • PRIMARY C SECTION  2006    position of baby, face first    • OTHER ABDOMINAL SURGERY      c section        Allergies:  Nkda [no known drug allergy]     Current Medications:    Current Outpatient Medications:   •  propranolol (INDERAL) 20 MG Tab, Take 1 Tab by mouth 3 times a day., Disp: 90 Tab, Rfl: 11  •  HYDROcodone-acetaminophen (NORCO) 5-325 MG Tab per tablet, TAKE 1 TABLET BY MOUTH EVERY 4 TO 6 HOURS AS NEEDED FOR PAIN FOR 3 DAYS, Disp: , Rfl:   •  metoprolol SR (TOPROL XL) 50 MG TABLET SR 24 HR, Take 1 Tab by mouth every evening., Disp: 30 Tab, Rfl: 3  •  methimazole (TAPAZOLE) 10 MG Tab, Take 1 Tab by mouth 3 times a  day., Disp: 90 Tab, Rfl: 0  •  losartan (COZAAR) 25 MG Tab, Take 25 mg by mouth every morning., Disp: , Rfl:   •  vitamin D, Ergocalciferol, (DRISDOL) 1.25 MG (79386 UT) Cap capsule, Take 50,000 Units by mouth every Wednesday., Disp: , Rfl:   •  amoxicillin (AMOXIL) 500 MG Cap, TAKE 2 CAPSULES BY MOUTH NOW THEN 1 CAP EVERY 8 HOURS UNTIL GONE, Disp: , Rfl:   •  clindamycin (CLEOCIN) 300 MG Cap, TAKE 1 CAPSULE BY MOUTH 4 TIMES DAILY UNTIL GONE, Disp: , Rfl:     Social History:  Social History     Socioeconomic History   • Marital status:      Spouse name: Not on file   • Number of children: Not on file   • Years of education: Not on file   • Highest education level: Not on file   Occupational History   • Not on file   Social Needs   • Financial resource strain: Not on file   • Food insecurity     Worry: Patient refused     Inability: Patient refused   • Transportation needs     Medical: Patient refused     Non-medical: Patient refused   Tobacco Use   • Smoking status: Former Smoker     Packs/day: 0.25     Years: 3.00     Pack years: 0.75     Types: Cigarettes     Quit date: 3/19/2013     Years since quittin.6   • Smokeless tobacco: Never Used   • Tobacco comment: 1 cig dialy    Substance and Sexual Activity   • Alcohol use: No   • Drug use: No   • Sexual activity: Yes     Partners: Male     Birth control/protection: Condom, Pill     Comment: FOB involved and supportive.   Lifestyle   • Physical activity     Days per week: Not on file     Minutes per session: Not on file   • Stress: Not on file   Relationships   • Social connections     Talks on phone: Not on file     Gets together: Not on file     Attends Spiritism service: Not on file     Active member of club or organization: Not on file     Attends meetings of clubs or organizations: Not on file     Relationship status: Not on file   • Intimate partner violence     Fear of current or ex partner: Not on file     Emotionally abused: Not on file      "Physically abused: Not on file     Forced sexual activity: Not on file   Other Topics Concern   • Not on file   Social History Narrative   • Not on file        Family History:   Family History   Problem Relation Age of Onset   • Cancer Mother         unknown   • Diabetes Father         diet and pills   • Hypertension Father    • Diabetes Sister         eldest sister, controlled with diet and pills   • Other Paternal Grandmother         kidney transplant         PHYSICAL EXAM:   Vital signs: /72 (BP Location: Left arm, Patient Position: Sitting, BP Cuff Size: Adult)   Pulse (!) 103   Resp 13   Ht 1.626 m (5' 4\")   Wt 80.8 kg (178 lb 3.2 oz)   SpO2 99%   BMI 30.59 kg/m²   GENERAL: Well-developed, well-nourished  in no apparent distress.   EYE: No ocular and eyelid asymmetry, Anicteric sclerae,  PERRL, No exophthalmos or lidlag  HENT: Hearing grossly intact, Normocephalic, atraumatic. Pink, moist mucous membranes, No exudate  NECK: Supple. Trachea midline. thyroid mildly enlarged, no nodules palpated, no tenderness.  Not visible  CARDIOVASCULAR: Tachycardic, regular rhythm.  Strong apical impulse.  No murmurs, rubs, or gallops.   LUNGS: Clear to auscultation bilaterally   ABDOMEN: Soft, nontender with positive bowel sounds.   EXTREMITIES: No clubbing, cyanosis, or edema.   NEUROLOGICAL: Cranial nerves II-XII are grossly intact Fine tremor with outstretched hands  LYMPH: No cervical, supraclavicular,  adenopathy palpated.   SKIN: No rashes, lesions. Turgor is normal.  Labs:  Lab Results   Component Value Date/Time    WBC 4.2 (L) 11/02/2020 08:01 AM    RBC 4.38 11/02/2020 08:01 AM    HEMOGLOBIN 11.6 (L) 11/02/2020 08:01 AM    MCV 82.9 11/02/2020 08:01 AM    MCH 26.5 (L) 11/02/2020 08:01 AM    MCHC 32.0 (L) 11/02/2020 08:01 AM    RDW 40.4 11/02/2020 08:01 AM    MPV 11.1 11/02/2020 08:01 AM       Lab Results   Component Value Date/Time    SODIUM 140 11/02/2020 08:01 AM    POTASSIUM 4.1 11/02/2020 08:01 AM    " CHLORIDE 108 11/02/2020 08:01 AM    CO2 23 11/02/2020 08:01 AM    ANION 9.0 11/02/2020 08:01 AM    GLUCOSE 102 (H) 11/02/2020 08:01 AM    BUN 9 11/02/2020 08:01 AM    CREATININE 0.37 (L) 11/02/2020 08:01 AM    CREATININE 0.7 08/06/2006 01:40 AM    CALCIUM 9.3 11/02/2020 08:01 AM    ASTSGOT 11 (L) 11/02/2020 08:01 AM    ALTSGPT 9 11/02/2020 08:01 AM    TBILIRUBIN 0.8 11/02/2020 08:01 AM    ALBUMIN 3.7 11/02/2020 08:01 AM    TOTPROTEIN 6.3 11/02/2020 08:01 AM    GLOBULIN 2.6 11/02/2020 08:01 AM    AGRATIO 1.4 11/02/2020 08:01 AM       Lab Results   Component Value Date/Time    TSHULTRASEN <0.005 (L) 11/01/2020 0504     Lab Results   Component Value Date/Time    FREET4 3.81 (H) 11/01/2020 0504     Lab Results   Component Value Date/Time    FREET3 19.00 (H) 11/01/2020 0504       ASSESSMENT/PLAN:     1. Graves Disease   2. Thyrotoxicosis without thyroid storm, unspecified thyrotoxicosis type  -She may potentially be able to go into remission given the small size of her thyroid gland.  She also does not have any ophthalmopathy on exam, means potentially in the future if methimazole was not optimal she could undergo ESPARZA versus thyroidectomy for permanent treatment of disease.  -Decrease methimazole to 10 mg twice daily  -Replace metoprolol with propranolol 20 mg 3 times daily as needed for palpitations  -Discussed potential adverse effects of methimazole including agranulocytosis and toxic hepatitis, patient to stop methimazole and seek emergent medical care if symptoms of either of these.  -Recheck thyroid studies prior to next visit  -RTW note given to patient    Return in about 4 weeks (around 12/9/2020).    Thank you kindly for allowing me to participate in the thyroid care plan for this patient.    11/04/20  Addendum:           I have reviewed the above statements with Dr. Whaley and patient and agree with the observations stated and disposition.  We expect the TSH to remain suppressed for some time and therefore  follow the free T4 and T3 is the best indicators for thyroid status.  The T4 has come down from 3.8 down to high normal 1.6.  We will reduce methimazole so as to avoid hypothyroidism.  CC:   LENA Liu.

## 2020-12-07 ENCOUNTER — HOSPITAL ENCOUNTER (OUTPATIENT)
Dept: LAB | Facility: MEDICAL CENTER | Age: 30
End: 2020-12-07
Attending: STUDENT IN AN ORGANIZED HEALTH CARE EDUCATION/TRAINING PROGRAM
Payer: COMMERCIAL

## 2020-12-07 ENCOUNTER — HOSPITAL ENCOUNTER (OUTPATIENT)
Dept: LAB | Facility: MEDICAL CENTER | Age: 30
End: 2020-12-07
Attending: INTERNAL MEDICINE
Payer: COMMERCIAL

## 2020-12-07 DIAGNOSIS — E05.90 THYROTOXICOSIS WITHOUT THYROID STORM, UNSPECIFIED THYROTOXICOSIS TYPE: ICD-10-CM

## 2020-12-07 LAB
T3 SERPL-MCNC: 147 NG/DL (ref 60–181)
T3FREE SERPL-MCNC: 3.75 PG/ML (ref 2–4.4)
T4 FREE SERPL-MCNC: 1.24 NG/DL (ref 0.93–1.7)
T4 FREE SERPL-MCNC: 1.26 NG/DL (ref 0.93–1.7)
TSH SERPL DL<=0.005 MIU/L-ACNC: 0.01 UIU/ML (ref 0.38–5.33)

## 2020-12-07 PROCEDURE — 84480 ASSAY TRIIODOTHYRONINE (T3): CPT

## 2020-12-07 PROCEDURE — 84481 FREE ASSAY (FT-3): CPT

## 2020-12-07 PROCEDURE — 84439 ASSAY OF FREE THYROXINE: CPT

## 2020-12-07 PROCEDURE — 36415 COLL VENOUS BLD VENIPUNCTURE: CPT

## 2020-12-07 PROCEDURE — 84443 ASSAY THYROID STIM HORMONE: CPT

## 2020-12-07 PROCEDURE — 84439 ASSAY OF FREE THYROXINE: CPT | Mod: 91

## 2020-12-14 ENCOUNTER — HOSPITAL ENCOUNTER (OUTPATIENT)
Dept: LAB | Facility: MEDICAL CENTER | Age: 30
End: 2020-12-14
Attending: STUDENT IN AN ORGANIZED HEALTH CARE EDUCATION/TRAINING PROGRAM
Payer: COMMERCIAL

## 2020-12-14 LAB — COVID ORDER STATUS COVID19: NORMAL

## 2020-12-14 PROCEDURE — U0003 INFECTIOUS AGENT DETECTION BY NUCLEIC ACID (DNA OR RNA); SEVERE ACUTE RESPIRATORY SYNDROME CORONAVIRUS 2 (SARS-COV-2) (CORONAVIRUS DISEASE [COVID-19]), AMPLIFIED PROBE TECHNIQUE, MAKING USE OF HIGH THROUGHPUT TECHNOLOGIES AS DESCRIBED BY CMS-2020-01-R: HCPCS

## 2020-12-14 PROCEDURE — C9803 HOPD COVID-19 SPEC COLLECT: HCPCS

## 2020-12-15 LAB
SARS-COV-2 RNA RESP QL NAA+PROBE: NOTDETECTED
SPECIMEN SOURCE: NORMAL

## 2021-01-14 ENCOUNTER — HOSPITAL ENCOUNTER (OUTPATIENT)
Dept: LAB | Facility: MEDICAL CENTER | Age: 31
End: 2021-01-14
Attending: INTERNAL MEDICINE
Payer: COMMERCIAL

## 2021-01-14 LAB
T4 FREE SERPL-MCNC: 0.65 NG/DL (ref 0.93–1.7)
TSH SERPL DL<=0.005 MIU/L-ACNC: 3.54 UIU/ML (ref 0.38–5.33)

## 2021-01-14 PROCEDURE — 84439 ASSAY OF FREE THYROXINE: CPT

## 2021-01-14 PROCEDURE — 36415 COLL VENOUS BLD VENIPUNCTURE: CPT

## 2021-01-14 PROCEDURE — 84443 ASSAY THYROID STIM HORMONE: CPT

## 2021-02-05 ENCOUNTER — HOSPITAL ENCOUNTER (OUTPATIENT)
Dept: LAB | Facility: MEDICAL CENTER | Age: 31
End: 2021-02-05
Attending: STUDENT IN AN ORGANIZED HEALTH CARE EDUCATION/TRAINING PROGRAM
Payer: COMMERCIAL

## 2021-02-05 LAB
COVID ORDER STATUS COVID19: NORMAL
SARS-COV-2 RNA RESP QL NAA+PROBE: NOTDETECTED
SPECIMEN SOURCE: NORMAL

## 2021-02-05 PROCEDURE — C9803 HOPD COVID-19 SPEC COLLECT: HCPCS

## 2021-02-05 PROCEDURE — U0003 INFECTIOUS AGENT DETECTION BY NUCLEIC ACID (DNA OR RNA); SEVERE ACUTE RESPIRATORY SYNDROME CORONAVIRUS 2 (SARS-COV-2) (CORONAVIRUS DISEASE [COVID-19]), AMPLIFIED PROBE TECHNIQUE, MAKING USE OF HIGH THROUGHPUT TECHNOLOGIES AS DESCRIBED BY CMS-2020-01-R: HCPCS

## 2021-02-05 PROCEDURE — U0005 INFEC AGEN DETEC AMPLI PROBE: HCPCS

## 2021-02-17 ENCOUNTER — HOSPITAL ENCOUNTER (OUTPATIENT)
Dept: LAB | Facility: MEDICAL CENTER | Age: 31
End: 2021-02-17
Attending: INTERNAL MEDICINE
Payer: COMMERCIAL

## 2021-02-17 LAB
T4 FREE SERPL-MCNC: 0.89 NG/DL (ref 0.93–1.7)
TSH SERPL DL<=0.005 MIU/L-ACNC: 4.76 UIU/ML (ref 0.38–5.33)

## 2021-02-17 PROCEDURE — 84439 ASSAY OF FREE THYROXINE: CPT

## 2021-02-17 PROCEDURE — 36415 COLL VENOUS BLD VENIPUNCTURE: CPT

## 2021-02-17 PROCEDURE — 84443 ASSAY THYROID STIM HORMONE: CPT

## 2021-03-31 ENCOUNTER — HOSPITAL ENCOUNTER (OUTPATIENT)
Dept: LAB | Facility: MEDICAL CENTER | Age: 31
End: 2021-03-31
Attending: INTERNAL MEDICINE
Payer: COMMERCIAL

## 2021-03-31 LAB
T4 FREE SERPL-MCNC: 1.09 NG/DL (ref 0.93–1.7)
TSH SERPL DL<=0.005 MIU/L-ACNC: 2.73 UIU/ML (ref 0.38–5.33)

## 2021-03-31 PROCEDURE — 83516 IMMUNOASSAY NONANTIBODY: CPT

## 2021-03-31 PROCEDURE — 36415 COLL VENOUS BLD VENIPUNCTURE: CPT

## 2021-03-31 PROCEDURE — 84439 ASSAY OF FREE THYROXINE: CPT

## 2021-03-31 PROCEDURE — 84443 ASSAY THYROID STIM HORMONE: CPT

## 2021-04-06 LAB — TEST NAME 95000: NORMAL

## 2021-08-24 ENCOUNTER — HOSPITAL ENCOUNTER (OUTPATIENT)
Dept: LAB | Facility: MEDICAL CENTER | Age: 31
End: 2021-08-24
Attending: INTERNAL MEDICINE
Payer: COMMERCIAL

## 2021-08-24 LAB
T4 FREE SERPL-MCNC: 1.12 NG/DL (ref 0.93–1.7)
TSH SERPL DL<=0.005 MIU/L-ACNC: 2.08 UIU/ML (ref 0.38–5.33)

## 2021-08-24 PROCEDURE — 84443 ASSAY THYROID STIM HORMONE: CPT

## 2021-08-24 PROCEDURE — 36415 COLL VENOUS BLD VENIPUNCTURE: CPT

## 2021-08-24 PROCEDURE — 84439 ASSAY OF FREE THYROXINE: CPT

## 2021-09-07 ENCOUNTER — TELEPHONE (OUTPATIENT)
Dept: CARDIOLOGY | Facility: MEDICAL CENTER | Age: 31
End: 2021-09-07

## 2021-09-07 NOTE — TELEPHONE ENCOUNTER
Spoke to pt in regards to obtaining records for NP appointment with [facility]. Per patient has never been treated by a previous cardiologist. Pt stated she had previous cardiac imaging done at [facility]. Records requested at fax # [phone]. Fax confirmation received. Records pending.   Confirmed all other recent notes, labs, and cardiac imaging are in Epic. Confirmed with patient appointment date, time, and location.

## 2021-09-08 ENCOUNTER — OFFICE VISIT (OUTPATIENT)
Dept: CARDIOLOGY | Facility: MEDICAL CENTER | Age: 31
End: 2021-09-08
Payer: COMMERCIAL

## 2021-09-08 VITALS
BODY MASS INDEX: 33.63 KG/M2 | OXYGEN SATURATION: 98 % | HEART RATE: 90 BPM | WEIGHT: 197 LBS | DIASTOLIC BLOOD PRESSURE: 100 MMHG | RESPIRATION RATE: 14 BRPM | HEIGHT: 64 IN | SYSTOLIC BLOOD PRESSURE: 152 MMHG

## 2021-09-08 DIAGNOSIS — E66.09 CLASS 1 OBESITY DUE TO EXCESS CALORIES WITHOUT SERIOUS COMORBIDITY WITH BODY MASS INDEX (BMI) OF 33.0 TO 33.9 IN ADULT: ICD-10-CM

## 2021-09-08 DIAGNOSIS — E05.90 THYROTOXICOSIS WITHOUT THYROID STORM, UNSPECIFIED THYROTOXICOSIS TYPE: ICD-10-CM

## 2021-09-08 DIAGNOSIS — I10 HYPERTENSION, UNSPECIFIED TYPE: ICD-10-CM

## 2021-09-08 PROBLEM — L83 ACANTHOSIS NIGRICANS: Status: ACTIVE | Noted: 2021-09-08

## 2021-09-08 PROBLEM — E05.00 TOXIC DIFFUSE GOITER WITHOUT CRISIS: Status: ACTIVE | Noted: 2021-09-08

## 2021-09-08 PROBLEM — E66.811 CLASS 1 OBESITY DUE TO EXCESS CALORIES WITHOUT SERIOUS COMORBIDITY WITH BODY MASS INDEX (BMI) OF 33.0 TO 33.9 IN ADULT: Status: ACTIVE | Noted: 2021-09-08

## 2021-09-08 PROCEDURE — 99204 OFFICE O/P NEW MOD 45 MIN: CPT | Performed by: NURSE PRACTITIONER

## 2021-09-08 RX ORDER — METHIMAZOLE 10 MG/1
TABLET ORAL
COMMUNITY
Start: 2021-09-08 | End: 2022-08-04

## 2021-09-08 RX ORDER — OXYBUTYNIN CHLORIDE 10 MG/1
TABLET, EXTENDED RELEASE ORAL
COMMUNITY
Start: 2021-06-21 | End: 2022-02-03

## 2021-09-08 RX ORDER — PROPRANOLOL HYDROCHLORIDE 20 MG/1
20 TABLET ORAL 3 TIMES DAILY PRN
COMMUNITY
Start: 2021-09-08 | End: 2022-02-03

## 2021-09-08 RX ORDER — LOSARTAN POTASSIUM 50 MG/1
50 TABLET ORAL 2 TIMES DAILY
Qty: 180 TABLET | Refills: 3 | Status: SHIPPED | OUTPATIENT
Start: 2021-09-08 | End: 2022-08-22 | Stop reason: SDUPTHER

## 2021-09-08 ASSESSMENT — ENCOUNTER SYMPTOMS
MYALGIAS: 0
HEADACHES: 1
PALPITATIONS: 1
SHORTNESS OF BREATH: 0
FEVER: 0
ORTHOPNEA: 0
DIZZINESS: 1
CLAUDICATION: 0
PND: 0
COUGH: 0
ABDOMINAL PAIN: 0

## 2021-09-08 ASSESSMENT — FIBROSIS 4 INDEX: FIB4 SCORE: .4583333333333333333

## 2021-09-08 NOTE — PROGRESS NOTES
Chief Complaint   Patient presents with   • Hypertension     F/V Dx: Essential (primary) hypertension        Subjective     Melina Mcmahan is a 31 y.o. female who presents today for new patient consultation for labile HTN from her PCP Nell ANAYA.    No cardiac history except HTN. Other medical history new onset of Graves disease with recent thyrotoxicosis with admission. Now seeing Dr. Souza for this. She has had two normal pregnancies with notable preeclampsia with her first pregnancy.    She has recently gained more weight being on her thyroid medication. She doesn't exercise routinely. She overall feels much better on the thyroid medication but did notice palpitations, dizziness, and swollen legs at times. She has headaches when her BP is elevated. Her BP log shows SBP ranging in the 140-150's.    She works as a  for [employer].     She drinks one cup of coffee daily but no alcohol, cigarettes, or drug use.    She is a former smoker but quit when she was diagnosed with HTN.    She tries to eat a healthy diet with less salt.    She has no chest pain or shortness of breath.    Past Medical History:   Diagnosis Date   • Graves disease 11/11/2020   • Hypertension [id number]    put on bedrest at 4mths   • Thyrotoxicosis 11/1/2020     Past Surgical History:   Procedure Laterality Date   • REPEAT C SECTION W TUBAL LIGATION  2/20/2014    Performed by Barbara Velasco M.D. at LAB[facility] DELIVERY   • DILATION AND CURETTAGE  2013    D & C   • PRIMARY C SECTION  2006    position of baby, face first    • OTHER ABDOMINAL SURGERY      c section     Family History   Problem Relation Age of Onset   • Cancer Mother         unknown   • Diabetes Father         diet and pills   • Hypertension Father    • Diabetes Sister         eldest sister, controlled with diet and pills   • Other Paternal Grandmother         kidney transplant     Social History     Socioeconomic History   • Marital status:       Spouse name: Not on file   • Number of children: Not on file   • Years of education: Not on file   • Highest education level: Not on file   Occupational History   • Not on file   Tobacco Use   • Smoking status: Former Smoker     Packs/day: 0.25     Years: 3.00     Pack years: 0.75     Types: Cigarettes     Quit date: 3/19/2013     Years since quittin.4   • Smokeless tobacco: Never Used   • Tobacco comment: 1 cig dialy    Vaping Use   • Vaping Use: Never used   Substance and Sexual Activity   • Alcohol use: No   • Drug use: No   • Sexual activity: Yes     Partners: Male     Birth control/protection: Condom, Pill     Comment: FOB involved and supportive.   Other Topics Concern   • Not on file   Social History Narrative   • Not on file     Social Determinants of Health     Financial Resource Strain:    • Difficulty of Paying Living Expenses:    Food Insecurity: Unknown   • Worried About Running Out of Food in the Last Year: Patient refused   • Ran Out of Food in the Last Year: Patient refused   Transportation Needs: Unknown   • Lack of Transportation (Medical): Patient refused   • Lack of Transportation (Non-Medical): Patient refused   Physical Activity:    • Days of Exercise per Week:    • Minutes of Exercise per Session:    Stress:    • Feeling of Stress :    Social Connections:    • Frequency of Communication with Friends and Family:    • Frequency of Social Gatherings with Friends and Family:    • Attends [employer] Services:    • Active Member of Clubs or Organizations:    • Attends Club or Organization Meetings:    • Marital Status:    Intimate Partner Violence:    • Fear of Current or Ex-Partner:    • Emotionally Abused:    • Physically Abused:    • Sexually Abused:      Allergies   Allergen Reactions   • Nkda [No Known Drug Allergy]      Outpatient Encounter Medications as of 2021   Medication Sig Dispense Refill   • propranolol (INDERAL) 20 MG Tab Take 1 Tablet by mouth 3 times a day as needed  (palpitations).     • losartan (COZAAR) 50 MG Tab Take 1 Tablet by mouth 2 times a day. 180 Tablet 3   • methimazole (TAPAZOLE) 10 MG Tab Take 5-10 mg as prescribed by MD.  Indications: Take 5 mg every other day     • vitamin D, Ergocalciferol, (DRISDOL) 1.25 MG (01724 UT) Cap capsule Take 50,000 Units by mouth every Wednesday.     • [DISCONTINUED] propranolol (INDERAL) 20 MG Tab Take 1 Tab by mouth 3 times a day. 90 Tab 11   • [DISCONTINUED] amoxicillin (AMOXIL) 500 MG Cap TAKE 2 CAPSULES BY MOUTH NOW THEN 1 CAP EVERY 8 HOURS UNTIL GONE (Patient not taking: Reported on 9/8/2021)     • [DISCONTINUED] clindamycin (CLEOCIN) 300 MG Cap TAKE 1 CAPSULE BY MOUTH 4 TIMES DAILY UNTIL GONE (Patient not taking: Reported on 9/8/2021)     • [DISCONTINUED] HYDROcodone-acetaminophen (NORCO) 5-325 MG Tab per tablet TAKE 1 TABLET BY MOUTH EVERY 4 TO 6 HOURS AS NEEDED FOR PAIN FOR 3 DAYS (Patient not taking: Reported on 9/8/2021)     • [DISCONTINUED] metoprolol SR (TOPROL XL) 50 MG TABLET SR 24 HR Take 1 Tab by mouth every evening. (Patient not taking: Reported on 9/8/2021) 30 Tab 3   • [DISCONTINUED] methimazole (TAPAZOLE) 10 MG Tab Take 1 Tab by mouth 3 times a day. (Patient taking differently: Take 10 mg by mouth every day. Indications: Take 5 mg every other day) 90 Tab 0   • [DISCONTINUED] losartan (COZAAR) 25 MG Tab Take 50 mg by mouth every morning.       No facility-administered encounter medications on file as of 9/8/2021.     Review of Systems   Constitutional: Negative for fever and malaise/fatigue.   Respiratory: Negative for cough and shortness of breath.    Cardiovascular: Positive for palpitations. Negative for chest pain, orthopnea, claudication, leg swelling and PND.   Gastrointestinal: Negative for abdominal pain.   Musculoskeletal: Negative for myalgias.   Neurological: Positive for dizziness and headaches.              Objective     /100 (BP Location: Left arm, Patient Position: Sitting, BP Cuff Size: Adult)  "  Pulse 90   Resp 14   Ht 1.626 m (5' 4\")   Wt 89.4 kg (197 lb)   SpO2 98%   BMI 33.81 kg/m²     Physical Exam  Vitals and nursing note reviewed.   Constitutional:       Appearance: Normal appearance. She is well-developed. She is obese.   HENT:      Head: Normocephalic and atraumatic.   Neck:      Vascular: No JVD.   Cardiovascular:      Rate and Rhythm: Normal rate and regular rhythm.      Pulses: Normal pulses.      Heart sounds: Normal heart sounds.   Pulmonary:      Effort: Pulmonary effort is normal.      Breath sounds: Normal breath sounds.   Musculoskeletal:         General: Normal range of motion.   Skin:     General: Skin is warm and dry.      Capillary Refill: Capillary refill takes less than 2 seconds.   Neurological:      General: No focal deficit present.      Mental Status: She is alert and oriented to person, place, and time. Mental status is at baseline.   Psychiatric:         Mood and Affect: Mood normal.         Behavior: Behavior normal.         Thought Content: Thought content normal.         Judgment: Judgment normal.                Assessment & Plan     1. Hypertension, unspecified type     2. Thyrotoxicosis without thyroid storm, unspecified thyrotoxicosis type  propranolol (INDERAL) 20 MG Tab   3. Class 1 obesity due to excess calories without serious comorbidity with body mass index (BMI) of 33.0 to 33.9 in adult         Medical Decision Making: Today's Assessment/Status/Plan:      1. HTN  -moderate control  -recommend increase losartan to 50 mg BID  -follow BP daily 2 hours after medication if able  -keep detailed log, call or mychart by end of week if SBP <130 consistently  -reduce Na in diet, hydrate well, exercise OK,  -recent echo in '20 that showed normal function, no structural concerns or LVH  -recent renal US in '20 showed no renal artery stenosis  -family hx of HTN and medical hx of pre-eclampsia and recent dx of graves disease puts her at risk for HTN  -EKG and labs at PCP " office per patient, obtain these for review  -follow up in 1 month with BP log  -if symptoms occur or patient has concerns, she is not to hesitate to reach out to us for questions/concerns    Patient is to follow up with Lia MONTOYA in 1 month with BP log and EKG/labs from PCP office.

## 2021-09-29 ENCOUNTER — TELEPHONE (OUTPATIENT)
Dept: CARDIOLOGY | Facility: MEDICAL CENTER | Age: 31
End: 2021-09-29

## 2021-09-29 NOTE — TELEPHONE ENCOUNTER
Requested most recent EKG and lab work results from patient's PCP Nell Ferrara at [facility] at fax # [phone] per [facility] last OV note. Fax confirmation received. Records pending.

## 2021-10-06 ENCOUNTER — TELEPHONE (OUTPATIENT)
Dept: CARDIOLOGY | Facility: MEDICAL CENTER | Age: 31
End: 2021-10-06

## 2021-11-05 ENCOUNTER — HOSPITAL ENCOUNTER (OUTPATIENT)
Dept: RADIOLOGY | Facility: MEDICAL CENTER | Age: 31
End: 2021-11-05
Attending: NURSE PRACTITIONER
Payer: COMMERCIAL

## 2021-11-05 ENCOUNTER — OFFICE VISIT (OUTPATIENT)
Dept: URGENT CARE | Facility: PHYSICIAN GROUP | Age: 31
End: 2021-11-05
Payer: COMMERCIAL

## 2021-11-05 ENCOUNTER — HOSPITAL ENCOUNTER (OUTPATIENT)
Facility: MEDICAL CENTER | Age: 31
End: 2021-11-05
Attending: NURSE PRACTITIONER
Payer: COMMERCIAL

## 2021-11-05 VITALS
HEIGHT: 64 IN | WEIGHT: 195 LBS | OXYGEN SATURATION: 98 % | SYSTOLIC BLOOD PRESSURE: 140 MMHG | BODY MASS INDEX: 33.29 KG/M2 | RESPIRATION RATE: 18 BRPM | TEMPERATURE: 98.4 F | DIASTOLIC BLOOD PRESSURE: 80 MMHG | HEART RATE: 80 BPM

## 2021-11-05 DIAGNOSIS — R35.0 URINARY FREQUENCY: ICD-10-CM

## 2021-11-05 DIAGNOSIS — R10.11 RIGHT UPPER QUADRANT ABDOMINAL PAIN: ICD-10-CM

## 2021-11-05 LAB
APPEARANCE UR: CLEAR
BILIRUB UR STRIP-MCNC: NORMAL MG/DL
COLOR UR AUTO: YELLOW
GLUCOSE UR STRIP.AUTO-MCNC: NORMAL MG/DL
KETONES UR STRIP.AUTO-MCNC: NORMAL MG/DL
LEUKOCYTE ESTERASE UR QL STRIP.AUTO: NORMAL
NITRITE UR QL STRIP.AUTO: NORMAL
PH UR STRIP.AUTO: 6 [PH] (ref 5–8)
PROT UR QL STRIP: NORMAL MG/DL
RBC UR QL AUTO: NORMAL
SP GR UR STRIP.AUTO: 1.01
UROBILINOGEN UR STRIP-MCNC: 0.2 MG/DL

## 2021-11-05 PROCEDURE — 99203 OFFICE O/P NEW LOW 30 MIN: CPT | Performed by: NURSE PRACTITIONER

## 2021-11-05 PROCEDURE — 74176 CT ABD & PELVIS W/O CONTRAST: CPT

## 2021-11-05 PROCEDURE — 87086 URINE CULTURE/COLONY COUNT: CPT

## 2021-11-05 PROCEDURE — 81002 URINALYSIS NONAUTO W/O SCOPE: CPT | Performed by: NURSE PRACTITIONER

## 2021-11-05 ASSESSMENT — ENCOUNTER SYMPTOMS
FEVER: 0
CHILLS: 0
NAUSEA: 0
MYALGIAS: 0
ABDOMINAL PAIN: 1
DIARRHEA: 0
VOMITING: 0
FLANK PAIN: 1

## 2021-11-05 ASSESSMENT — FIBROSIS 4 INDEX: FIB4 SCORE: .4583333333333333333

## 2021-11-05 NOTE — PROGRESS NOTES
Subjective:     Melina Mcmahan is a 31 y.o. female who presents for Back Pain (lower back pain, having pain and frequency going to the bathroom. started monday. )      HPI  Pt presents for evaluation of a new problem.  Melina is a very pleasant 31-year-old female presents to urgent care today with complaints of right posterior flank pain that started yesterday.  Her pain remains constant.  Prior to her flank pain starting she did have urinary frequency that began 4 days ago.  She notes that she has been in the bathroom all night having to  Void and also has leakage throughout the day.  This is caused her to need to wear a panty liner.  She denies any painful urination.  Negative for fevers or chills.  She denies any nausea or vomiting.  Associated symptoms include intermittent right lower pelvic pain.     Review of Systems   Constitutional: Negative for chills, fever and malaise/fatigue.   Gastrointestinal: Positive for abdominal pain. Negative for diarrhea, nausea and vomiting.   Genitourinary: Positive for flank pain (right sided), frequency and urgency. Negative for dysuria and hematuria.   Musculoskeletal: Negative for myalgias.       PMH:   Past Medical History:   Diagnosis Date   • Female cystocele 2/13/2015   • Graves disease 11/11/2020   • Hypertension [id number]    put on bedrest at 4mths   • Thyrotoxicosis 11/1/2020     ALLERGIES:   Allergies   Allergen Reactions   • Nkda [No Known Drug Allergy]      SURGHX:   Past Surgical History:   Procedure Laterality Date   • REPEAT C SECTION W TUBAL LIGATION  2/20/2014    Performed by Barbara Velasco M.D. [facility]   • DILATION AND CURETTAGE  2013    [facility]   • PRIMARY C SECTION  2006    position of baby, face first    • OTHER ABDOMINAL SURGERY      c section     SOCHX:   Social History     Socioeconomic History   • Marital status:      Spouse name: Not on file   • Number of children: Not on file   • Years of education: Not on file    • Highest education level: Not on file   Occupational History   • Not on file   Tobacco Use   • Smoking status: Former Smoker     Packs/day: 0.25     Years: 3.00     Pack years: 0.75     Types: Cigarettes     Quit date: 3/19/2013     Years since quittin.6   • Smokeless tobacco: Never Used   • Tobacco comment: 1 cig dialy    Vaping Use   • Vaping Use: Never used   Substance and Sexual Activity   • Alcohol use: No   • Drug use: No   • Sexual activity: Yes     Partners: Male     Birth control/protection: Condom, Pill     Comment: FOB involved and supportive.   Other Topics Concern   • Not on file   Social History Narrative   • Not on file     Social Determinants of Health     Financial Resource Strain:    • Difficulty of Paying Living Expenses:    Food Insecurity: Unknown   • Worried About Running Out of Food in the Last Year: Patient refused   • Ran Out of Food in the Last Year: Patient refused   Transportation Needs: Unknown   • Lack of Transportation (Medical): Patient refused   • Lack of Transportation (Non-Medical): Patient refused   Physical Activity:    • Days of Exercise per Week:    • Minutes of Exercise per Session:    Stress:    • Feeling of Stress :    Social Connections:    • Frequency of Communication with Friends and Family:    • Frequency of Social Gatherings with Friends and Family:    • Attends Jainism Services:    • Active Member of Clubs or Organizations:    • Attends Club or Organization Meetings:    • Marital Status:    Intimate Partner Violence:    • Fear of Current or Ex-Partner:    • Emotionally Abused:    • Physically Abused:    • Sexually Abused:      FH:   Family History   Problem Relation Age of Onset   • Cancer Mother         unknown   • Diabetes Father         diet and pills   • Hypertension Father    • Diabetes Sister         eldest sister, controlled with diet and pills   • Other Paternal Grandmother         kidney transplant         Objective:   /80   Pulse 80   Temp  "36.9 °C (98.4 °F) (Temporal)   Resp 18   Ht 1.626 m (5' 4\")   Wt 88.5 kg (195 lb)   SpO2 98%   BMI 33.47 kg/m²     Physical Exam  Vitals and nursing note reviewed.   Constitutional:       General: She is not in acute distress.     Appearance: Normal appearance. She is not ill-appearing.   HENT:      Head: Normocephalic and atraumatic.      Right Ear: External ear normal.      Left Ear: External ear normal.      Nose: No congestion or rhinorrhea.      Mouth/Throat:      Mouth: Mucous membranes are moist.   Eyes:      Extraocular Movements: Extraocular movements intact.      Pupils: Pupils are equal, round, and reactive to light.   Cardiovascular:      Rate and Rhythm: Normal rate and regular rhythm.      Pulses: Normal pulses.      Heart sounds: Normal heart sounds.   Pulmonary:      Effort: Pulmonary effort is normal.      Breath sounds: Normal breath sounds.   Abdominal:      General: Abdomen is flat. Bowel sounds are normal.      Palpations: Abdomen is soft.      Tenderness: There is abdominal tenderness. There is no right CVA tenderness or left CVA tenderness.       Musculoskeletal:         General: Normal range of motion.      Cervical back: Normal range of motion and neck supple.        Back:       Comments: Severe tenderness to light palpation of right flank   Skin:     General: Skin is warm and dry.      Capillary Refill: Capillary refill takes less than 2 seconds.   Neurological:      General: No focal deficit present.      Mental Status: She is alert and oriented to person, place, and time. Mental status is at baseline.   Psychiatric:         Mood and Affect: Mood normal.         Behavior: Behavior normal.         Thought Content: Thought content normal.         Judgment: Judgment normal.       poct urine: negative  Assessment/Plan:   Assessment    1. Right upper quadrant abdominal pain  CT-RENAL COLIC EVALUATION(A/P W/O)    URINE CULTURE(NEW)    POCT Urinalysis    CANCELED: CT-RENAL COLIC " EVALUATION(A/P W/O)   2. Urinary frequency  URINE CULTURE(NEW)    POCT Urinalysis     We discussed differential diagnoses. I am concerned for a renal calculus at this time. CT renal colic ordered for evaluation. I will call with results. She declined Toradol injection at this time. Urine sent for culture.   AVS handout given and reviewed with patient. Pt educated on red flags and when to seek treatment back in ER or UC.

## 2021-11-06 DIAGNOSIS — R35.0 URINARY FREQUENCY: ICD-10-CM

## 2021-11-06 DIAGNOSIS — R10.11 RIGHT UPPER QUADRANT ABDOMINAL PAIN: ICD-10-CM

## 2021-11-08 LAB
BACTERIA UR CULT: NORMAL
SIGNIFICANT IND 70042: NORMAL
SITE SITE: NORMAL
SOURCE SOURCE: NORMAL

## 2021-12-01 ENCOUNTER — HOSPITAL ENCOUNTER (OUTPATIENT)
Dept: LAB | Facility: MEDICAL CENTER | Age: 31
End: 2021-12-01
Attending: INTERNAL MEDICINE
Payer: COMMERCIAL

## 2021-12-01 LAB
T4 FREE SERPL-MCNC: 1.26 NG/DL (ref 0.93–1.7)
TSH SERPL DL<=0.005 MIU/L-ACNC: 4.1 UIU/ML (ref 0.38–5.33)

## 2021-12-01 PROCEDURE — 36415 COLL VENOUS BLD VENIPUNCTURE: CPT

## 2021-12-01 PROCEDURE — 84443 ASSAY THYROID STIM HORMONE: CPT

## 2021-12-01 PROCEDURE — 84439 ASSAY OF FREE THYROXINE: CPT

## 2022-02-03 ENCOUNTER — OFFICE VISIT (OUTPATIENT)
Dept: CARDIOLOGY | Facility: MEDICAL CENTER | Age: 32
End: 2022-02-03
Payer: COMMERCIAL

## 2022-02-03 VITALS
OXYGEN SATURATION: 100 % | SYSTOLIC BLOOD PRESSURE: 130 MMHG | RESPIRATION RATE: 14 BRPM | DIASTOLIC BLOOD PRESSURE: 80 MMHG | BODY MASS INDEX: 33.97 KG/M2 | WEIGHT: 199 LBS | HEIGHT: 64 IN | HEART RATE: 83 BPM

## 2022-02-03 DIAGNOSIS — I10 HYPERTENSION, UNSPECIFIED TYPE: ICD-10-CM

## 2022-02-03 DIAGNOSIS — E66.09 CLASS 1 OBESITY DUE TO EXCESS CALORIES WITHOUT SERIOUS COMORBIDITY WITH BODY MASS INDEX (BMI) OF 33.0 TO 33.9 IN ADULT: ICD-10-CM

## 2022-02-03 PROCEDURE — 99214 OFFICE O/P EST MOD 30 MIN: CPT | Performed by: NURSE PRACTITIONER

## 2022-02-03 RX ORDER — METHIMAZOLE 5 MG/1
2.5 TABLET ORAL DAILY
COMMUNITY
Start: 2022-01-19 | End: 2022-02-03

## 2022-02-03 RX ORDER — METOPROLOL SUCCINATE 25 MG/1
12.5 TABLET, EXTENDED RELEASE ORAL EVERY EVENING
Qty: 45 TABLET | Refills: 3 | Status: SHIPPED | OUTPATIENT
Start: 2022-02-03 | End: 2022-08-22

## 2022-02-03 RX ORDER — M-VIT,TX,IRON,MINS/CALC/FOLIC 27MG-0.4MG
1 TABLET ORAL DAILY
COMMUNITY

## 2022-02-03 ASSESSMENT — ENCOUNTER SYMPTOMS
FEVER: 0
PALPITATIONS: 1
DIZZINESS: 1
MYALGIAS: 0
SHORTNESS OF BREATH: 0
CLAUDICATION: 0
HEADACHES: 0
PND: 0
ORTHOPNEA: 0
ABDOMINAL PAIN: 0
COUGH: 0

## 2022-02-03 ASSESSMENT — FIBROSIS 4 INDEX: FIB4 SCORE: .4583333333333333333

## 2022-02-03 NOTE — PROGRESS NOTES
Chief Complaint   Patient presents with   • Hypertension     Subjective     Melina Mcmahan is a 31 y.o. female who presents today for BP management.    Hx of HTN, Graves disease (managed by Dr. Souza).    She has had two normal pregnancies with notable preeclampsia with her first pregnancy.    She is overall doing well but does continue with some labile readings with associated chest tightness.    She has made lots of dietary changes and is walking 30 minutes a day.    She has no exertional symptoms. She has some dizziness when walking up stairs at times but not lightheaded.    She works as a  for [employer].     She drinks one cup of coffee daily but no alcohol, cigarettes, or drug use.    She is a former smoker but quit when she was diagnosed with HTN.    Past Medical History:   Diagnosis Date   • Female cystocele 2/13/2015   • Graves disease 11/11/2020   • Hypertension [id number]    put on bedrest at 4mths   • Thyrotoxicosis 11/1/2020     Past Surgical History:   Procedure Laterality Date   • REPEAT C SECTION W TUBAL LIGATION  2/20/2014    Performed by Barbara Velasco M.D. at [facility]   • DILATION AND CURETTAGE  2013    D [facility] C   • PRIMARY C SECTION  2006    position of baby, face first    • OTHER ABDOMINAL SURGERY      c section     Family History   Problem Relation Age of Onset   • Cancer Mother         unknown   • Diabetes Father         diet and pills   • Hypertension Father    • Diabetes Sister         eldest sister, controlled with diet and pills   • Other Paternal Grandmother         kidney transplant     Social History     Socioeconomic History   • Marital status:      Spouse name: Not on file   • Number of children: Not on file   • Years of education: Not on file   • Highest education level: Not on file   Occupational History   • Not on file   Tobacco Use   • Smoking status: Former Smoker     Packs/day: 0.25     Years: 3.00     Pack years: 0.75     Types:  Cigarettes     Quit date: 3/19/2013     Years since quittin.8   • Smokeless tobacco: Never Used   • Tobacco comment: 1 cig dialy    Vaping Use   • Vaping Use: Never used   Substance and Sexual Activity   • Alcohol use: No   • Drug use: No   • Sexual activity: Yes     Partners: Male     Birth control/protection: Condom, Pill     Comment: FOB involved and supportive.   Other Topics Concern   • Not on file   Social History Narrative   • Not on file     Social Determinants of Health     Financial Resource Strain:    • Difficulty of Paying Living Expenses: Not on file   Food Insecurity:    • Worried About Running Out of Food in the Last Year: Not on file   • Ran Out of Food in the Last Year: Not on file   Transportation Needs:    • Lack of Transportation (Medical): Not on file   • Lack of Transportation (Non-Medical): Not on file   Physical Activity:    • Days of Exercise per Week: Not on file   • Minutes of Exercise per Session: Not on file   Stress:    • Feeling of Stress : Not on file   Social Connections:    • Frequency of Communication with Friends and Family: Not on file   • Frequency of Social Gatherings with Friends and Family: Not on file   • Attends Mosque Services: Not on file   • Active Member of Clubs or Organizations: Not on file   • Attends Club or Organization Meetings: Not on file   • Marital Status: Not on file   Intimate Partner Violence:    • Fear of Current or Ex-Partner: Not on file   • Emotionally Abused: Not on file   • Physically Abused: Not on file   • Sexually Abused: Not on file   Housing Stability:    • Unable to Pay for Housing in the Last Year: Not on file   • Number of Places Lived in the Last Year: Not on file   • Unstable Housing in the Last Year: Not on file     Allergies   Allergen Reactions   • Nkda [No Known Drug Allergy]      Outpatient Encounter Medications as of 2/3/2022   Medication Sig Dispense Refill   • therapeutic multivitamin-minerals (THERAGRAN-M) Tab Take 1 Tablet  "by mouth every day.     • metoprolol SR (TOPROL XL) 25 MG TABLET SR 24 HR Take 0.5 Tablets by mouth every evening. 45 Tablet 3   • losartan (COZAAR) 50 MG Tab Take 1 Tablet by mouth 2 times a day. 180 Tablet 3   • methimazole (TAPAZOLE) 10 MG Tab Take 5-10 mg as prescribed by MD.  Indications: Take 5 mg every other day     • vitamin D, Ergocalciferol, (DRISDOL) 1.25 MG (11665 UT) Cap capsule Take 50,000 Units by mouth every Wednesday.     • [DISCONTINUED] methimazole (TAPAZOLE) 5 MG Tab Take 2.5 mg by mouth every day.     • [DISCONTINUED] propranolol (INDERAL) 20 MG Tab Take 1 Tablet by mouth 3 times a day as needed (palpitations).     • [DISCONTINUED] oxybutynin SR (DITROPAN-XL) 10 MG CR tablet Take  by mouth. (Patient not taking: Reported on 11/5/2021)       No facility-administered encounter medications on file as of 2/3/2022.     Review of Systems   Constitutional: Negative for fever and malaise/fatigue.   Respiratory: Negative for cough and shortness of breath.    Cardiovascular: Positive for palpitations. Negative for chest pain, orthopnea, claudication, leg swelling and PND.        When thyroid not being controlled   Gastrointestinal: Negative for abdominal pain.   Musculoskeletal: Negative for myalgias.   Neurological: Positive for dizziness. Negative for headaches.        When walking up stairs                Objective     /80 (BP Location: Left arm, Patient Position: Sitting, BP Cuff Size: Adult)   Pulse 83   Resp 14   Ht 1.626 m (5' 4\")   Wt 90.3 kg (199 lb)   SpO2 100%   BMI 34.16 kg/m²     Physical Exam  Vitals and nursing note reviewed.   Constitutional:       Appearance: Normal appearance. She is well-developed. She is obese.   HENT:      Head: Normocephalic and atraumatic.   Neck:      Vascular: No JVD.   Cardiovascular:      Rate and Rhythm: Normal rate and regular rhythm.      Pulses: Normal pulses.      Heart sounds: Normal heart sounds.   Pulmonary:      Effort: Pulmonary effort is " normal.      Breath sounds: Normal breath sounds.   Musculoskeletal:         General: Normal range of motion.   Skin:     General: Skin is warm and dry.      Capillary Refill: Capillary refill takes less than 2 seconds.   Neurological:      General: No focal deficit present.      Mental Status: She is alert and oriented to person, place, and time. Mental status is at baseline.   Psychiatric:         Mood and Affect: Mood normal.         Behavior: Behavior normal.         Thought Content: Thought content normal.         Judgment: Judgment normal.                Assessment & Plan     1. Class 1 obesity due to excess calories without serious comorbidity with body mass index (BMI) of 33.0 to 33.9 in adult  Comp Metabolic Panel    Lipid Profile   2. Hypertension, unspecified type  Comp Metabolic Panel    Lipid Profile       Medical Decision Making: Today's Assessment/Status/Plan:      1. HTN  -improved control  -cont losartan 50 mg BID  -add toprol 12.5 mg QPM for SBP >130/80  -keep detailed log, call or mychart by end of week if SBP <130 consistently  -cont with diet and exercise changes  -echo in '20 that showed normal function, no structural concerns or LVH  -recent renal US in '20 showed no renal artery stenosis  -family hx of HTN and medical hx of pre-eclampsia and recent dx of graves disease puts her at risk for HTN    Patient is to follow up with Lia MONTOYA in 6 months with BP log and labs.

## 2022-02-24 ENCOUNTER — HOSPITAL ENCOUNTER (OUTPATIENT)
Dept: LAB | Facility: MEDICAL CENTER | Age: 32
End: 2022-02-24
Attending: INTERNAL MEDICINE
Payer: COMMERCIAL

## 2022-02-24 LAB
T4 FREE SERPL-MCNC: 1.22 NG/DL (ref 0.93–1.7)
TSH SERPL DL<=0.005 MIU/L-ACNC: 3.01 UIU/ML (ref 0.38–5.33)

## 2022-02-24 PROCEDURE — 36415 COLL VENOUS BLD VENIPUNCTURE: CPT

## 2022-02-24 PROCEDURE — 84439 ASSAY OF FREE THYROXINE: CPT

## 2022-02-24 PROCEDURE — 84443 ASSAY THYROID STIM HORMONE: CPT

## 2022-03-10 ENCOUNTER — HOSPITAL ENCOUNTER (OUTPATIENT)
Facility: MEDICAL CENTER | Age: 32
End: 2022-03-10
Attending: INTERNAL MEDICINE
Payer: COMMERCIAL

## 2022-03-10 PROCEDURE — 84439 ASSAY OF FREE THYROXINE: CPT

## 2022-03-10 PROCEDURE — 84480 ASSAY TRIIODOTHYRONINE (T3): CPT

## 2022-03-10 PROCEDURE — 84443 ASSAY THYROID STIM HORMONE: CPT

## 2022-03-11 LAB
T3 SERPL-MCNC: 148 NG/DL (ref 60–181)
T4 FREE SERPL-MCNC: 1.15 NG/DL (ref 0.93–1.7)
TSH SERPL DL<=0.005 MIU/L-ACNC: 5.28 UIU/ML (ref 0.38–5.33)

## 2022-05-24 ENCOUNTER — HOSPITAL ENCOUNTER (OUTPATIENT)
Dept: LAB | Facility: MEDICAL CENTER | Age: 32
End: 2022-05-24
Attending: INTERNAL MEDICINE
Payer: COMMERCIAL

## 2022-05-24 ENCOUNTER — HOSPITAL ENCOUNTER (OUTPATIENT)
Dept: LAB | Facility: MEDICAL CENTER | Age: 32
End: 2022-05-24
Attending: NURSE PRACTITIONER
Payer: COMMERCIAL

## 2022-05-24 DIAGNOSIS — I10 HYPERTENSION, UNSPECIFIED TYPE: ICD-10-CM

## 2022-05-24 DIAGNOSIS — E66.09 CLASS 1 OBESITY DUE TO EXCESS CALORIES WITHOUT SERIOUS COMORBIDITY WITH BODY MASS INDEX (BMI) OF 33.0 TO 33.9 IN ADULT: ICD-10-CM

## 2022-05-24 LAB
ALBUMIN SERPL BCP-MCNC: 4.2 G/DL (ref 3.2–4.9)
ALBUMIN/GLOB SERPL: 1.6 G/DL
ALP SERPL-CCNC: 46 U/L (ref 30–99)
ALT SERPL-CCNC: 8 U/L (ref 2–50)
ANION GAP SERPL CALC-SCNC: 12 MMOL/L (ref 7–16)
AST SERPL-CCNC: 16 U/L (ref 12–45)
BILIRUB SERPL-MCNC: 0.3 MG/DL (ref 0.1–1.5)
BUN SERPL-MCNC: 14 MG/DL (ref 8–22)
CALCIUM SERPL-MCNC: 8.7 MG/DL (ref 8.5–10.5)
CHLORIDE SERPL-SCNC: 109 MMOL/L (ref 96–112)
CHOLEST SERPL-MCNC: 194 MG/DL (ref 100–199)
CO2 SERPL-SCNC: 19 MMOL/L (ref 20–33)
CREAT SERPL-MCNC: 0.72 MG/DL (ref 0.5–1.4)
FASTING STATUS PATIENT QL REPORTED: NORMAL
GFR SERPLBLD CREATININE-BSD FMLA CKD-EPI: 114 ML/MIN/1.73 M 2
GLOBULIN SER CALC-MCNC: 2.6 G/DL (ref 1.9–3.5)
GLUCOSE SERPL-MCNC: 90 MG/DL (ref 65–99)
HDLC SERPL-MCNC: 44 MG/DL
LDLC SERPL CALC-MCNC: 123 MG/DL
POTASSIUM SERPL-SCNC: 4 MMOL/L (ref 3.6–5.5)
PROT SERPL-MCNC: 6.8 G/DL (ref 6–8.2)
SODIUM SERPL-SCNC: 140 MMOL/L (ref 135–145)
T4 FREE SERPL-MCNC: 1.1 NG/DL (ref 0.93–1.7)
TRIGL SERPL-MCNC: 136 MG/DL (ref 0–149)
TSH SERPL DL<=0.005 MIU/L-ACNC: 2.44 UIU/ML (ref 0.38–5.33)

## 2022-05-24 PROCEDURE — 36415 COLL VENOUS BLD VENIPUNCTURE: CPT

## 2022-05-24 PROCEDURE — 84443 ASSAY THYROID STIM HORMONE: CPT

## 2022-05-24 PROCEDURE — 84439 ASSAY OF FREE THYROXINE: CPT

## 2022-05-24 PROCEDURE — 83520 IMMUNOASSAY QUANT NOS NONAB: CPT

## 2022-05-24 PROCEDURE — 80053 COMPREHEN METABOLIC PANEL: CPT

## 2022-05-24 PROCEDURE — 84445 ASSAY OF TSI GLOBULIN: CPT

## 2022-05-24 PROCEDURE — 80061 LIPID PANEL: CPT

## 2022-05-26 LAB
TSH RECEP AB SER-ACNC: <0.8 IU/L
TSI SER-ACNC: 0.38 IU/L

## 2022-07-12 ENCOUNTER — HOSPITAL ENCOUNTER (OUTPATIENT)
Dept: LAB | Facility: MEDICAL CENTER | Age: 32
End: 2022-07-12
Attending: INTERNAL MEDICINE
Payer: COMMERCIAL

## 2022-07-12 LAB
T4 FREE SERPL-MCNC: 1.03 NG/DL (ref 0.93–1.7)
TSH SERPL DL<=0.005 MIU/L-ACNC: 2.91 UIU/ML (ref 0.38–5.33)

## 2022-07-12 PROCEDURE — 84439 ASSAY OF FREE THYROXINE: CPT

## 2022-07-12 PROCEDURE — 36415 COLL VENOUS BLD VENIPUNCTURE: CPT

## 2022-07-12 PROCEDURE — 84443 ASSAY THYROID STIM HORMONE: CPT

## 2022-07-26 ENCOUNTER — HOSPITAL ENCOUNTER (OUTPATIENT)
Dept: LAB | Facility: MEDICAL CENTER | Age: 32
End: 2022-07-26
Attending: STUDENT IN AN ORGANIZED HEALTH CARE EDUCATION/TRAINING PROGRAM
Payer: COMMERCIAL

## 2022-07-26 LAB
BASOPHILS # BLD AUTO: 0.4 % (ref 0–1.8)
BASOPHILS # BLD: 0.03 K/UL (ref 0–0.12)
EOSINOPHIL # BLD AUTO: 0.11 K/UL (ref 0–0.51)
EOSINOPHIL NFR BLD: 1.6 % (ref 0–6.9)
ERYTHROCYTE [DISTWIDTH] IN BLOOD BY AUTOMATED COUNT: 45 FL (ref 35.9–50)
HCT VFR BLD AUTO: 36.4 % (ref 37–47)
HGB BLD-MCNC: 11.7 G/DL (ref 12–16)
IMM GRANULOCYTES # BLD AUTO: 0.03 K/UL (ref 0–0.11)
IMM GRANULOCYTES NFR BLD AUTO: 0.4 % (ref 0–0.9)
LYMPHOCYTES # BLD AUTO: 2.5 K/UL (ref 1–4.8)
LYMPHOCYTES NFR BLD: 36.4 % (ref 22–41)
MCH RBC QN AUTO: 26.1 PG (ref 27–33)
MCHC RBC AUTO-ENTMCNC: 32.1 G/DL (ref 33.6–35)
MCV RBC AUTO: 81.1 FL (ref 81.4–97.8)
MONOCYTES # BLD AUTO: 0.48 K/UL (ref 0–0.85)
MONOCYTES NFR BLD AUTO: 7 % (ref 0–13.4)
NEUTROPHILS # BLD AUTO: 3.71 K/UL (ref 2–7.15)
NEUTROPHILS NFR BLD: 54.2 % (ref 44–72)
NRBC # BLD AUTO: 0 K/UL
NRBC BLD-RTO: 0 /100 WBC
PLATELET # BLD AUTO: 301 K/UL (ref 164–446)
PMV BLD AUTO: 12.1 FL (ref 9–12.9)
RBC # BLD AUTO: 4.49 M/UL (ref 4.2–5.4)
WBC # BLD AUTO: 6.9 K/UL (ref 4.8–10.8)

## 2022-07-26 PROCEDURE — 84443 ASSAY THYROID STIM HORMONE: CPT

## 2022-07-26 PROCEDURE — 36415 COLL VENOUS BLD VENIPUNCTURE: CPT

## 2022-07-26 PROCEDURE — 85025 COMPLETE CBC W/AUTO DIFF WBC: CPT

## 2022-07-26 PROCEDURE — 80053 COMPREHEN METABOLIC PANEL: CPT

## 2022-07-27 LAB
ALBUMIN SERPL BCP-MCNC: 4.5 G/DL (ref 3.2–4.9)
ALBUMIN/GLOB SERPL: 1.8 G/DL
ALP SERPL-CCNC: 45 U/L (ref 30–99)
ALT SERPL-CCNC: 11 U/L (ref 2–50)
ANION GAP SERPL CALC-SCNC: 11 MMOL/L (ref 7–16)
AST SERPL-CCNC: 17 U/L (ref 12–45)
BILIRUB SERPL-MCNC: 0.3 MG/DL (ref 0.1–1.5)
BUN SERPL-MCNC: 9 MG/DL (ref 8–22)
CALCIUM SERPL-MCNC: 8.7 MG/DL (ref 8.5–10.5)
CHLORIDE SERPL-SCNC: 104 MMOL/L (ref 96–112)
CO2 SERPL-SCNC: 21 MMOL/L (ref 20–33)
CREAT SERPL-MCNC: 0.72 MG/DL (ref 0.5–1.4)
GFR SERPLBLD CREATININE-BSD FMLA CKD-EPI: 114 ML/MIN/1.73 M 2
GLOBULIN SER CALC-MCNC: 2.5 G/DL (ref 1.9–3.5)
GLUCOSE SERPL-MCNC: 156 MG/DL (ref 65–99)
POTASSIUM SERPL-SCNC: 3.4 MMOL/L (ref 3.6–5.5)
PROT SERPL-MCNC: 7 G/DL (ref 6–8.2)
SODIUM SERPL-SCNC: 136 MMOL/L (ref 135–145)
TSH SERPL DL<=0.005 MIU/L-ACNC: 1.73 UIU/ML (ref 0.38–5.33)

## 2022-08-04 ENCOUNTER — OFFICE VISIT (OUTPATIENT)
Dept: CARDIOLOGY | Facility: MEDICAL CENTER | Age: 32
End: 2022-08-04
Payer: COMMERCIAL

## 2022-08-04 ENCOUNTER — NON-PROVIDER VISIT (OUTPATIENT)
Dept: CARDIOLOGY | Facility: MEDICAL CENTER | Age: 32
End: 2022-08-04
Attending: NURSE PRACTITIONER
Payer: COMMERCIAL

## 2022-08-04 VITALS
BODY MASS INDEX: 33.63 KG/M2 | HEART RATE: 100 BPM | RESPIRATION RATE: 16 BRPM | WEIGHT: 197 LBS | HEIGHT: 64 IN | OXYGEN SATURATION: 98 % | DIASTOLIC BLOOD PRESSURE: 84 MMHG | SYSTOLIC BLOOD PRESSURE: 120 MMHG

## 2022-08-04 DIAGNOSIS — R00.2 PALPITATIONS: ICD-10-CM

## 2022-08-04 DIAGNOSIS — I49.1 APC (ATRIAL PREMATURE CONTRACTIONS): ICD-10-CM

## 2022-08-04 DIAGNOSIS — E66.09 CLASS 1 OBESITY DUE TO EXCESS CALORIES WITHOUT SERIOUS COMORBIDITY WITH BODY MASS INDEX (BMI) OF 33.0 TO 33.9 IN ADULT: ICD-10-CM

## 2022-08-04 DIAGNOSIS — I10 HYPERTENSION, UNSPECIFIED TYPE: ICD-10-CM

## 2022-08-04 DIAGNOSIS — I49.3 PVC'S (PREMATURE VENTRICULAR CONTRACTIONS): ICD-10-CM

## 2022-08-04 PROCEDURE — 99214 OFFICE O/P EST MOD 30 MIN: CPT | Performed by: NURSE PRACTITIONER

## 2022-08-04 RX ORDER — LOSARTAN POTASSIUM 100 MG/1
100 TABLET ORAL DAILY
COMMUNITY
Start: 2022-07-26 | End: 2022-08-04

## 2022-08-04 RX ORDER — METHIMAZOLE 5 MG/1
2.5 TABLET ORAL DAILY
COMMUNITY
Start: 2022-07-26 | End: 2023-09-20

## 2022-08-04 ASSESSMENT — ENCOUNTER SYMPTOMS
SHORTNESS OF BREATH: 0
FEVER: 0
MYALGIAS: 0
PND: 0
HEADACHES: 0
ORTHOPNEA: 0
ABDOMINAL PAIN: 0
CLAUDICATION: 0
COUGH: 0
DIZZINESS: 0
PALPITATIONS: 1

## 2022-08-04 ASSESSMENT — FIBROSIS 4 INDEX: FIB4 SCORE: 0.54

## 2022-08-04 NOTE — PROGRESS NOTES
Chief Complaint   Patient presents with   • Other     F/V Dx: Class 1 obesity due to excess calories without serious comorbidity with body mass index (BMI) of 33.0 to 33.9 in adult   • Hypertension     F/V Dx: Hypertension, unspecified type     Subjective     Melina Mcmahan is a 31 y.o. female who presents today for BP management.    Hx of HTN, Graves disease (managed by Dr. Souza).    She has had two normal pregnancies with notable preeclampsia (first pregnancy only) as well as labile BP since her pregnancies related to Grave's disease.     She was taken off her thyroid medication and then had a hypertensive episode with SBP in the 160's with L eye visual changes. She saw her PCP who did an EKG and urged for medication changes. She didn't tolerate the higher dose of metoprolol. We will request EKG.    She was making lots of dietary changes and was walking 30 minutes a day and is hoping to get back to this.    She does admit to some anxiety related to her diagnoses and will talk to her PCP about this.    She has no exertional symptoms.     She works as a  for [id number] and works 16 hour shifts for 3 days straight.    She drinks one cup of coffee daily but no alcohol, cigarettes, or drug use.    She is a former smoker but quit when she was diagnosed with HTN.    Past Medical History:   Diagnosis Date   • Female cystocele 2/13/2015   • Graves disease 11/11/2020   • Hypertension [id number]    put on bedrest at 4mths   • Thyrotoxicosis 11/1/2020     Past Surgical History:   Procedure Laterality Date   • REPEAT C SECTION W TUBAL LIGATION  2/20/2014    Performed by Barbara Velasco M.D. at [facility]   • DILATION AND CURETTAGE  2013    D [facility] C   • PRIMARY C SECTION  2006    position of baby, face first    • OTHER ABDOMINAL SURGERY      c section     Family History   Problem Relation Age of Onset   • Cancer Mother         unknown   • Diabetes Father         diet and pills   • Hypertension  Father    • Diabetes Sister         eldest sister, controlled with diet and pills   • Other Paternal Grandmother         kidney transplant     Social History     Socioeconomic History   • Marital status:      Spouse name: Not on file   • Number of children: Not on file   • Years of education: Not on file   • Highest education level: Not on file   Occupational History   • Not on file   Tobacco Use   • Smoking status: Former Smoker     Packs/day: 0.25     Years: 3.00     Pack years: 0.75     Types: Cigarettes     Quit date: 3/19/2013     Years since quittin.3   • Smokeless tobacco: Never Used   • Tobacco comment: 1 cig dialy    Vaping Use   • Vaping Use: Never used   Substance and Sexual Activity   • Alcohol use: No   • Drug use: No   • Sexual activity: Yes     Partners: Male     Birth control/protection: Condom, Pill     Comment: FOB involved and supportive.   Other Topics Concern   • Not on file   Social History Narrative   • Not on file     Social Determinants of Health     Financial Resource Strain: Not on file   Food Insecurity: Not on file   Transportation Needs: Not on file   Physical Activity: Not on file   Stress: Not on file   Social Connections: Not on file   Intimate Partner Violence: Not on file   Housing Stability: Not on file     Allergies   Allergen Reactions   • Nkda [No Known Drug Allergy]      Outpatient Encounter Medications as of 2022   Medication Sig Dispense Refill   • methimazole (TAPAZOLE) 5 MG Tab Take 2.5 mg by mouth every day.     • therapeutic multivitamin-minerals (THERAGRAN-M) Tab Take 1 Tablet by mouth every day.     • metoprolol SR (TOPROL XL) 25 MG TABLET SR 24 HR Take 0.5 Tablets by mouth every evening. 45 Tablet 3   • losartan (COZAAR) 50 MG Tab Take 1 Tablet by mouth 2 times a day. 180 Tablet 3   • [DISCONTINUED] losartan (COZAAR) 100 MG Tab Take 100 mg by mouth every day. (Patient not taking: Reported on 2022)     • [DISCONTINUED] methimazole (TAPAZOLE) 10 MG  "Tab Take 5-10 mg as prescribed by MD.  Indications: Take 5 mg every other day (Patient not taking: Reported on 8/4/2022)     • [DISCONTINUED] vitamin D, Ergocalciferol, (DRISDOL) 1.25 MG (82427 UT) Cap capsule Take 50,000 Units by mouth every Wednesday. (Patient not taking: Reported on 8/4/2022)       No facility-administered encounter medications on file as of 8/4/2022.     Review of Systems   Constitutional: Negative for fever and malaise/fatigue.   Eyes:        L eye blurry when BP elevated   Respiratory: Negative for cough and shortness of breath.    Cardiovascular: Positive for palpitations. Negative for chest pain, orthopnea, claudication, leg swelling and PND.        When thyroid not being controlled   Gastrointestinal: Negative for abdominal pain.   Musculoskeletal: Negative for myalgias.   Neurological: Negative for dizziness and headaches.                        Objective     /84 (BP Location: Left arm, Patient Position: Sitting, BP Cuff Size: Adult)   Pulse 100   Resp 16   Ht 1.626 m (5' 4\")   Wt 89.4 kg (197 lb)   SpO2 98%   BMI 33.81 kg/m²     Physical Exam  Vitals and nursing note reviewed.   Constitutional:       Appearance: Normal appearance. She is well-developed. She is obese.   HENT:      Head: Normocephalic and atraumatic.   Neck:      Vascular: No JVD.   Cardiovascular:      Rate and Rhythm: Normal rate and regular rhythm.      Pulses: Normal pulses.      Heart sounds: Normal heart sounds.   Pulmonary:      Effort: Pulmonary effort is normal.      Breath sounds: Normal breath sounds.   Musculoskeletal:         General: Normal range of motion.   Skin:     General: Skin is warm and dry.      Capillary Refill: Capillary refill takes less than 2 seconds.   Neurological:      General: No focal deficit present.      Mental Status: She is alert and oriented to person, place, and time. Mental status is at baseline.   Psychiatric:         Mood and Affect: Mood normal.         Behavior: " Behavior normal.         Thought Content: Thought content normal.         Judgment: Judgment normal.                Assessment & Plan     1. Class 1 obesity due to excess calories without serious comorbidity with body mass index (BMI) of 33.0 to 33.9 in adult     2. Hypertension, unspecified type     3. Palpitations  Holter Monitor Study       Medical Decision Making: Today's Assessment/Status/Plan:      1. HTN  -one hypertensive event related to dc of thyroid medication  -cont losartan 50 mg BID and toprol 12.5 mg QPM  -OK to increase metoprolol to 25 mg QPM  -order heart monitor for palpitations and tachycardia  -recommend anxiety management with PCP   -cont with diet and exercise changes  -call for elevated readings or concerns prior to next apt  -echo in '20 that showed normal function, no structural concerns or LVH  -recent renal US in '20 showed no renal artery stenosis  -family hx of HTN and medical hx of pre-eclampsia and recent dx of graves disease puts her at risk for HTN    Patient is to follow up with Lia MONTOYA in 3 weeks with heart monitor and review of symptoms.

## 2022-08-04 NOTE — PROGRESS NOTES
Patient enrolled in the 7 day [facility] heart monitoring program, per Lia Rawls A.P.R.N.  >In clinic hook up, monitor S/N [id number].  >Baseline Transmitted.  >Pending EOS.

## 2022-08-04 NOTE — PATIENT INSTRUCTIONS
We will obtain a heart monitor to evaluate your fast heart rhythms.     Call for worsening symptoms.    OK to take 12.5 mg metoprolol for BP top # >140 or HR >110 at rest.    See you back in 3 weeks.

## 2022-08-16 ENCOUNTER — HOSPITAL ENCOUNTER (OUTPATIENT)
Dept: LAB | Facility: MEDICAL CENTER | Age: 32
End: 2022-08-16
Attending: INTERNAL MEDICINE
Payer: COMMERCIAL

## 2022-08-16 LAB
T4 FREE SERPL-MCNC: 1.07 NG/DL (ref 0.93–1.7)
TSH SERPL DL<=0.005 MIU/L-ACNC: 2.95 UIU/ML (ref 0.38–5.33)

## 2022-08-16 PROCEDURE — 84443 ASSAY THYROID STIM HORMONE: CPT

## 2022-08-16 PROCEDURE — 36415 COLL VENOUS BLD VENIPUNCTURE: CPT

## 2022-08-16 PROCEDURE — 84439 ASSAY OF FREE THYROXINE: CPT

## 2022-08-19 ENCOUNTER — TELEPHONE (OUTPATIENT)
Dept: CARDIOLOGY | Facility: MEDICAL CENTER | Age: 32
End: 2022-08-19
Payer: COMMERCIAL

## 2022-08-19 PROCEDURE — 93268 ECG RECORD/REVIEW: CPT | Performed by: INTERNAL MEDICINE

## 2022-08-22 ENCOUNTER — OFFICE VISIT (OUTPATIENT)
Dept: CARDIOLOGY | Facility: MEDICAL CENTER | Age: 32
End: 2022-08-22
Payer: COMMERCIAL

## 2022-08-22 VITALS
HEIGHT: 64 IN | OXYGEN SATURATION: 95 % | HEART RATE: 117 BPM | DIASTOLIC BLOOD PRESSURE: 78 MMHG | BODY MASS INDEX: 34.31 KG/M2 | RESPIRATION RATE: 14 BRPM | WEIGHT: 201 LBS | SYSTOLIC BLOOD PRESSURE: 148 MMHG

## 2022-08-22 DIAGNOSIS — R00.2 PALPITATIONS: ICD-10-CM

## 2022-08-22 DIAGNOSIS — E66.09 CLASS 1 OBESITY DUE TO EXCESS CALORIES WITHOUT SERIOUS COMORBIDITY WITH BODY MASS INDEX (BMI) OF 34.0 TO 34.9 IN ADULT: ICD-10-CM

## 2022-08-22 DIAGNOSIS — R00.0 TACHYCARDIA: ICD-10-CM

## 2022-08-22 DIAGNOSIS — I10 HYPERTENSION, UNSPECIFIED TYPE: ICD-10-CM

## 2022-08-22 LAB — EKG IMPRESSION: NORMAL

## 2022-08-22 PROCEDURE — 93000 ELECTROCARDIOGRAM COMPLETE: CPT | Performed by: INTERNAL MEDICINE

## 2022-08-22 PROCEDURE — 99214 OFFICE O/P EST MOD 30 MIN: CPT | Performed by: NURSE PRACTITIONER

## 2022-08-22 RX ORDER — PROPRANOLOL HCL 60 MG
60 CAPSULE, EXTENDED RELEASE 24HR ORAL EVERY EVENING
Qty: 30 CAPSULE | Refills: 11 | Status: SHIPPED | OUTPATIENT
Start: 2022-08-22 | End: 2022-10-03

## 2022-08-22 RX ORDER — LOSARTAN POTASSIUM 50 MG/1
50 TABLET ORAL 2 TIMES DAILY
Qty: 180 TABLET | Refills: 3 | Status: SHIPPED | OUTPATIENT
Start: 2022-08-22 | End: 2023-10-23 | Stop reason: SDUPTHER

## 2022-08-22 ASSESSMENT — ENCOUNTER SYMPTOMS
MYALGIAS: 0
ORTHOPNEA: 0
PALPITATIONS: 1
HEADACHES: 0
DIZZINESS: 0
ABDOMINAL PAIN: 0
SHORTNESS OF BREATH: 1
CLAUDICATION: 0
FEVER: 0
PND: 0
COUGH: 0

## 2022-08-22 ASSESSMENT — FIBROSIS 4 INDEX: FIB4 SCORE: 0.54

## 2022-08-22 NOTE — PATIENT INSTRUCTIONS
Reach out in 2 weeks with start of new medication for tachycardia (fast HR's).    The new medication is called propanolol 60 mg once in the evening.    I would like you to see our electrophysiologist in the office (heart rhythm specialist) to have a second opinion on your heart monitor results and symptoms/medications.    Keep track of your daily BP and HR with this new medication change.

## 2022-08-22 NOTE — PROGRESS NOTES
Chief Complaint   Patient presents with    Other     F/V Dx: Class 1 obesity due to excess calories without serious comorbidity with body mass index (BMI) of 33.0 to 33.9 in adult     Subjective     Melina Mcmahan is a 31 y.o. female who presents today for palpitations with ST at rest.    Hx of HTN, inappropriate ST, Graves disease (managed by Dr. Souza).    She has had two normal pregnancies with notable preeclampsia (first pregnancy only) as well as labile BP since her pregnancies related to Grave's disease.     She was taken off her thyroid medication and then had a hypertensive episode with SBP in the 160's with L eye visual changes. She saw her PCP who did an EKG and urged for medication changes. She didn't tolerate the higher dose of metoprolol. Her EKG's continue with inappropriate ST in the 110-120's at rest.    She is here with her step-daughter today.    She was making lots of dietary changes and was walking 30 minutes a day and is hoping to get back to this.    She does admit to some anxiety related to her diagnoses and will talk to her PCP about this.    She has shortness of breath on exertion with her palpitations.    She works as a  for [employer] and works 16 hour shifts for 3 days straight.    She drinks one cup of coffee daily but no alcohol, cigarettes, or drug use.    She is a former smoker but quit when she was diagnosed with HTN.    Past Medical History:   Diagnosis Date    Female cystocele 2/13/2015    Graves disease 11/11/2020    Hypertension 6410-4023    put on bedrest at 4mths    Thyrotoxicosis 11/1/2020     Past Surgical History:   Procedure Laterality Date    REPEAT C SECTION W TUBAL LIGATION  2/20/2014    Performed by Barbara Velasco M.D. at [facility]    DILATION AND CURETTAGE  2013    D & C    PRIMARY C SECTION  2006    position of baby, face first     OTHER ABDOMINAL SURGERY      c section     Family History   Problem Relation Age of Onset    Cancer  Mother         unknown    Diabetes Father         diet and pills    Hypertension Father     Diabetes Sister         eldest sister, controlled with diet and pills    Other Paternal Grandmother         kidney transplant     Social History     Socioeconomic History    Marital status:      Spouse name: Not on file    Number of children: Not on file    Years of education: Not on file    Highest education level: Not on file   Occupational History    Not on file   Tobacco Use    Smoking status: Former     Packs/day: 0.25     Years: 3.00     Pack years: 0.75     Types: Cigarettes     Quit date: 3/19/2013     Years since quittin.4    Smokeless tobacco: Never    Tobacco comments:     1 cig dialy    Vaping Use    Vaping Use: Never used   Substance and Sexual Activity    Alcohol use: No    Drug use: No    Sexual activity: Yes     Partners: Male     Birth control/protection: Condom, Pill     Comment: FOB involved and supportive.   Other Topics Concern    Not on file   Social History Narrative    Not on file     Social Determinants of Health     Financial Resource Strain: Not on file   Food Insecurity: Not on file   Transportation Needs: Not on file   Physical Activity: Not on file   Stress: Not on file   Social Connections: Not on file   Intimate Partner Violence: Not on file   Housing Stability: Not on file     Allergies   Allergen Reactions    Nkda [No Known Drug Allergy]      Outpatient Encounter Medications as of 2022   Medication Sig Dispense Refill    propranolol LA (INDERAL LA) 60 MG CAPSULE SR 24 HR Take 1 Capsule by mouth every evening. 30 Capsule 11    losartan (COZAAR) 50 MG Tab Take 1 Tablet by mouth 2 times a day. 180 Tablet 3    methimazole (TAPAZOLE) 5 MG Tab Take 2.5 mg by mouth every day.      therapeutic multivitamin-minerals (THERAGRAN-M) Tab Take 1 Tablet by mouth every day.      [DISCONTINUED] metoprolol SR (TOPROL XL) 25 MG TABLET SR 24 HR Take 0.5 Tablets by mouth every evening. 45 Tablet 3  "   [DISCONTINUED] losartan (COZAAR) 50 MG Tab Take 1 Tablet by mouth 2 times a day. 180 Tablet 3     No facility-administered encounter medications on file as of 8/22/2022.     Review of Systems   Constitutional:  Negative for fever and malaise/fatigue.   Eyes:         L eye blurry when BP elevated   Respiratory:  Positive for shortness of breath. Negative for cough.         With palpitations     Cardiovascular:  Positive for palpitations. Negative for chest pain, orthopnea, claudication, leg swelling and PND.        When thyroid not being controlled   Gastrointestinal:  Negative for abdominal pain.   Musculoskeletal:  Negative for myalgias.   Neurological:  Negative for dizziness and headaches.                      Objective     BP (!) 148/78 (BP Location: Left arm, Patient Position: Sitting, BP Cuff Size: Adult)   Pulse (!) 117   Resp 14   Ht 1.626 m (5' 4\")   Wt 91.2 kg (201 lb)   SpO2 95%   BMI 34.50 kg/m²     Physical Exam  Vitals and nursing note reviewed.   Constitutional:       Appearance: Normal appearance. She is well-developed. She is obese.   HENT:      Head: Normocephalic and atraumatic.   Neck:      Vascular: No JVD.   Cardiovascular:      Rate and Rhythm: Normal rate and regular rhythm.      Pulses: Normal pulses.      Heart sounds: Normal heart sounds.   Pulmonary:      Effort: Pulmonary effort is normal.      Breath sounds: Normal breath sounds.   Musculoskeletal:         General: Normal range of motion.   Skin:     General: Skin is warm and dry.      Capillary Refill: Capillary refill takes less than 2 seconds.   Neurological:      General: No focal deficit present.      Mental Status: She is alert and oriented to person, place, and time. Mental status is at baseline.   Psychiatric:         Mood and Affect: Mood normal.         Behavior: Behavior normal.         Thought Content: Thought content normal.         Judgment: Judgment normal.              Assessment & Plan     1. Tachycardia  EKG "    REFERRAL TO CARDIOLOGY      2. Palpitations        3. Hypertension, unspecified type        4. Class 1 obesity due to excess calories without serious comorbidity with body mass index (BMI) of 33.0 to 33.9 in adult            Medical Decision Making: Today's Assessment/Status/Plan:      1. HTN  -cont losartan 50 mg BID and switch to alternative bb-propanolol 60 mg QD  -cont with diet and exercise changes  -call for elevated readings or concerns prior to next apt  -echo in '20 that showed normal function, no structural concerns or LVH  -recent renal US in '20 showed no renal artery stenosis  -family hx of HTN and medical hx of pre-eclampsia and recent dx of graves disease     2. Inappropriate ST v. PSVT at rest  -remains uncontrolled on low dose bb therapy  -recommend transition to propanolol 60 mg QD  -order echo for structural review  -follow up with EP as planned  -follow up with     Patient is to follow up with EP in 2 months for second opinion and management of ST/PSVT.

## 2022-10-03 ENCOUNTER — HOSPITAL ENCOUNTER (OUTPATIENT)
Dept: LAB | Facility: MEDICAL CENTER | Age: 32
End: 2022-10-03
Attending: INTERNAL MEDICINE
Payer: COMMERCIAL

## 2022-10-03 ENCOUNTER — OFFICE VISIT (OUTPATIENT)
Dept: CARDIOLOGY | Facility: MEDICAL CENTER | Age: 32
End: 2022-10-03
Attending: NURSE PRACTITIONER
Payer: COMMERCIAL

## 2022-10-03 VITALS
RESPIRATION RATE: 14 BRPM | DIASTOLIC BLOOD PRESSURE: 92 MMHG | OXYGEN SATURATION: 97 % | HEART RATE: 120 BPM | BODY MASS INDEX: 34.15 KG/M2 | WEIGHT: 200 LBS | HEIGHT: 64 IN | SYSTOLIC BLOOD PRESSURE: 142 MMHG

## 2022-10-03 DIAGNOSIS — R00.0 TACHYCARDIA: ICD-10-CM

## 2022-10-03 LAB
T4 FREE SERPL-MCNC: 1.17 NG/DL (ref 0.93–1.7)
TSH SERPL DL<=0.005 MIU/L-ACNC: 2.12 UIU/ML (ref 0.38–5.33)

## 2022-10-03 PROCEDURE — 36415 COLL VENOUS BLD VENIPUNCTURE: CPT

## 2022-10-03 PROCEDURE — 93000 ELECTROCARDIOGRAM COMPLETE: CPT | Performed by: INTERNAL MEDICINE

## 2022-10-03 PROCEDURE — 84443 ASSAY THYROID STIM HORMONE: CPT

## 2022-10-03 PROCEDURE — 99213 OFFICE O/P EST LOW 20 MIN: CPT | Mod: 25 | Performed by: INTERNAL MEDICINE

## 2022-10-03 PROCEDURE — 84439 ASSAY OF FREE THYROXINE: CPT

## 2022-10-03 RX ORDER — PROPRANOLOL HYDROCHLORIDE 10 MG/1
10 TABLET ORAL 3 TIMES DAILY PRN
Qty: 90 TABLET | Refills: 3 | Status: SHIPPED | OUTPATIENT
Start: 2022-10-03 | End: 2023-08-16

## 2022-10-03 ASSESSMENT — FIBROSIS 4 INDEX: FIB4 SCORE: 0.54

## 2022-10-04 NOTE — PROGRESS NOTES
Arrhythmia Clinic Note (New patient)     DOS: 10/3/2022    Referring physician: Lia Rawls    Chief complaint/Reason for consult: Tachycardia    HPI: 33 y/o F with sinus tachycardia. Trialed LA propranolol but had fatigue. Referred to EP. No chest pain. No syncope. Prior normal echo. C/o SOB intermittent.    ROS (+ highlighted in bold):  Constitutional: Fevers/chills/fatigue/weightloss  HEENT: Blurry vision/eye pain/sore throat/hearing loss  Respiratory: Shortness of breath/cough  Cardiovascular: Chest pain/palpitations/edema/orthopnea/syncope  GI: Nausea/vomitting/diarrhea  MSK: Arthralgias/myagias/muscle weakness  Skin: Rash/sores  Neurological: Numbness/tremors/vertigo  Endocrine: Excessive thirst/polyuria/cold intolerance/heat intolerance  Psych: Depression/anxiety    Past Medical History:   Diagnosis Date    Female cystocele 2015    Graves disease 2020    Hypertension 5587-9796    put on bedrest at 4mths    Thyrotoxicosis 2020       Past Surgical History:   Procedure Laterality Date    REPEAT C SECTION W TUBAL LIGATION  2014    Performed by Barbara Velasco M.D. at [facility]    DILATION AND CURETTAGE      D & C    PRIMARY C SECTION  2006    position of baby, face first     OTHER ABDOMINAL SURGERY      c section       Social History     Socioeconomic History    Marital status:      Spouse name: Not on file    Number of children: Not on file    Years of education: Not on file    Highest education level: Not on file   Occupational History    Not on file   Tobacco Use    Smoking status: Former     Packs/day: 0.25     Years: 3.00     Pack years: 0.75     Types: Cigarettes     Quit date: 3/19/2013     Years since quittin.5    Smokeless tobacco: Never    Tobacco comments:     1 cig dialy    Vaping Use    Vaping Use: Never used   Substance and Sexual Activity    Alcohol use: No    Drug use: No    Sexual activity: Yes     Partners: Male     Birth  "control/protection: Condom, Pill     Comment: FOB involved and supportive.   Other Topics Concern    Not on file   Social History Narrative    Not on file     Social Determinants of Health     Financial Resource Strain: Not on file   Food Insecurity: Not on file   Transportation Needs: Not on file   Physical Activity: Not on file   Stress: Not on file   Social Connections: Not on file   Intimate Partner Violence: Not on file   Housing Stability: Not on file       Family History   Problem Relation Age of Onset    Cancer Mother         unknown    Diabetes Father         diet and pills    Hypertension Father     Diabetes Sister         eldest sister, controlled with diet and pills    Other Paternal Grandmother         kidney transplant       Allergies   Allergen Reactions    Nkda [No Known Drug Allergy]        Current Outpatient Medications   Medication Sig Dispense Refill    propranolol (INDERAL) 10 MG Tab Take 1 Tablet by mouth 3 times a day as needed (tachycardia). 90 Tablet 3    losartan (COZAAR) 50 MG Tab Take 1 Tablet by mouth 2 times a day. 180 Tablet 3    methimazole (TAPAZOLE) 5 MG Tab Take 2.5 mg by mouth every day.      therapeutic multivitamin-minerals (THERAGRAN-M) Tab Take 1 Tablet by mouth every day.       No current facility-administered medications for this visit.       Physical Exam:  Vitals:    10/03/22 1551   BP: (!) 142/92   BP Location: Left arm   Patient Position: Sitting   BP Cuff Size: Adult   Pulse: (!) 120   Resp: 14   SpO2: 97%   Weight: 90.7 kg (200 lb)   Height: 1.626 m (5' 4\")     General appearance: NAD, conversant   Eyes: anicteric sclerae, moist conjunctivae; no lid-lag; PERRLA  HENT: Atraumatic; oropharynx clear with moist mucous membranes and no mucosal ulcerations; normal hard and soft palate  Neck: Trachea midline; FROM, supple, no thyromegaly or lymphadenopathy  Lungs: CTA, with normal respiratory effort and no intercostal retractions  CV: RRR, no MRGs, no JVD   Abdomen: Soft, " non-tender; no masses or HSM  Extremities: No peripheral edema or extremity lymphadenopathy  Skin: Normal temperature, turgor and texture; no rash, ulcers or subcutaneous nodules  Psych: Appropriate affect, alert and oriented to person, place and time    Data:  Lipids:   Lab Results   Component Value Date/Time    CHOLSTRLTOT 194 05/24/2022 10:02 AM    TRIGLYCERIDE 136 05/24/2022 10:02 AM    HDL 44 05/24/2022 10:02 AM     (H) 05/24/2022 10:02 AM        BMP:  Lab Results   Component Value Date/Time    SODIUM 136 07/26/2022 1508    POTASSIUM 3.4 (L) 07/26/2022 1508    CHLORIDE 104 07/26/2022 1508    CO2 21 07/26/2022 1508    GLUCOSE 156 (H) 07/26/2022 1508    BUN 9 07/26/2022 1508    CREATININE 0.72 07/26/2022 1508    CALCIUM 8.7 07/26/2022 1508    ANION 11.0 07/26/2022 1508        TSH:   Lab Results   Component Value Date/Time    TSHULTRASEN 2.120 10/03/2022 0811        THYROXINE (T4):   No results found for: AUSTINIR     CBC:   Lab Results   Component Value Date/Time    WBC 6.9 07/26/2022 03:08 PM    RBC 4.49 07/26/2022 03:08 PM    HEMOGLOBIN 11.7 (L) 07/26/2022 03:08 PM    HEMATOCRIT 36.4 (L) 07/26/2022 03:08 PM    MCV 81.1 (L) 07/26/2022 03:08 PM    MCH 26.1 (L) 07/26/2022 03:08 PM    MCHC 32.1 (L) 07/26/2022 03:08 PM    RDW 45.0 07/26/2022 03:08 PM    PLATELETCT 301 07/26/2022 03:08 PM    MPV 12.1 07/26/2022 03:08 PM    NEUTSPOLYS 54.20 07/26/2022 03:08 PM    LYMPHOCYTES 36.40 07/26/2022 03:08 PM    MONOCYTES 7.00 07/26/2022 03:08 PM    EOSINOPHILS 1.60 07/26/2022 03:08 PM    BASOPHILS 0.40 07/26/2022 03:08 PM    IMMGRAN 0.40 07/26/2022 03:08 PM    NRBC 0.00 07/26/2022 03:08 PM    NEUTS 3.71 07/26/2022 03:08 PM    LYMPHS 2.50 07/26/2022 03:08 PM    MONOS 0.48 07/26/2022 03:08 PM    EOS 0.11 07/26/2022 03:08 PM    BASO 0.03 07/26/2022 03:08 PM    IMMGRANAB 0.03 07/26/2022 03:08 PM    NRBCAB 0.00 07/26/2022 03:08 PM        CBC w/o DIFF  Lab Results   Component Value Date/Time    WBC 6.9 07/26/2022 03:08 PM     RBC 4.49 07/26/2022 03:08 PM    HEMOGLOBIN 11.7 (L) 07/26/2022 03:08 PM    MCV 81.1 (L) 07/26/2022 03:08 PM    MCH 26.1 (L) 07/26/2022 03:08 PM    MCHC 32.1 (L) 07/26/2022 03:08 PM    RDW 45.0 07/26/2022 03:08 PM    MPV 12.1 07/26/2022 03:08 PM       Prior echo/stress results reviewed: Normal EF    EKG interpreted by me: Sinus tach    Zio interpreted by me: Sinus tach    Impression/Plan:  1. Tachycardia  EKG        Sinus tachycardia    - Discussed options including no Rx, BB, CCB, Ivabradine, or referral to [place] for possible SN ablation  - She will try short acting low dose propranolol as needed    FU with JOSE F PRCLIFTON Burnham MD  Cardiac Electrophysiology

## 2022-10-25 LAB — EKG IMPRESSION: NORMAL

## 2023-08-15 SDOH — ECONOMIC STABILITY: FOOD INSECURITY: WITHIN THE PAST 12 MONTHS, YOU WORRIED THAT YOUR FOOD WOULD RUN OUT BEFORE YOU GOT MONEY TO BUY MORE.: NEVER TRUE

## 2023-08-15 SDOH — ECONOMIC STABILITY: FOOD INSECURITY: WITHIN THE PAST 12 MONTHS, THE FOOD YOU BOUGHT JUST DIDN'T LAST AND YOU DIDN'T HAVE MONEY TO GET MORE.: NEVER TRUE

## 2023-08-15 SDOH — ECONOMIC STABILITY: TRANSPORTATION INSECURITY
IN THE PAST 12 MONTHS, HAS LACK OF TRANSPORTATION KEPT YOU FROM MEETINGS, WORK, OR FROM GETTING THINGS NEEDED FOR DAILY LIVING?: NO

## 2023-08-15 SDOH — HEALTH STABILITY: PHYSICAL HEALTH: ON AVERAGE, HOW MANY MINUTES DO YOU ENGAGE IN EXERCISE AT THIS LEVEL?: 60 MIN

## 2023-08-15 SDOH — HEALTH STABILITY: PHYSICAL HEALTH: ON AVERAGE, HOW MANY DAYS PER WEEK DO YOU ENGAGE IN MODERATE TO STRENUOUS EXERCISE (LIKE A BRISK WALK)?: 3 DAYS

## 2023-08-15 SDOH — ECONOMIC STABILITY: HOUSING INSECURITY
IN THE LAST 12 MONTHS, WAS THERE A TIME WHEN YOU DID NOT HAVE A STEADY PLACE TO SLEEP OR SLEPT IN A SHELTER (INCLUDING NOW)?: NO

## 2023-08-15 SDOH — ECONOMIC STABILITY: INCOME INSECURITY: HOW HARD IS IT FOR YOU TO PAY FOR THE VERY BASICS LIKE FOOD, HOUSING, MEDICAL CARE, AND HEATING?: NOT HARD AT ALL

## 2023-08-15 SDOH — ECONOMIC STABILITY: TRANSPORTATION INSECURITY
IN THE PAST 12 MONTHS, HAS THE LACK OF TRANSPORTATION KEPT YOU FROM MEDICAL APPOINTMENTS OR FROM GETTING MEDICATIONS?: NO

## 2023-08-15 SDOH — ECONOMIC STABILITY: INCOME INSECURITY: IN THE LAST 12 MONTHS, WAS THERE A TIME WHEN YOU WERE NOT ABLE TO PAY THE MORTGAGE OR RENT ON TIME?: NO

## 2023-08-15 SDOH — ECONOMIC STABILITY: HOUSING INSECURITY: IN THE LAST 12 MONTHS, HOW MANY PLACES HAVE YOU LIVED?: 1

## 2023-08-15 SDOH — ECONOMIC STABILITY: TRANSPORTATION INSECURITY
IN THE PAST 12 MONTHS, HAS LACK OF RELIABLE TRANSPORTATION KEPT YOU FROM MEDICAL APPOINTMENTS, MEETINGS, WORK OR FROM GETTING THINGS NEEDED FOR DAILY LIVING?: NO

## 2023-08-15 SDOH — HEALTH STABILITY: MENTAL HEALTH
STRESS IS WHEN SOMEONE FEELS TENSE, NERVOUS, ANXIOUS, OR CAN'T SLEEP AT NIGHT BECAUSE THEIR MIND IS TROUBLED. HOW STRESSED ARE YOU?: ONLY A LITTLE

## 2023-08-15 ASSESSMENT — SOCIAL DETERMINANTS OF HEALTH (SDOH)
HOW OFTEN DO YOU GET TOGETHER WITH FRIENDS OR RELATIVES?: ONCE A WEEK
WITHIN THE PAST 12 MONTHS, YOU WORRIED THAT YOUR FOOD WOULD RUN OUT BEFORE YOU GOT THE MONEY TO BUY MORE: NEVER TRUE
HOW OFTEN DO YOU ATTENT MEETINGS OF THE CLUB OR ORGANIZATION YOU BELONG TO?: NEVER
HOW OFTEN DO YOU HAVE A DRINK CONTAINING ALCOHOL: NEVER
IN A TYPICAL WEEK, HOW MANY TIMES DO YOU TALK ON THE PHONE WITH FAMILY, FRIENDS, OR NEIGHBORS?: MORE THAN THREE TIMES A WEEK
HOW OFTEN DO YOU ATTEND CHURCH OR RELIGIOUS SERVICES?: NEVER
HOW HARD IS IT FOR YOU TO PAY FOR THE VERY BASICS LIKE FOOD, HOUSING, MEDICAL CARE, AND HEATING?: NOT HARD AT ALL
HOW OFTEN DO YOU ATTENT MEETINGS OF THE CLUB OR ORGANIZATION YOU BELONG TO?: NEVER
HOW OFTEN DO YOU HAVE SIX OR MORE DRINKS ON ONE OCCASION: NEVER
IN A TYPICAL WEEK, HOW MANY TIMES DO YOU TALK ON THE PHONE WITH FAMILY, FRIENDS, OR NEIGHBORS?: MORE THAN THREE TIMES A WEEK
HOW OFTEN DO YOU ATTEND CHURCH OR RELIGIOUS SERVICES?: NEVER
DO YOU BELONG TO ANY CLUBS OR ORGANIZATIONS SUCH AS CHURCH GROUPS UNIONS, FRATERNAL OR ATHLETIC GROUPS, OR SCHOOL GROUPS?: NO
HOW OFTEN DO YOU GET TOGETHER WITH FRIENDS OR RELATIVES?: ONCE A WEEK
DO YOU BELONG TO ANY CLUBS OR ORGANIZATIONS SUCH AS CHURCH GROUPS UNIONS, FRATERNAL OR ATHLETIC GROUPS, OR SCHOOL GROUPS?: NO
HOW MANY DRINKS CONTAINING ALCOHOL DO YOU HAVE ON A TYPICAL DAY WHEN YOU ARE DRINKING: PATIENT DOES NOT DRINK

## 2023-08-15 ASSESSMENT — LIFESTYLE VARIABLES
SKIP TO QUESTIONS 9-10: 1
HOW MANY STANDARD DRINKS CONTAINING ALCOHOL DO YOU HAVE ON A TYPICAL DAY: PATIENT DOES NOT DRINK
HOW OFTEN DO YOU HAVE SIX OR MORE DRINKS ON ONE OCCASION: NEVER
AUDIT-C TOTAL SCORE: 0
HOW OFTEN DO YOU HAVE A DRINK CONTAINING ALCOHOL: NEVER

## 2023-08-16 ENCOUNTER — OFFICE VISIT (OUTPATIENT)
Dept: MEDICAL GROUP | Facility: LAB | Age: 33
End: 2023-08-16
Payer: COMMERCIAL

## 2023-08-16 VITALS
WEIGHT: 201.72 LBS | HEIGHT: 64 IN | SYSTOLIC BLOOD PRESSURE: 158 MMHG | RESPIRATION RATE: 16 BRPM | DIASTOLIC BLOOD PRESSURE: 88 MMHG | HEART RATE: 108 BPM | TEMPERATURE: 98.8 F | BODY MASS INDEX: 34.44 KG/M2 | OXYGEN SATURATION: 98 %

## 2023-08-16 DIAGNOSIS — Z83.3 FAMILY HISTORY OF DIABETES MELLITUS: ICD-10-CM

## 2023-08-16 DIAGNOSIS — I10 HYPERTENSION, UNSPECIFIED TYPE: ICD-10-CM

## 2023-08-16 DIAGNOSIS — E66.09 CLASS 1 OBESITY DUE TO EXCESS CALORIES WITHOUT SERIOUS COMORBIDITY WITH BODY MASS INDEX (BMI) OF 33.0 TO 33.9 IN ADULT: ICD-10-CM

## 2023-08-16 DIAGNOSIS — E05.00 GRAVES DISEASE: ICD-10-CM

## 2023-08-16 DIAGNOSIS — Z00.00 HEALTHCARE MAINTENANCE: ICD-10-CM

## 2023-08-16 PROBLEM — E05.90 THYROTOXICOSIS: Status: RESOLVED | Noted: 2020-11-01 | Resolved: 2023-08-16

## 2023-08-16 PROCEDURE — 3077F SYST BP >= 140 MM HG: CPT | Performed by: STUDENT IN AN ORGANIZED HEALTH CARE EDUCATION/TRAINING PROGRAM

## 2023-08-16 PROCEDURE — 99204 OFFICE O/P NEW MOD 45 MIN: CPT | Performed by: STUDENT IN AN ORGANIZED HEALTH CARE EDUCATION/TRAINING PROGRAM

## 2023-08-16 PROCEDURE — 3079F DIAST BP 80-89 MM HG: CPT | Performed by: STUDENT IN AN ORGANIZED HEALTH CARE EDUCATION/TRAINING PROGRAM

## 2023-08-16 RX ORDER — AMLODIPINE BESYLATE 5 MG/1
5 TABLET ORAL DAILY
Qty: 60 TABLET | Refills: 0 | Status: SHIPPED | OUTPATIENT
Start: 2023-08-16 | End: 2023-09-20 | Stop reason: SDUPTHER

## 2023-08-16 ASSESSMENT — PATIENT HEALTH QUESTIONNAIRE - PHQ9: CLINICAL INTERPRETATION OF PHQ2 SCORE: 0

## 2023-08-16 ASSESSMENT — FIBROSIS 4 INDEX: FIB4 SCORE: 0.56

## 2023-08-16 NOTE — LETTER
[email or web address] [facility]  Dennis Pride D.O.  [address] [facility] [address]  [place] [address]  Fax: [phone]   Authorization for Release/Disclosure of   Protected Health Information   Name: MADDY MCMAHAN : 1990 SSN: [id number]   Address: [address] [place] [address]  [place] [address] Phone:    [phone] (home)    I authorize the entity listed below to release/disclose the PHI below to:   [facility], [facility]/Dennis Pride D.O. and Dennis Pride D.O.   Provider or Entity Name:  [facility]    Address   City, State, Zip   Phone:      Fax:     Reason for request: continuity of care   Information to be released:    [  ] LAST COLONOSCOPY,  including any PATH REPORT and follow-up  [  ] LAST FIT/COLOGUARD RESULT [  ] LAST DEXA  [  ] LAST MAMMOGRAM  [  ] LAST PAP  [  ] LAST LABS [  ] RETINA EXAM REPORT  [  ] IMMUNIZATION RECORDS  [  XX] Release all info recent ER visit      [  ] Check here and initial the line next to each item to release ALL health information INCLUDING  _____ Care and treatment for drug and / or alcohol abuse  _____ HIV testing, infection status, or AIDS  _____ Genetic Testing    DATES OF SERVICE OR TIME PERIOD TO BE DISCLOSED: _____________  I understand and acknowledge that:  * This Authorization may be revoked at any time by you in writing, except if your health information has already been used or disclosed.  * Your health information that will be used or disclosed as a result of you signing this authorization could be re-disclosed by the recipient. If this occurs, your re-disclosed health information may no longer be protected by State or Federal laws.  * You may refuse to sign this Authorization. Your refusal will not affect your ability to obtain treatment.  * This Authorization becomes effective upon signing and will  on (date) __________.      If no date is indicated, this Authorization will  one (1) year from the signature date.    Name: Maddy Mcmahan  Signature: Date:    8/16/2023     PLEASE FAX REQUESTED RECORDS BACK TO: [phone]

## 2023-08-16 NOTE — ASSESSMENT & PLAN NOTE
Chronic-not controlled  -continue losartan 50mg BID  -start amlodipine 5mg   -patient will track her BP

## 2023-08-16 NOTE — PROGRESS NOTES
"Subjective:     CC: establishing with new provider    HPI:   Melina presents today for the following;    Problem   Family History of Diabetes Mellitus    Dad and sister     Class 1 Obesity Due to Excess Calories Without Serious Comorbidity With Body Mass Index (Bmi) of 33.0 to 33.9 in Adult    Exercises 3x/week     Graves Disease    -Dx in 2020  -was on methimazole 5mg      Hypertension    -Elevated despite controlled graves   -she was on metoprolol in the past      Thyrotoxicosis (Resolved)       Current Outpatient Medications Ordered in Epic   Medication Sig Dispense Refill    amLODIPine (NORVASC) 5 MG Tab Take 1 Tablet by mouth every day. 60 Tablet 0    losartan (COZAAR) 50 MG Tab Take 1 Tablet by mouth 2 times a day. 180 Tablet 3    methimazole (TAPAZOLE) 5 MG Tab Take 2.5 mg by mouth every day. (Patient not taking: Reported on 8/16/2023)      therapeutic multivitamin-minerals (THERAGRAN-M) Tab Take 1 Tablet by mouth every day.       No current [facility]-ordered facility-administered medications on file.           ROS:  ROS    Objective:     Exam:  BP (!) 158/88 (BP Location: Right arm, Patient Position: Sitting, BP Cuff Size: Adult long)   Pulse (!) 108   Temp 37.1 °C (98.8 °F)   Resp 16   Ht 1.626 m (5' 4\")   Wt 91.5 kg (201 lb 11.5 oz)   SpO2 98%   BMI 34.63 kg/m²  Body mass index is 34.63 kg/m².    Physical Exam          Assessment & Plan:     Problem List Items Addressed This Visit       Class 1 obesity due to excess calories without serious comorbidity with body mass index (BMI) of 33.0 to 33.9 in adult     Chronic  -will work on blood pressure and thyroid condition first and then focus on weight          Family history of diabetes mellitus    Graves disease     Chronic  -refer to endocrinology         Relevant Orders    Referral to Endocrinology    Hypertension     Chronic-not controlled  -continue losartan 50mg BID  -start amlodipine 5mg   -patient will track her BP         Relevant Medications    " amLODIPine (NORVASC) 5 MG Tab     Other Visit Diagnoses       Healthcare maintenance        Relevant Orders    TSH+FREE T4    HEMOGLOBIN A1C    Lipid Profile          1 month follow up    Please note that this dictation was created using voice recognition software. I have made every reasonable attempt to correct obvious errors, but I expect that there are errors of grammar and possibly content that I did not discover before finalizing the note.

## 2023-08-18 ENCOUNTER — TELEPHONE (OUTPATIENT)
Dept: MEDICAL GROUP | Facility: LAB | Age: 33
End: 2023-08-18

## 2023-08-18 ENCOUNTER — HOSPITAL ENCOUNTER (OUTPATIENT)
Dept: LAB | Facility: MEDICAL CENTER | Age: 33
End: 2023-08-18
Attending: STUDENT IN AN ORGANIZED HEALTH CARE EDUCATION/TRAINING PROGRAM
Payer: COMMERCIAL

## 2023-08-18 DIAGNOSIS — Z00.00 HEALTHCARE MAINTENANCE: ICD-10-CM

## 2023-08-18 LAB
CHOLEST SERPL-MCNC: 219 MG/DL (ref 100–199)
EST. AVERAGE GLUCOSE BLD GHB EST-MCNC: 120 MG/DL
FASTING STATUS PATIENT QL REPORTED: NORMAL
HBA1C MFR BLD: 5.8 % (ref 4–5.6)
HDLC SERPL-MCNC: 51 MG/DL
LDLC SERPL CALC-MCNC: 121 MG/DL
T4 FREE SERPL-MCNC: 1.2 NG/DL (ref 0.93–1.7)
TRIGL SERPL-MCNC: 234 MG/DL (ref 0–149)
TSH SERPL DL<=0.005 MIU/L-ACNC: 2.73 UIU/ML (ref 0.38–5.33)

## 2023-08-18 PROCEDURE — 80061 LIPID PANEL: CPT

## 2023-08-18 PROCEDURE — 83036 HEMOGLOBIN GLYCOSYLATED A1C: CPT

## 2023-08-18 PROCEDURE — 84439 ASSAY OF FREE THYROXINE: CPT

## 2023-08-18 PROCEDURE — 36415 COLL VENOUS BLD VENIPUNCTURE: CPT

## 2023-08-18 PROCEDURE — 84443 ASSAY THYROID STIM HORMONE: CPT

## 2023-08-18 NOTE — TELEPHONE ENCOUNTER
1. Caller Name: Melina Mcmahan                        Call Back Number: [phone] (home)         How would the patient prefer to be contacted with a response: Phone call do NOT leave a detailed message    2. Patient is requesting lab results dated: 08/18/2023      3. Confirmed results are in chart. Patient advised they will be contacted once interpreted by provider.    Patient called and LVM concerned about her lipid profile results, Please advise while PCP is OOO

## 2023-09-20 ENCOUNTER — OFFICE VISIT (OUTPATIENT)
Dept: MEDICAL GROUP | Facility: LAB | Age: 33
End: 2023-09-20
Payer: COMMERCIAL

## 2023-09-20 VITALS
DIASTOLIC BLOOD PRESSURE: 72 MMHG | OXYGEN SATURATION: 98 % | SYSTOLIC BLOOD PRESSURE: 136 MMHG | BODY MASS INDEX: 33.65 KG/M2 | RESPIRATION RATE: 16 BRPM | TEMPERATURE: 98.4 F | WEIGHT: 197.09 LBS | HEART RATE: 88 BPM | HEIGHT: 64 IN

## 2023-09-20 DIAGNOSIS — R73.03 PREDIABETES: ICD-10-CM

## 2023-09-20 DIAGNOSIS — E78.5 DYSLIPIDEMIA: ICD-10-CM

## 2023-09-20 DIAGNOSIS — I10 HYPERTENSION, UNSPECIFIED TYPE: ICD-10-CM

## 2023-09-20 PROCEDURE — 3078F DIAST BP <80 MM HG: CPT | Performed by: STUDENT IN AN ORGANIZED HEALTH CARE EDUCATION/TRAINING PROGRAM

## 2023-09-20 PROCEDURE — 99214 OFFICE O/P EST MOD 30 MIN: CPT | Performed by: STUDENT IN AN ORGANIZED HEALTH CARE EDUCATION/TRAINING PROGRAM

## 2023-09-20 PROCEDURE — 3075F SYST BP GE 130 - 139MM HG: CPT | Performed by: STUDENT IN AN ORGANIZED HEALTH CARE EDUCATION/TRAINING PROGRAM

## 2023-09-20 RX ORDER — AMLODIPINE BESYLATE 5 MG/1
5 TABLET ORAL 2 TIMES DAILY
Qty: 180 TABLET | Refills: 3 | Status: SHIPPED | OUTPATIENT
Start: 2023-09-20

## 2023-09-20 ASSESSMENT — ENCOUNTER SYMPTOMS
CHILLS: 0
SHORTNESS OF BREATH: 0
FEVER: 0

## 2023-09-20 ASSESSMENT — FIBROSIS 4 INDEX: FIB4 SCORE: 0.56

## 2023-09-20 NOTE — PROGRESS NOTES
"Subjective:     CC: bp follow up     HPI:   Melina presents today for the following;    Problem   Hypertension    -Elevated despite controlled graves   -she was on metoprolol in the past    9/20/23   -patient is on losartan 50 BID and amlodipine 5mg   -her BP at home in the 130s/70's         Current Outpatient Medications Ordered in Epic   Medication Sig Dispense Refill    amLODIPine (NORVASC) 5 MG Tab Take 1 Tablet by mouth 2 times a day. 180 Tablet 3    losartan (COZAAR) 50 MG Tab Take 1 Tablet by mouth 2 times a day. 180 Tablet 3    therapeutic multivitamin-minerals (THERAGRAN-M) Tab Take 1 Tablet by mouth every day.       No current [facility]-ordered facility-administered medications on file.           ROS:  Review of Systems   Constitutional:  Negative for chills and fever.   Respiratory:  Negative for shortness of breath.    Cardiovascular:  Negative for chest pain.       Objective:     Exam:  /72 (BP Location: Right arm, Patient Position: Sitting, BP Cuff Size: Adult)   Pulse 88   Temp 36.9 °C (98.4 °F)   Resp 16   Ht 1.626 m (5' 4\")   Wt 89.4 kg (197 lb 1.5 oz)   SpO2 98%   BMI 33.83 kg/m²  Body mass index is 33.83 kg/m².    Physical Exam  Constitutional:       General: She is not in acute distress.     Appearance: She is not ill-appearing.   Pulmonary:      Effort: Pulmonary effort is normal.   Neurological:      Mental Status: She is alert.   Psychiatric:         Mood and Affect: Mood normal.         Behavior: Behavior normal.         Thought Content: Thought content normal.         Judgment: Judgment normal.               Assessment & Plan:     Problem List Items Addressed This Visit       Hypertension     Chronic-not completely controlled   -continue losartan 50mg BID, increase amlodipine to 5mg BID           Relevant Medications    amLODIPine (NORVASC) 5 MG Tab       3 month annual    Please note that this dictation was created using voice recognition software. I have made every reasonable " attempt to correct obvious errors, but I expect that there are errors of grammar and possibly content that I did not discover before finalizing the note.

## 2023-10-22 DIAGNOSIS — I10 HYPERTENSION, UNSPECIFIED TYPE: ICD-10-CM

## 2023-10-23 RX ORDER — LOSARTAN POTASSIUM 50 MG/1
50 TABLET ORAL 2 TIMES DAILY
Qty: 180 TABLET | Refills: 0 | Status: SHIPPED | OUTPATIENT
Start: 2023-10-23

## 2023-10-23 RX ORDER — LOSARTAN POTASSIUM 50 MG/1
50 TABLET ORAL 2 TIMES DAILY
Qty: 180 TABLET | Refills: 0 | OUTPATIENT
Start: 2023-10-23

## 2023-10-23 NOTE — TELEPHONE ENCOUNTER
Medication refilled for 90 days; [employer] message sent to patient to schedule follow up appointment and complete labs. BMP ordered per protocol.

## 2023-11-03 DIAGNOSIS — E05.00 GRAVES DISEASE: ICD-10-CM

## 2023-11-09 ENCOUNTER — HOSPITAL ENCOUNTER (OUTPATIENT)
Dept: LAB | Facility: MEDICAL CENTER | Age: 33
End: 2023-11-09
Attending: INTERNAL MEDICINE
Payer: COMMERCIAL

## 2023-11-09 LAB
25(OH)D3 SERPL-MCNC: 17 NG/ML (ref 30–100)
T3FREE SERPL-MCNC: 2.97 PG/ML (ref 2–4.4)
T4 FREE SERPL-MCNC: 1.08 NG/DL (ref 0.93–1.7)
TSH SERPL DL<=0.005 MIU/L-ACNC: 1.82 UIU/ML (ref 0.38–5.33)
VIT B12 SERPL-MCNC: 390 PG/ML (ref 211–911)

## 2023-11-09 PROCEDURE — 82306 VITAMIN D 25 HYDROXY: CPT

## 2023-11-09 PROCEDURE — 84443 ASSAY THYROID STIM HORMONE: CPT

## 2023-11-09 PROCEDURE — 82607 VITAMIN B-12: CPT

## 2023-11-09 PROCEDURE — 84439 ASSAY OF FREE THYROXINE: CPT

## 2023-11-09 PROCEDURE — 84481 FREE ASSAY (FT-3): CPT

## 2023-11-09 PROCEDURE — 84445 ASSAY OF TSI GLOBULIN: CPT

## 2023-11-09 PROCEDURE — 36415 COLL VENOUS BLD VENIPUNCTURE: CPT

## 2023-11-10 ENCOUNTER — APPOINTMENT (OUTPATIENT)
Dept: MEDICAL GROUP | Facility: LAB | Age: 33
End: 2023-11-10
Payer: COMMERCIAL

## 2023-11-11 LAB — TSI SER-ACNC: 0.13 IU/L

## 2023-11-15 ENCOUNTER — APPOINTMENT (OUTPATIENT)
Dept: MEDICAL GROUP | Facility: LAB | Age: 33
End: 2023-11-15
Payer: COMMERCIAL

## 2023-12-28 ENCOUNTER — APPOINTMENT (OUTPATIENT)
Dept: MEDICAL GROUP | Facility: LAB | Age: 33
End: 2023-12-28
Payer: COMMERCIAL

## 2024-02-07 ENCOUNTER — HOSPITAL ENCOUNTER (OUTPATIENT)
Dept: LAB | Facility: MEDICAL CENTER | Age: 34
End: 2024-02-07
Attending: INTERNAL MEDICINE
Payer: COMMERCIAL

## 2024-02-07 ENCOUNTER — OFFICE VISIT (OUTPATIENT)
Dept: MEDICAL GROUP | Facility: LAB | Age: 34
End: 2024-02-07
Payer: COMMERCIAL

## 2024-02-07 ENCOUNTER — HOSPITAL ENCOUNTER (OUTPATIENT)
Dept: LAB | Facility: MEDICAL CENTER | Age: 34
End: 2024-02-07
Attending: STUDENT IN AN ORGANIZED HEALTH CARE EDUCATION/TRAINING PROGRAM
Payer: COMMERCIAL

## 2024-02-07 VITALS
BODY MASS INDEX: 34.25 KG/M2 | TEMPERATURE: 97.9 F | RESPIRATION RATE: 16 BRPM | SYSTOLIC BLOOD PRESSURE: 132 MMHG | HEIGHT: 64 IN | DIASTOLIC BLOOD PRESSURE: 80 MMHG | WEIGHT: 200.6 LBS | OXYGEN SATURATION: 98 % | HEART RATE: 88 BPM

## 2024-02-07 DIAGNOSIS — M25.532 PAIN IN BOTH WRISTS: ICD-10-CM

## 2024-02-07 DIAGNOSIS — M25.531 PAIN IN BOTH WRISTS: ICD-10-CM

## 2024-02-07 DIAGNOSIS — R73.03 PREDIABETES: ICD-10-CM

## 2024-02-07 DIAGNOSIS — E78.5 DYSLIPIDEMIA: ICD-10-CM

## 2024-02-07 DIAGNOSIS — E55.9 VITAMIN D DEFICIENCY: ICD-10-CM

## 2024-02-07 DIAGNOSIS — I10 HYPERTENSION, UNSPECIFIED TYPE: ICD-10-CM

## 2024-02-07 DIAGNOSIS — Z23 NEED FOR VACCINATION: ICD-10-CM

## 2024-02-07 DIAGNOSIS — Z01.419 WELL WOMAN EXAM: ICD-10-CM

## 2024-02-07 LAB
EST. AVERAGE GLUCOSE BLD GHB EST-MCNC: 123 MG/DL
HBA1C MFR BLD: 5.9 % (ref 4–5.6)

## 2024-02-07 PROCEDURE — 84439 ASSAY OF FREE THYROXINE: CPT

## 2024-02-07 PROCEDURE — 90715 TDAP VACCINE 7 YRS/> IM: CPT | Performed by: STUDENT IN AN ORGANIZED HEALTH CARE EDUCATION/TRAINING PROGRAM

## 2024-02-07 PROCEDURE — 99395 PREV VISIT EST AGE 18-39: CPT | Mod: 25 | Performed by: STUDENT IN AN ORGANIZED HEALTH CARE EDUCATION/TRAINING PROGRAM

## 2024-02-07 PROCEDURE — 3075F SYST BP GE 130 - 139MM HG: CPT | Performed by: STUDENT IN AN ORGANIZED HEALTH CARE EDUCATION/TRAINING PROGRAM

## 2024-02-07 PROCEDURE — 83036 HEMOGLOBIN GLYCOSYLATED A1C: CPT

## 2024-02-07 PROCEDURE — 3079F DIAST BP 80-89 MM HG: CPT | Performed by: STUDENT IN AN ORGANIZED HEALTH CARE EDUCATION/TRAINING PROGRAM

## 2024-02-07 PROCEDURE — 84443 ASSAY THYROID STIM HORMONE: CPT

## 2024-02-07 PROCEDURE — 90471 IMMUNIZATION ADMIN: CPT | Performed by: STUDENT IN AN ORGANIZED HEALTH CARE EDUCATION/TRAINING PROGRAM

## 2024-02-07 PROCEDURE — 36415 COLL VENOUS BLD VENIPUNCTURE: CPT

## 2024-02-07 PROCEDURE — 80061 LIPID PANEL: CPT

## 2024-02-07 PROCEDURE — 80048 BASIC METABOLIC PNL TOTAL CA: CPT

## 2024-02-07 RX ORDER — MELOXICAM 15 MG/1
15 TABLET ORAL DAILY
Qty: 14 TABLET | Refills: 0 | Status: SHIPPED | OUTPATIENT
Start: 2024-02-07 | End: 2024-02-21

## 2024-02-07 RX ORDER — CHOLECALCIFEROL (VITAMIN D3) 1250 MCG
CAPSULE ORAL
COMMUNITY

## 2024-02-07 ASSESSMENT — FIBROSIS 4 INDEX: FIB4 SCORE: 0.56

## 2024-02-07 ASSESSMENT — ENCOUNTER SYMPTOMS
PALPITATIONS: 0
VOMITING: 0
ABDOMINAL PAIN: 0
FEVER: 0
SPUTUM PRODUCTION: 0
BLOOD IN STOOL: 0
COUGH: 0
CHILLS: 0

## 2024-02-07 ASSESSMENT — PATIENT HEALTH QUESTIONNAIRE - PHQ9: CLINICAL INTERPRETATION OF PHQ2 SCORE: 0

## 2024-02-07 NOTE — PROGRESS NOTES
Subjective:     CC:   Chief Complaint   Patient presents with    Annual Exam       HPI:   Melina Mcmahan is a 33 y.o. female who presents for annual exam    Patient has GYN provider: No   Last Pap Smear: 9 years   H/O Abnormal Pap: No  Last Mammogram: no  Last Bone Density Test: na  Last Colorectal Cancer Screening: na  Last Tdap:   Received HPV series: Aged out    Exercise: she has roughly 120 minutes of exercise/week  Diet: eats mixed diet, recently has been eating more fast food      No LMP recorded.  Hx STDs: No  Birth control: tubal ligation   Menses every month with 6 days with moderate bleeding.  Reports moderate cramping and does take OTC analgesics for cramps.  No significant bloating/fluid retention, pelvic pain, or dyspareunia. No abnormal vaginal discharge.  No breast tenderness, mass, nipple discharge, changes in size or contour, or abnormal cyclic discomfort.    OB History    Para Term  AB Living   6 4 4 0 1 4   SAB IAB Ectopic Molar Multiple Live Births   0 1 0 0 0 4      She  reports being sexually active and has had partner(s) who are male. She reports using the following methods of birth control/protection: Condom and Pill.    She  has a past medical history of Dyslipidemia (2023), Female cystocele (2015), Graves disease (2020), Hypertension (6164-1361), and Thyrotoxicosis (2020).    She has no past medical history of Addisons disease ([facility]), Adrenal disorder ([facility]), Allergy, Anemia, Anxiety, Arrhythmia, Arthritis, ASTHMA, Blood transfusion, Cancer ([facility]), CATARACT, CHF (congestive heart failure) ([facility]), Clotting disorder ([facility]), COPD, Cushings syndrome ([facility]), Depression, Diabetes, Diabetic neuropathy ([facility]), Dialysis patient ([facility]), EMPHYSEMA, GERD (gastroesophageal reflux disease), Glaucoma, Headache(784.0), Heart attack ([facility]), Heart murmur, HIV (human immunodeficiency virus infection), IBD (inflammatory bowel disease), Jaundice, Kidney disease, Meningitis,  Migraine, Muscle disorder, OSTEOPOROSIS, Pacemaker, Parathyroid disorder (HCC), Pituitary disease (HCC), Pneumonia, Psychiatric problem, Rheumatic fever, Seizure (HCC), Sickle cell disease (HCC), Stroke (HCC), Substance abuse (HCC), Tuberculosis, Ulcer, or Urinary tract infection, site not specified.  She  has a past surgical history that includes other abdominal surgery; dilation and curettage (2013); primary c section (2006); and repeat c section w tubal ligation (2/20/2014).    Family History   Problem Relation Age of Onset    Cancer Mother         unknown    Diabetes Father         diet and pills    Hypertension Father     Diabetes Sister         eldest sister, controlled with diet and pills    Other Paternal Grandmother         kidney transplant     Social History     Tobacco Use    Smoking status: Former     Current packs/day: 0.00     Average packs/day: 0.3 packs/day for 3.0 years (0.8 ttl pk-yrs)     Types: Cigarettes     Start date: 3/19/2010     Quit date: 3/19/2013     Years since quitting: 10.8    Smokeless tobacco: Never    Tobacco comments:     1 cig dialy    Vaping Use    Vaping Use: Never used   Substance Use Topics    Alcohol use: No    Drug use: No       Patient Active Problem List    Diagnosis Date Noted    Pain in both wrists 02/07/2024    Vitamin D deficiency 02/07/2024    Prediabetes 09/20/2023    Dyslipidemia 09/20/2023    Family history of diabetes mellitus 08/16/2023    Palpitations 08/04/2022    Class 1 obesity due to excess calories without serious comorbidity with body mass index (BMI) of 33.0 to 33.9 in adult 09/08/2021    Acanthosis nigricans 09/08/2021    Toxic diffuse goiter without crisis 09/08/2021    Graves disease 11/11/2020    Hypertension 11/01/2020    Female cystocele 02/13/2015     Current Outpatient Medications   Medication Sig Dispense Refill    Cholecalciferol (VITAMIN D3) 1.25 MG (17147 UT) Cap Take  by mouth.      meloxicam (MOBIC) 15 MG tablet Take 1 Tablet by mouth  "every day for 14 days. Take with food in the morning 14 Tablet 0    losartan (COZAAR) 50 MG Tab Take 1 Tablet by mouth 2 times a day. 180 Tablet 0    amLODIPine (NORVASC) 5 MG Tab Take 1 Tablet by mouth 2 times a day. 180 Tablet 3    therapeutic multivitamin-minerals (THERAGRAN-M) Tab Take 1 Tablet by mouth every day.       No current facility-administered medications for this visit.     Allergies   Allergen Reactions    Nkda [No Known Drug Allergy]        Review of Systems   Review of Systems   Constitutional:  Negative for chills and fever.   Respiratory:  Negative for cough and sputum production.    Cardiovascular:  Negative for chest pain and palpitations.   Gastrointestinal:  Negative for abdominal pain, blood in stool and vomiting.         Objective:   /80 (BP Location: Right arm, Patient Position: Sitting, BP Cuff Size: Adult)   Pulse 88   Temp 36.6 °C (97.9 °F)   Resp 16   Ht 1.626 m (5' 4\")   Wt 91 kg (200 lb 9.6 oz)   SpO2 98%   BMI 34.43 kg/m²     Wt Readings from Last 4 Encounters:   02/07/24 91 kg (200 lb 9.6 oz)   09/20/23 89.4 kg (197 lb 1.5 oz)   08/16/23 91.5 kg (201 lb 11.5 oz)   10/03/22 90.7 kg (200 lb)         Physical Exam:  Physical Exam  Constitutional:       Appearance: Normal appearance.   HENT:      Head: Normocephalic and atraumatic.      Right Ear: Tympanic membrane and ear canal normal.      Left Ear: Tympanic membrane and ear canal normal.      Mouth/Throat:      Mouth: Mucous membranes are moist.      Pharynx: Oropharynx is clear.   Eyes:      Extraocular Movements: Extraocular movements intact.      Pupils: Pupils are equal, round, and reactive to light.   Neck:      Thyroid: No thyromegaly.   Cardiovascular:      Rate and Rhythm: Normal rate and regular rhythm.      Heart sounds: Normal heart sounds.   Pulmonary:      Effort: Pulmonary effort is normal.      Breath sounds: Normal breath sounds.   Abdominal:      General: Abdomen is flat. Bowel sounds are normal.      " Palpations: Abdomen is soft. There is no mass.      Tenderness: There is no abdominal tenderness. There is no guarding.   Musculoskeletal:      Cervical back: Normal range of motion and neck supple.      Right lower leg: No edema.      Left lower leg: No edema.   Lymphadenopathy:      Cervical: No cervical adenopathy.   Skin:     General: Skin is warm and dry.   Neurological:      General: No focal deficit present.      Mental Status: She is alert.           Assessment and Plan:     1. Hypertension, unspecified type    2. Pain in both wrists  - Referral to Physical Therapy  - meloxicam (MOBIC) 15 MG tablet; Take 1 Tablet by mouth every day for 14 days. Take with food in the morning  Dispense: 14 Tablet; Refill: 0    3. Vitamin D deficiency    4. Well woman exam  - Referral to Gynecology    5. Need for vaccination  - Tdap =>6yo IM    Other orders  - Cholecalciferol (VITAMIN D3) 1.25 MG (66061 UT) Cap; Take  by mouth.      Health maintenance:    Labs per orders  Immunizations per orders  Age-appropriate industry guidance discussed including diet and exercise  Discussed  diet and exercise     Follow-up: 1 year annual

## 2024-02-08 LAB
ANION GAP SERPL CALC-SCNC: 16 MMOL/L (ref 7–16)
BUN SERPL-MCNC: 10 MG/DL (ref 8–22)
CALCIUM SERPL-MCNC: 8.6 MG/DL (ref 8.5–10.5)
CHLORIDE SERPL-SCNC: 103 MMOL/L (ref 96–112)
CHOLEST SERPL-MCNC: 199 MG/DL (ref 100–199)
CO2 SERPL-SCNC: 20 MMOL/L (ref 20–33)
CREAT SERPL-MCNC: 0.62 MG/DL (ref 0.5–1.4)
FASTING STATUS PATIENT QL REPORTED: NORMAL
GFR SERPLBLD CREATININE-BSD FMLA CKD-EPI: 120 ML/MIN/1.73 M 2
GLUCOSE SERPL-MCNC: 74 MG/DL (ref 65–99)
HDLC SERPL-MCNC: 55 MG/DL
LDLC SERPL CALC-MCNC: 109 MG/DL
POTASSIUM SERPL-SCNC: 4.1 MMOL/L (ref 3.6–5.5)
SODIUM SERPL-SCNC: 139 MMOL/L (ref 135–145)
T4 FREE SERPL-MCNC: 1.21 NG/DL (ref 0.93–1.7)
TRIGL SERPL-MCNC: 175 MG/DL (ref 0–149)
TSH SERPL DL<=0.005 MIU/L-ACNC: 2.14 UIU/ML (ref 0.38–5.33)

## 2024-02-13 DIAGNOSIS — M25.532 PAIN IN BOTH WRISTS: ICD-10-CM

## 2024-02-13 DIAGNOSIS — M25.531 PAIN IN BOTH WRISTS: ICD-10-CM

## 2024-02-29 ENCOUNTER — APPOINTMENT (OUTPATIENT)
Dept: OCCUPATIONAL THERAPY | Facility: REHABILITATION | Age: 34
End: 2024-02-29
Attending: STUDENT IN AN ORGANIZED HEALTH CARE EDUCATION/TRAINING PROGRAM
Payer: COMMERCIAL

## 2024-02-29 NOTE — OP THERAPY EVALUATION
Outpatient Occupational Therapy  HAND THERAPY INITIAL EVALUATION    [facility] [address]  [address]  [place] [address]  Phone:  [phone]  Fax:  [phone]    Date of Evaluation: 02/29/2024    Patient: Melina Mcmahan  YOB: 1990  MRN: [id number]     Referring Provider: Dennis Pride D.O.  [address] [facility] [address]  [place] [address]   Referring Diagnosis Pain in right wrist [M25.531];Pain in left wrist [M25.532]     Time Calculation                       Chief Complaint: No chief complaint on file.    Visit Diagnoses     ICD-10-CM   1. Pain in both wrists  M25.531    M25.532       Subjective    Past Medical History:   Diagnosis Date    Dyslipidemia 9/20/2023    Female cystocele 2/13/2015    Graves disease 11/11/2020    Hypertension [id number]    put on bedrest at 4mths    Thyrotoxicosis 11/1/2020     Past Surgical History:   Procedure Laterality Date    REPEAT C SECTION W TUBAL LIGATION  2/20/2014    Performed by Barbara Velasco M.D. at [facility]    DILATION AND CURETTAGE  2013    D & C    PRIMARY C SECTION  2006    position of baby, face first     OTHER ABDOMINAL SURGERY      c section     Social History     Tobacco Use    Smoking status: Former     Current packs/day: 0.00     Average packs/day: 0.3 packs/day for 3.0 years (0.8 ttl pk-yrs)     Types: Cigarettes     Start date: 3/19/2010     Quit date: 3/19/2013     Years since quitting: 10.9    Smokeless tobacco: Never    Tobacco comments:     1 cig dialy    Substance Use Topics    Alcohol use: No     Family and Occupational History     Socioeconomic History    Marital status:      Spouse name: Not on file    Number of children: Not on file    Years of education: Not on file    Highest education level: 10th grade   Occupational History    Not on file       Objective    Exercises/Treatments  Time-based treatments/modalities:         Assessment/Response/Plan    Functional  Assessment Used        Referring provider co-signature:  I have reviewed this plan of care and my co-signature certifies the need for services.    Certification Period: 02/29/2024 to  {Future Date:24418}    Physician Signature: ________________________________ Date: ______________

## 2024-03-07 ENCOUNTER — OCCUPATIONAL THERAPY (OUTPATIENT)
Dept: OCCUPATIONAL THERAPY | Facility: REHABILITATION | Age: 34
End: 2024-03-07
Attending: STUDENT IN AN ORGANIZED HEALTH CARE EDUCATION/TRAINING PROGRAM
Payer: COMMERCIAL

## 2024-03-07 DIAGNOSIS — M25.531 PAIN IN BOTH WRISTS: ICD-10-CM

## 2024-03-07 DIAGNOSIS — M25.532 PAIN IN BOTH WRISTS: ICD-10-CM

## 2024-03-07 PROCEDURE — 97110 THERAPEUTIC EXERCISES: CPT

## 2024-03-07 PROCEDURE — 97165 OT EVAL LOW COMPLEX 30 MIN: CPT

## 2024-03-07 ASSESSMENT — ENCOUNTER SYMPTOMS
PAIN SCALE AT LOWEST: 2
PAIN SCALE: 2
PAIN SCALE AT HIGHEST: 9

## 2024-03-07 NOTE — OP THERAPY EVALUATION
Outpatient Occupational Therapy  HAND THERAPY INITIAL EVALUATION    [facility] [address].  [address]  [place] [address]  Phone:  [phone]  Fax:  [phone]    Date of Evaluation: 2024    Patient: Melina Mcmahan  YOB: 1990  MRN: [id number]     Referring Provider: Dennis Pride D.O.  [address] [facility] [address]  [place] [address]   Referring Diagnosis Pain in right wrist [M25.531];Pain in left wrist [M25.532]     Time Calculation    Start time: 09  Stop time: 1015 Time Calculation (min): 45 minutes             Chief Complaint: Arm Problem    Visit Diagnoses     ICD-10-CM   1. Pain in both wrists  M25.531    M25.532       Subjective:   History of Present Illness:     Mechanism of injury:  Pain in R hand about 4-5 months, started with soreness in wrist at work, had a sharp pain one day pulling up pants and has since gotten progressively worse, pain has moved up to elbow. She reports some pain/soreness in L wrist as she has been using it more due to R hand pain.   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   Patient Goals:     Patient goals for therapy:  Decreased pain and independence with ADLs/IADLs      Past Medical History:   Diagnosis Date    Dyslipidemia 2023    Female cystocele 2015    Graves disease 2020    Hypertension 4465-2836    put on bedrest at 4mths    Thyrotoxicosis  11/1/2020     Past Surgical History:   Procedure Laterality Date    REPEAT C SECTION W TUBAL LIGATION  2/20/2014    Performed by Barbara Velasco M.D. at [facility]    DILATION AND CURETTAGE  2013    D & C    PRIMARY C SECTION  2006    position of baby, face first     OTHER ABDOMINAL SURGERY      c section     Social History     Tobacco Use    Smoking status: Former     Current packs/day: 0.00     Average packs/day: 0.3 packs/day for 3.0 years (0.8 ttl pk-yrs)     Types: Cigarettes     Start date: 3/19/2010     Quit date: 3/19/2013     Years since quitting: 10.9    Smokeless tobacco: Never    Tobacco comments:     1 cig dialy    Substance Use Topics    Alcohol use: No     Family and Occupational History     Socioeconomic History    Marital status:      Spouse name: Not on file    Number of children: Not on file    Years of education: Not on file    Highest education level: 10th grade   Occupational History    Not on file       Objective     Active Range of Motion     Left Wrist   Normal active range of motion    Right Wrist   Wrist flexion: WFL  Wrist extension: WFl  Wrist pronation: WFL  Wrist supination: WFL and with pain  Radial deviation: WFL  Ulnar deviation: WFL    Right Thumb     Normal active range of motion    Right Digits   Normal active range of motion    Passive Range of Motion     Right Wrist   Wrist flexion: WFL  Wrist extension: WFL and with pain  Wrist pronation: WFL  Wrist supination: WFL  Radial deviation: WFL  Ulnar deviation: WFL     Additional Passive Range of Motion Details  Pain with abduction of thumb    Strength     Left Wrist/Hand      (2nd hand position)     Trial 1: 72    Right Wrist/Hand      (2nd hand position)     Trial 1: 66    Tests     Right Wrist/Hand   Positive Finkelstein's.     Additional Tests Details  +WHAT test    General Comments     Wrist/Hand Comments  Pain with palpation at elbow near but not on medial epicondyle         Therapeutic Exercises  (CPT 45283):     1. chip clip adductor release, 2:00 min, BUE    Therapeutic Exercise Summary:  Access Code: [email or web address]  URL: [email or web address]  Date: 03/07/2024  Prepared by: Colleen Saez    Exercises  - Wrist Prayer Stretch  - 1 x daily - 7 x weekly - 3 sets - 10 reps  - Standing Bent Over on Chair Single Arm Tricep Extension with Dumbbell  - 1 x daily - 7 x weekly - 3 sets - 10 reps  - Seated Wrist Extension with Dumbbell  - 1 x daily - 7 x weekly - 3 sets - 10 reps  - Forearm Supination with Dumbbell  - 1 x daily - 7 x weekly - 3 sets - 10 reps  - Thumb Flexion with Resistance  - 1 x daily - 7 x weekly - 3 sets - 10 reps    Therapeutic Treatments and Modalities:    1. Hot or Cold Pack Therapy (CPT 87985), RUE, hot pack to R forearm and wrist as exploration of heat as pain management, positive response, recommended consistent heat at home    2. Therapeutic Activities (CPT 20874), kinestiotape applied to R wrist to facilitate pisiform dorsally and for support    Time-based treatments/modalities:  Occupational Therapy Timed Treatment Charges  Therapeutic exercise minutes (CPT 13089): 15 minutes      Assessment and Plan:   Problem list/assessment: decreased HEP knowledge, decreased strength, limited ADL's and pain    Assessment details:  Patient presents to OT with pain primarily in R wrist, at times travelling up to elbow, reports intermittent soreness in L wrist, unable to replicate during today's session, symptoms in R wrist consistent with possible deQuervain's syndrome. We initiated HEP and soft tissue work with improvements in pain within session. Anticipate good progress.   Barriers to therapy:  None    Goals:   Short Term Goals: decrease pain, increase soft tissue/skin mobility, increase strength and independent with HEP performance  Short term goal timespan:  2-4 weeks  Patient progress towards short term goals:  HEP    Long Term Goals:   Patient will be independent with HEP for long term management  of injury   Patient will improve score on UEFI from 30/80 to 60/80 to indicate improving BUE function  Patient will report no pain with getting dressed  Patient will report no pain with combing hair   Long term goal timespan:  6-8 weeks    Plan:   Occupational/Hand Therapy options:  Occupational therapy treatment to continue  Planned therapy interventions:  AROM, A/AROM, PROM, passive stretch, pain management, graded resistive tasks to increase strength, adaptive training to increase functional performance, home exercise training, manual therapy (CPT 27115) and patient/family/caregiver education  Planned modality interventions: moist hot pack (CPT 33151) and paraffin treatment (CPT 89209)  Prognosis: good    Frequency:  1x week  Duration in weeks:  10  Duration in visits:  10  Discussed with:  Patient  Patient/caregiver understanding of therapy treatment and goals: Good understanding of therapy treatment and goals  Plan details:  Progress pain free use of BUE, HEP education, soft tissue mobilization as warranted, taping as warranted for reduced pain with activity       Functional Assessment Used    UEFI 30/80    Referring provider co-signature:  I have reviewed this plan of care and my co-signature certifies the need for services.    Certification Period: 03/07/2024 to  06/05/24    Physician Signature: ________________________________ Date: ______________

## 2024-03-14 ENCOUNTER — APPOINTMENT (OUTPATIENT)
Dept: OCCUPATIONAL THERAPY | Facility: REHABILITATION | Age: 34
End: 2024-03-14
Attending: STUDENT IN AN ORGANIZED HEALTH CARE EDUCATION/TRAINING PROGRAM
Payer: COMMERCIAL

## 2024-04-02 ENCOUNTER — APPOINTMENT (OUTPATIENT)
Dept: OCCUPATIONAL THERAPY | Facility: REHABILITATION | Age: 34
End: 2024-04-02
Attending: STUDENT IN AN ORGANIZED HEALTH CARE EDUCATION/TRAINING PROGRAM
Payer: COMMERCIAL

## 2024-04-09 ENCOUNTER — APPOINTMENT (OUTPATIENT)
Dept: OCCUPATIONAL THERAPY | Facility: REHABILITATION | Age: 34
End: 2024-04-09
Attending: STUDENT IN AN ORGANIZED HEALTH CARE EDUCATION/TRAINING PROGRAM
Payer: COMMERCIAL

## 2024-04-16 ENCOUNTER — APPOINTMENT (OUTPATIENT)
Dept: OCCUPATIONAL THERAPY | Facility: REHABILITATION | Age: 34
End: 2024-04-16
Attending: STUDENT IN AN ORGANIZED HEALTH CARE EDUCATION/TRAINING PROGRAM
Payer: COMMERCIAL

## 2024-04-19 ENCOUNTER — TELEPHONE (OUTPATIENT)
Dept: HEALTH INFORMATION MANAGEMENT | Facility: OTHER | Age: 34
End: 2024-04-19
Payer: COMMERCIAL

## 2024-04-19 DIAGNOSIS — Z01.419 WELL WOMAN EXAM: ICD-10-CM

## 2024-04-19 NOTE — TELEPHONE ENCOUNTER
1. Caller Name: Melina Mcmahan                          Call Back Number: ph        How would the patient prefer to be contacted with a response: MyChart message    2. SPECIFIC Action To Be Taken: Orders pending, please sign.    3. Diagnosis/Clinical Reason for Request: FEMALE EXAM/PAP SMEAR    4. Specialty & Provider Name/Lab/Imaging Location: GYN REFERRAL    5. Is appointment scheduled for requested order/referral: no    Patient was not informed they will receive a return phone call from the office ONLY if there are any questions before processing their request. Advised to call back if they haven't received a call from the referral department in 5 days.

## 2024-04-23 ENCOUNTER — APPOINTMENT (OUTPATIENT)
Dept: OCCUPATIONAL THERAPY | Facility: REHABILITATION | Age: 34
End: 2024-04-23
Attending: STUDENT IN AN ORGANIZED HEALTH CARE EDUCATION/TRAINING PROGRAM
Payer: COMMERCIAL

## 2024-04-30 ENCOUNTER — APPOINTMENT (OUTPATIENT)
Dept: OCCUPATIONAL THERAPY | Facility: REHABILITATION | Age: 34
End: 2024-04-30
Attending: STUDENT IN AN ORGANIZED HEALTH CARE EDUCATION/TRAINING PROGRAM
Payer: COMMERCIAL

## 2024-05-07 ENCOUNTER — APPOINTMENT (OUTPATIENT)
Dept: OCCUPATIONAL THERAPY | Facility: REHABILITATION | Age: 34
End: 2024-05-07
Attending: STUDENT IN AN ORGANIZED HEALTH CARE EDUCATION/TRAINING PROGRAM
Payer: COMMERCIAL

## 2024-05-14 ENCOUNTER — APPOINTMENT (OUTPATIENT)
Dept: OCCUPATIONAL THERAPY | Facility: REHABILITATION | Age: 34
End: 2024-05-14
Attending: STUDENT IN AN ORGANIZED HEALTH CARE EDUCATION/TRAINING PROGRAM
Payer: COMMERCIAL

## 2024-05-16 ENCOUNTER — APPOINTMENT (OUTPATIENT)
Dept: MEDICAL GROUP | Facility: LAB | Age: 34
End: 2024-05-16
Payer: COMMERCIAL

## 2024-05-16 VITALS
BODY MASS INDEX: 34.66 KG/M2 | HEART RATE: 88 BPM | DIASTOLIC BLOOD PRESSURE: 72 MMHG | SYSTOLIC BLOOD PRESSURE: 118 MMHG | RESPIRATION RATE: 16 BRPM | WEIGHT: 203.04 LBS | TEMPERATURE: 97.7 F | HEIGHT: 64 IN | OXYGEN SATURATION: 100 %

## 2024-05-16 DIAGNOSIS — E66.09 CLASS 1 OBESITY DUE TO EXCESS CALORIES WITHOUT SERIOUS COMORBIDITY WITH BODY MASS INDEX (BMI) OF 33.0 TO 33.9 IN ADULT: ICD-10-CM

## 2024-05-16 PROCEDURE — 99213 OFFICE O/P EST LOW 20 MIN: CPT | Performed by: STUDENT IN AN ORGANIZED HEALTH CARE EDUCATION/TRAINING PROGRAM

## 2024-05-16 PROCEDURE — 3078F DIAST BP <80 MM HG: CPT | Performed by: STUDENT IN AN ORGANIZED HEALTH CARE EDUCATION/TRAINING PROGRAM

## 2024-05-16 PROCEDURE — 3074F SYST BP LT 130 MM HG: CPT | Performed by: STUDENT IN AN ORGANIZED HEALTH CARE EDUCATION/TRAINING PROGRAM

## 2024-05-16 ASSESSMENT — ENCOUNTER SYMPTOMS
FEVER: 0
SHORTNESS OF BREATH: 0
CHILLS: 0

## 2024-05-16 ASSESSMENT — FIBROSIS 4 INDEX: FIB4 SCORE: 0.56

## 2024-05-16 NOTE — PROGRESS NOTES
"Subjective:     CC:   Chief Complaint   Patient presents with    Weight Loss        HPI:       Problem   Class 1 Obesity Due to Excess Calories Without Serious Comorbidity With Body Mass Index (Bmi) of 33.0 to 33.9 in Adult    Exercises 3x/week, 1 hour each. She is trying to get 10k steps/day     24 hour diet recall  -coffee and protein   -sandwhich with keto bread and diet Dr. Loepz  -chicken and rice for dinner     Her dinner is around 6PM and she sleeps at 7-8.                Current Outpatient Medications Ordered in Epic   Medication Sig Dispense Refill    Cholecalciferol (VITAMIN D3) 1.25 MG (58743 UT) Cap Take  by mouth.      losartan (COZAAR) 50 MG Tab Take 1 Tablet by mouth 2 times a day. 180 Tablet 0    amLODIPine (NORVASC) 5 MG Tab Take 1 Tablet by mouth 2 times a day. 180 Tablet 3    therapeutic multivitamin-minerals (THERAGRAN-M) Tab Take 1 Tablet by mouth every day.       No current [facility]-ordered facility-administered medications on file.           ROS:  Review of Systems   Constitutional:  Negative for chills and fever.   Respiratory:  Negative for shortness of breath.    Cardiovascular:  Negative for chest pain.       Objective:     Exam:  /72 (BP Location: Right arm, Patient Position: Sitting, BP Cuff Size: Adult)   Pulse 88   Temp 36.5 °C (97.7 °F) (Temporal)   Resp 16   Ht 1.626 m (5' 4\")   Wt 92.1 kg (203 lb 0.7 oz)   SpO2 100%   BMI 34.85 kg/m²  Body mass index is 34.85 kg/m².    Physical Exam  Constitutional:       General: She is not in acute distress.     Appearance: She is not ill-appearing.   Pulmonary:      Effort: Pulmonary effort is normal.   Neurological:      Mental Status: She is alert.   Psychiatric:         Mood and Affect: Mood normal.         Behavior: Behavior normal.         Thought Content: Thought content normal.         Judgment: Judgment normal.                   Assessment & Plan:     Problem List Items Addressed This Visit       Class 1 obesity due to excess " calories without serious comorbidity with body mass index (BMI) of 33.0 to 33.9 in adult     Chronic  -recommend patient to continue exercise  -she will also change her diet where she is consuming more of her calories earlier in the day  -did discuss benefiber with prebiotics   -may consider phentermine at next visit           3 month weight follow up     Please note that this dictation was created using voice recognition software. I have made every reasonable attempt to correct obvious errors, but I expect that there are errors of grammar and possibly content that I did not discover before finalizing the note.

## 2024-05-21 ENCOUNTER — APPOINTMENT (OUTPATIENT)
Dept: OCCUPATIONAL THERAPY | Facility: REHABILITATION | Age: 34
End: 2024-05-21
Attending: STUDENT IN AN ORGANIZED HEALTH CARE EDUCATION/TRAINING PROGRAM
Payer: COMMERCIAL

## 2024-06-03 ENCOUNTER — APPOINTMENT (OUTPATIENT)
Dept: OBGYN | Facility: CLINIC | Age: 34
End: 2024-06-03
Payer: COMMERCIAL

## 2024-06-03 ENCOUNTER — HOSPITAL ENCOUNTER (OUTPATIENT)
Facility: MEDICAL CENTER | Age: 34
End: 2024-06-03
Attending: OBSTETRICS & GYNECOLOGY
Payer: COMMERCIAL

## 2024-06-03 VITALS
BODY MASS INDEX: 34.97 KG/M2 | SYSTOLIC BLOOD PRESSURE: 135 MMHG | HEIGHT: 64 IN | WEIGHT: 204.8 LBS | DIASTOLIC BLOOD PRESSURE: 80 MMHG

## 2024-06-03 DIAGNOSIS — N92.6 IRREGULAR MENSES: ICD-10-CM

## 2024-06-03 DIAGNOSIS — Z01.419 WELL WOMAN EXAM WITH ROUTINE GYNECOLOGICAL EXAM: ICD-10-CM

## 2024-06-03 DIAGNOSIS — I10 PRIMARY HYPERTENSION: ICD-10-CM

## 2024-06-03 DIAGNOSIS — N39.46 MIXED INCONTINENCE: ICD-10-CM

## 2024-06-03 DIAGNOSIS — N94.10 DYSPAREUNIA IN FEMALE: ICD-10-CM

## 2024-06-03 DIAGNOSIS — R73.03 PREDIABETES: ICD-10-CM

## 2024-06-03 PROCEDURE — 99385 PREV VISIT NEW AGE 18-39: CPT | Performed by: OBSTETRICS & GYNECOLOGY

## 2024-06-03 PROCEDURE — 3075F SYST BP GE 130 - 139MM HG: CPT | Performed by: OBSTETRICS & GYNECOLOGY

## 2024-06-03 PROCEDURE — 3079F DIAST BP 80-89 MM HG: CPT | Performed by: OBSTETRICS & GYNECOLOGY

## 2024-06-03 PROCEDURE — 87624 HPV HI-RISK TYP POOLED RSLT: CPT

## 2024-06-03 PROCEDURE — 88175 CYTOPATH C/V AUTO FLUID REDO: CPT

## 2024-06-03 ASSESSMENT — FIBROSIS 4 INDEX: FIB4 SCORE: 0.56

## 2024-06-03 NOTE — PROGRESS NOTES
Annual Exam    CC:   Gynecologic Exam      HPI:   Patient is a 33 y.o.  who presents for annual exam.  The patient has questions regarding a recent abnormal menses where she had to close in 1 month.  She generally has regular 28-day cycles.  The patient has recently experienced increasing pain with intercourse.  No significant relationship to her cycle is reported.  She also reports a history of leaking with Valsalva, exercise or coughing as well as occasional episodes of nocturia.  She also had an episode of urinary leakage during her menses.  Significant past medical history includes hypertension, Graves' disease, prediabetes which are followed by her PCP.      GYNECOLOGIC HISTORY:   Current Sexual Activity: yes  History of sexually transmitted diseases? No  Abnormal discharge? No     Menstrual History  Patient's last menstrual period was No LMP recorded.  Periods are regular q 28 days, lasting 3-5 days.  However she had the irregular cycle last month.    Clots or heavy flow: No  Intermenstrual bleeding/spotting:  With her last cycle  Sexual problems: Yes  Significant pelvic pain: Yes  Bothersome PMS: No  Bothersome menopausal symptoms: No     CONTRACEPTION:  sterilization     PAP HISTORY:  Last Pap:   x Moderate or Severe Dysplasia : No     OBSTETRIC HISTORY:  OB History    Para Term  AB Living   6 5 4 1 1 5   SAB IAB Ectopic Molar Multiple Live Births     0       5      # Outcome Date GA Lbr Raj/2nd Weight Sex Delivery Anes PTL Lv   6  14 37w0d  4 lb F CS-Unspec   ELIZABETH      Birth Comments: D&C no complications      Complications: Heart abnormality   5 Term 12 40w0d  7 lb 1 oz F Vag-Spont EPI N ELIZABETH      Birth Comments: no complications   4 Term 09 40w0d  6 lb 9 oz F Vag-Spont EPI N ELIZABETH      Birth Comments: baby given up for adoption, no complications   3 Term 07 40w0d  6 lb 7 oz F VAGINAL MINDY EPI N ELIZABETH      Birth Comments: no complications   2 Term 06  40w0d  6 lb 8 oz M CS-LTranv Spinal N ELIZABETH      Birth Comments: Primary C/S face first, not head down, hypertention put on bedrest at 4mths   1 AB 2005 6w0d       FD       MEDICAL HISTORY:  Past Medical History:   Diagnosis Date    Dyslipidemia 9/20/2023    Female cystocele 2/13/2015    Graves disease 11/11/2020    Hypertension [id number]    put on bedrest at 4mths    Thyrotoxicosis 11/1/2020       SURGICAL HISTORY:  Past Surgical History:   Procedure Laterality Date    REPEAT C SECTION W TUBAL LIGATION  2/20/2014    Performed by Barbara Velasco M.D. at [facility]    DILATION AND CURETTAGE  2013    D & C    PRIMARY C SECTION  2006    position of baby, face first     OTHER ABDOMINAL SURGERY      c section       FAMILY HISTORY:  Family History   Problem Relation Age of Onset    Cancer Mother         unknown    Diabetes Father         diet and pills    Hypertension Father     Diabetes Sister         eldest sister, controlled with diet and pills    COPD Sister     Other Paternal Grandmother         kidney transplant       MEDICATIONS:  Current Outpatient Medications   Medication Sig    Cholecalciferol (VITAMIN D3) 1.25 MG (70202 UT) Cap Take  by mouth.    losartan (COZAAR) 50 MG Tab Take 1 Tablet by mouth 2 times a day.    amLODIPine (NORVASC) 5 MG Tab Take 1 Tablet by mouth 2 times a day.    therapeutic multivitamin-minerals (THERAGRAN-M) Tab Take 1 Tablet by mouth every day.       ALLERGIES / REACTIONS:  Allergies   Allergen Reactions    Nkda [No Known Drug Allergy]        SOCIAL HISTORY:   reports that she quit smoking about 11 years ago. Her smoking use included cigarettes. She started smoking about 14 years ago. She has a 0.8 pack-year smoking history. She has never used smokeless tobacco. She reports that she does not drink alcohol and does not use drugs.    ROS:   Positive ROS: See HPI  Gen: no fevers or chills, no significant weight loss or gain, excessive fatigue  Respiratory:  no cough or  "dyspnea  Cardiac:  no chest pain, no palpitations, no syncope  Breast: no breast discharge, pain, lump or skin changes  GI:  no heartburn, no abdominal pain, no nausea or vomiting  Urinary: See HPI  Psych: no depression or anxiety  Neuro: no migraines with aura, fainting spells, numbness or tingling  Extremities: no joint pain, persistently swollen ankles, recurrent leg cramps       PHYSICAL EXAMINATION:  Vital Signs: /80 (BP Location: Left arm, Patient Position: Sitting, BP Cuff Size: Large adult)   Ht 5' 4\"   Wt 204 lb 12.8 oz   BMI 35.15 kg/m²   Constitutional: The patient is well developed and well nourished, obese  Psychiatric: Patient is oriented to time place and person.   Skin: No rash observed.  Neck: Neck appears symmetric.  Respiratory: normal effort  Breast: Diffuse outer cystic changes without dominant masses or skin changes  Abdomen: Soft, non-tender. Non distended, nontender  Pelvic Exam:     External female genitalia: normal appearance    Urethra - no lesions, no erythema    Vagina: moist, pink, normal ruggae.  Mild menstrual flow.  The patient has tenderness with palpation of the upper vagina which reproduces her pain during intercourse.    Cervix: pink, smooth, no lesions, no CMT    Uterus - non-tender, normal size, shape, contour, mobile,     Ovaries: non-tender, no appreciable masses  Extremeties: Legs are symmetric and without tenderness. There is no edema present.    ASSESSMENT AND PLAN:  33 y.o.   young woman who presents for annual exam with concerns regarding a recent irregular cycle.  The patient has several risk factors for irregular menses including obesity, prediabetes.  She also has several chronic medical problems followed by her PCP.  She was counseled regarding weight loss and blood sugar control.  She will be expectantly managed for her 1 episode of irregular menses.  She also has a history consistent with mixed urinary incontinence with urinary stress incontinence " more symptomatic.  She is referred for pelvic floor therapy.  A Pap smear was performed and her labs were generally within normal limits with the exception of mild dyslipidemia and low vitamin D.  The patient was counseled regarding diet exercise and weight loss as well.  Follow-up as needed, annual exam.    1. Well woman exam with routine gynecological exam  - THINPREP PAP WITH HPV; Future    2. Primary hypertension    3. Dyspareunia in female    4. Prediabetes    5. Irregular menses    6. Mixed incontinence  - Referral to Physical Therapy        Wesly Dumont M.D.

## 2024-06-03 NOTE — PROGRESS NOTES
Patient is new and here for well women exam.     Last pap done/results : none, would like to do one today  LMP : 5/29/24  BCM : sterilization    Pt states she has been getting irregular menses, where she missed the month of April but got two cycles in May. She also states she has discomfort with intercourse.    Pt states she had her tubes tied in 2014 after C/S, but would like consult this. She also has family hx of PCOS with sister and would like to consult.     Phone/Pharmacy verified

## 2024-06-04 DIAGNOSIS — Z01.419 WELL WOMAN EXAM WITH ROUTINE GYNECOLOGICAL EXAM: ICD-10-CM

## 2024-06-07 LAB
CYTOLOGIST CVX/VAG CYTO: NORMAL
CYTOLOGY CVX/VAG DOC CYTO: NORMAL
CYTOLOGY CVX/VAG DOC THIN PREP: NORMAL
HPV I/H RISK 4 DNA CVX QL PROBE+SIG AMP: NEGATIVE
NOTE NL11727A: NORMAL
OTHER STN SPEC: NORMAL
STAT OF ADQ CVX/VAG CYTO-IMP: NORMAL

## 2024-06-24 DIAGNOSIS — I10 HYPERTENSION, UNSPECIFIED TYPE: ICD-10-CM

## 2024-06-24 RX ORDER — LOSARTAN POTASSIUM 50 MG/1
50 TABLET ORAL 2 TIMES DAILY
Qty: 180 TABLET | Refills: 0 | OUTPATIENT
Start: 2024-06-24

## 2024-06-24 NOTE — TELEPHONE ENCOUNTER
Denied refill request due to active rx at requested pharmacy, possible duplication, and/or patient needs appointment for further refills and or discontinuation.

## 2024-06-24 NOTE — TELEPHONE ENCOUNTER
Received request via: Patient    Was the patient seen in the last year in this department? Yes    Does the patient have an active prescription (recently filled or refills available) for medication(s) requested? No    Pharmacy Name: [facility]    Does the patient have [facility]PC Plus and need 100 day supply (blood pressure, diabetes and cholesterol meds only)? Patient does not have SCP

## 2024-06-25 RX ORDER — AMLODIPINE BESYLATE 5 MG/1
5 TABLET ORAL 2 TIMES DAILY
Qty: 180 TABLET | Refills: 3 | Status: SHIPPED | OUTPATIENT
Start: 2024-06-25 | End: 2025-06-20

## 2024-07-19 ENCOUNTER — PATIENT MESSAGE (OUTPATIENT)
Dept: MEDICAL GROUP | Facility: LAB | Age: 34
End: 2024-07-19
Payer: COMMERCIAL

## 2024-07-19 DIAGNOSIS — I10 HYPERTENSION, UNSPECIFIED TYPE: ICD-10-CM

## 2024-07-22 RX ORDER — LOSARTAN POTASSIUM 50 MG/1
50 TABLET ORAL 2 TIMES DAILY
Qty: 60 TABLET | Refills: 0 | Status: SHIPPED | OUTPATIENT
Start: 2024-07-22

## 2024-08-15 ENCOUNTER — APPOINTMENT (OUTPATIENT)
Dept: MEDICAL GROUP | Facility: LAB | Age: 34
End: 2024-08-15
Payer: COMMERCIAL

## 2024-09-12 ENCOUNTER — APPOINTMENT (OUTPATIENT)
Dept: MEDICAL GROUP | Facility: LAB | Age: 34
End: 2024-09-12
Payer: COMMERCIAL

## 2024-09-17 ENCOUNTER — APPOINTMENT (OUTPATIENT)
Dept: MEDICAL GROUP | Facility: LAB | Age: 34
End: 2024-09-17
Payer: COMMERCIAL

## 2024-09-18 ENCOUNTER — APPOINTMENT (OUTPATIENT)
Dept: MEDICAL GROUP | Facility: LAB | Age: 34
End: 2024-09-18
Payer: COMMERCIAL

## 2024-10-14 DIAGNOSIS — I10 HYPERTENSION, UNSPECIFIED TYPE: ICD-10-CM

## 2024-10-15 RX ORDER — LOSARTAN POTASSIUM 50 MG/1
50 TABLET ORAL 2 TIMES DAILY
Qty: 90 TABLET | Refills: 1 | Status: SHIPPED | OUTPATIENT
Start: 2024-10-15

## 2024-10-22 ENCOUNTER — APPOINTMENT (OUTPATIENT)
Dept: MEDICAL GROUP | Facility: LAB | Age: 34
End: 2024-10-22
Payer: COMMERCIAL

## 2024-10-23 ENCOUNTER — APPOINTMENT (OUTPATIENT)
Dept: MEDICAL GROUP | Facility: LAB | Age: 34
End: 2024-10-23
Payer: COMMERCIAL

## 2024-11-20 ENCOUNTER — APPOINTMENT (OUTPATIENT)
Dept: RADIOLOGY | Facility: MEDICAL CENTER | Age: 34
End: 2024-11-20
Attending: EMERGENCY MEDICINE
Payer: COMMERCIAL

## 2024-11-20 ENCOUNTER — HOSPITAL ENCOUNTER (OUTPATIENT)
Facility: MEDICAL CENTER | Age: 34
End: 2024-11-21
Attending: EMERGENCY MEDICINE | Admitting: INTERNAL MEDICINE
Payer: COMMERCIAL

## 2024-11-20 DIAGNOSIS — E78.5 DYSLIPIDEMIA: ICD-10-CM

## 2024-11-20 DIAGNOSIS — H53.8 BLURRED VISION: ICD-10-CM

## 2024-11-20 DIAGNOSIS — I10 HYPERTENSION, UNSPECIFIED TYPE: ICD-10-CM

## 2024-11-20 PROBLEM — I16.0 HYPERTENSIVE URGENCY: Status: ACTIVE | Noted: 2024-11-20

## 2024-11-20 PROBLEM — R79.89 ELEVATED TROPONIN: Status: ACTIVE | Noted: 2024-11-20

## 2024-11-20 LAB
ABO GROUP BLD: NORMAL
ALBUMIN SERPL BCP-MCNC: 4.7 G/DL (ref 3.2–4.9)
ALBUMIN/GLOB SERPL: 1.3 G/DL
ALP SERPL-CCNC: 68 U/L (ref 30–99)
ALT SERPL-CCNC: <5 U/L (ref 2–50)
ANION GAP SERPL CALC-SCNC: 15 MMOL/L (ref 7–16)
APTT PPP: 25.6 SEC (ref 24.7–36)
AST SERPL-CCNC: 19 U/L (ref 12–45)
BASOPHILS # BLD AUTO: 0.6 % (ref 0–1.8)
BASOPHILS # BLD: 0.06 K/UL (ref 0–0.12)
BILIRUB SERPL-MCNC: 0.5 MG/DL (ref 0.1–1.5)
BLD GP AB SCN SERPL QL: NORMAL
BUN SERPL-MCNC: 14 MG/DL (ref 8–22)
CALCIUM ALBUM COR SERPL-MCNC: 8.8 MG/DL (ref 8.5–10.5)
CALCIUM SERPL-MCNC: 9.4 MG/DL (ref 8.5–10.5)
CHLORIDE SERPL-SCNC: 105 MMOL/L (ref 96–112)
CO2 SERPL-SCNC: 18 MMOL/L (ref 20–33)
CREAT SERPL-MCNC: 0.91 MG/DL (ref 0.5–1.4)
EKG IMPRESSION: NORMAL
EOSINOPHIL # BLD AUTO: 0.09 K/UL (ref 0–0.51)
EOSINOPHIL NFR BLD: 0.8 % (ref 0–6.9)
ERYTHROCYTE [DISTWIDTH] IN BLOOD BY AUTOMATED COUNT: 45.7 FL (ref 35.9–50)
EST. AVERAGE GLUCOSE BLD GHB EST-MCNC: 114 MG/DL
GFR SERPLBLD CREATININE-BSD FMLA CKD-EPI: 85 ML/MIN/1.73 M 2
GLOBULIN SER CALC-MCNC: 3.6 G/DL (ref 1.9–3.5)
GLUCOSE BLD STRIP.AUTO-MCNC: 104 MG/DL (ref 65–99)
GLUCOSE SERPL-MCNC: 111 MG/DL (ref 65–99)
HBA1C MFR BLD: 5.6 % (ref 4–5.6)
HCT VFR BLD AUTO: 40.3 % (ref 37–47)
HGB BLD-MCNC: 12.3 G/DL (ref 12–16)
IMM GRANULOCYTES # BLD AUTO: 0.04 K/UL (ref 0–0.11)
IMM GRANULOCYTES NFR BLD AUTO: 0.4 % (ref 0–0.9)
INR PPP: 0.94 (ref 0.87–1.13)
LYMPHOCYTES # BLD AUTO: 2.83 K/UL (ref 1–4.8)
LYMPHOCYTES NFR BLD: 26.5 % (ref 22–41)
MCH RBC QN AUTO: 23.5 PG (ref 27–33)
MCHC RBC AUTO-ENTMCNC: 30.5 G/DL (ref 32.2–35.5)
MCV RBC AUTO: 76.9 FL (ref 81.4–97.8)
MONOCYTES # BLD AUTO: 0.81 K/UL (ref 0–0.85)
MONOCYTES NFR BLD AUTO: 7.6 % (ref 0–13.4)
NEUTROPHILS # BLD AUTO: 6.86 K/UL (ref 1.82–7.42)
NEUTROPHILS NFR BLD: 64.1 % (ref 44–72)
NRBC # BLD AUTO: 0 K/UL
NRBC BLD-RTO: 0 /100 WBC (ref 0–0.2)
PLATELET # BLD AUTO: 327 K/UL (ref 164–446)
PMV BLD AUTO: 10.5 FL (ref 9–12.9)
POTASSIUM SERPL-SCNC: 3.7 MMOL/L (ref 3.6–5.5)
PROT SERPL-MCNC: 8.3 G/DL (ref 6–8.2)
PROTHROMBIN TIME: 12.6 SEC (ref 12–14.6)
RBC # BLD AUTO: 5.24 M/UL (ref 4.2–5.4)
RH BLD: NORMAL
SODIUM SERPL-SCNC: 138 MMOL/L (ref 135–145)
TROPONIN T SERPL-MCNC: 24 NG/L (ref 6–19)
TROPONIN T SERPL-MCNC: 36 NG/L (ref 6–19)
TSH SERPL DL<=0.005 MIU/L-ACNC: 1.71 UIU/ML (ref 0.38–5.33)
WBC # BLD AUTO: 10.7 K/UL (ref 4.8–10.8)

## 2024-11-20 PROCEDURE — 700117 HCHG RX CONTRAST REV CODE 255

## 2024-11-20 PROCEDURE — 85025 COMPLETE CBC W/AUTO DIFF WBC: CPT

## 2024-11-20 PROCEDURE — 80053 COMPREHEN METABOLIC PANEL: CPT

## 2024-11-20 PROCEDURE — 86850 RBC ANTIBODY SCREEN: CPT

## 2024-11-20 PROCEDURE — 84484 ASSAY OF TROPONIN QUANT: CPT | Mod: 91

## 2024-11-20 PROCEDURE — 700101 HCHG RX REV CODE 250: Performed by: EMERGENCY MEDICINE

## 2024-11-20 PROCEDURE — 700102 HCHG RX REV CODE 250 W/ 637 OVERRIDE(OP): Performed by: INTERNAL MEDICINE

## 2024-11-20 PROCEDURE — G0378 HOSPITAL OBSERVATION PER HR: HCPCS

## 2024-11-20 PROCEDURE — 0042T CT-CEREBRAL PERFUSION ANALYSIS: CPT

## 2024-11-20 PROCEDURE — A9270 NON-COVERED ITEM OR SERVICE: HCPCS | Performed by: EMERGENCY MEDICINE

## 2024-11-20 PROCEDURE — 700102 HCHG RX REV CODE 250 W/ 637 OVERRIDE(OP): Performed by: EMERGENCY MEDICINE

## 2024-11-20 PROCEDURE — 82962 GLUCOSE BLOOD TEST: CPT

## 2024-11-20 PROCEDURE — 70498 CT ANGIOGRAPHY NECK: CPT

## 2024-11-20 PROCEDURE — 86900 BLOOD TYPING SEROLOGIC ABO: CPT

## 2024-11-20 PROCEDURE — 93005 ELECTROCARDIOGRAM TRACING: CPT

## 2024-11-20 PROCEDURE — 99223 1ST HOSP IP/OBS HIGH 75: CPT | Mod: 25 | Performed by: INTERNAL MEDICINE

## 2024-11-20 PROCEDURE — A9270 NON-COVERED ITEM OR SERVICE: HCPCS | Performed by: INTERNAL MEDICINE

## 2024-11-20 PROCEDURE — 85610 PROTHROMBIN TIME: CPT

## 2024-11-20 PROCEDURE — 96374 THER/PROPH/DIAG INJ IV PUSH: CPT

## 2024-11-20 PROCEDURE — 85730 THROMBOPLASTIN TIME PARTIAL: CPT

## 2024-11-20 PROCEDURE — 99497 ADVNCD CARE PLAN 30 MIN: CPT | Performed by: INTERNAL MEDICINE

## 2024-11-20 PROCEDURE — 83036 HEMOGLOBIN GLYCOSYLATED A1C: CPT

## 2024-11-20 PROCEDURE — 86901 BLOOD TYPING SEROLOGIC RH(D): CPT

## 2024-11-20 PROCEDURE — 70496 CT ANGIOGRAPHY HEAD: CPT

## 2024-11-20 PROCEDURE — 71045 X-RAY EXAM CHEST 1 VIEW: CPT

## 2024-11-20 PROCEDURE — 84443 ASSAY THYROID STIM HORMONE: CPT

## 2024-11-20 PROCEDURE — 99285 EMERGENCY DEPT VISIT HI MDM: CPT

## 2024-11-20 PROCEDURE — 94760 N-INVAS EAR/PLS OXIMETRY 1: CPT

## 2024-11-20 PROCEDURE — 36415 COLL VENOUS BLD VENIPUNCTURE: CPT

## 2024-11-20 RX ORDER — ASPIRIN 81 MG/1
81 TABLET ORAL DAILY
Status: DISCONTINUED | OUTPATIENT
Start: 2024-11-21 | End: 2024-11-21 | Stop reason: HOSPADM

## 2024-11-20 RX ORDER — METOPROLOL TARTRATE 1 MG/ML
5 INJECTION, SOLUTION INTRAVENOUS ONCE
Status: COMPLETED | OUTPATIENT
Start: 2024-11-20 | End: 2024-11-20

## 2024-11-20 RX ORDER — AMLODIPINE BESYLATE 5 MG/1
5 TABLET ORAL 2 TIMES DAILY
Status: DISCONTINUED | OUTPATIENT
Start: 2024-11-20 | End: 2024-11-21 | Stop reason: HOSPADM

## 2024-11-20 RX ORDER — OXYCODONE HYDROCHLORIDE 5 MG/1
5 TABLET ORAL
Status: DISCONTINUED | OUTPATIENT
Start: 2024-11-20 | End: 2024-11-21 | Stop reason: HOSPADM

## 2024-11-20 RX ORDER — LOSARTAN POTASSIUM 50 MG/1
50 TABLET ORAL 2 TIMES DAILY
Status: DISCONTINUED | OUTPATIENT
Start: 2024-11-20 | End: 2024-11-21 | Stop reason: HOSPADM

## 2024-11-20 RX ORDER — ONDANSETRON 4 MG/1
4 TABLET, ORALLY DISINTEGRATING ORAL EVERY 4 HOURS PRN
Status: DISCONTINUED | OUTPATIENT
Start: 2024-11-20 | End: 2024-11-21 | Stop reason: HOSPADM

## 2024-11-20 RX ORDER — ASPIRIN 325 MG
325 TABLET ORAL ONCE
Status: COMPLETED | OUTPATIENT
Start: 2024-11-20 | End: 2024-11-20

## 2024-11-20 RX ORDER — PROCHLORPERAZINE EDISYLATE 5 MG/ML
5-10 INJECTION INTRAMUSCULAR; INTRAVENOUS EVERY 4 HOURS PRN
Status: DISCONTINUED | OUTPATIENT
Start: 2024-11-20 | End: 2024-11-21 | Stop reason: HOSPADM

## 2024-11-20 RX ORDER — ENOXAPARIN SODIUM 100 MG/ML
40 INJECTION SUBCUTANEOUS DAILY
Status: DISCONTINUED | OUTPATIENT
Start: 2024-11-20 | End: 2024-11-21 | Stop reason: HOSPADM

## 2024-11-20 RX ORDER — ACETAMINOPHEN 325 MG/1
650 TABLET ORAL EVERY 6 HOURS PRN
Status: DISCONTINUED | OUTPATIENT
Start: 2024-11-20 | End: 2024-11-21 | Stop reason: HOSPADM

## 2024-11-20 RX ORDER — ONDANSETRON 2 MG/ML
4 INJECTION INTRAMUSCULAR; INTRAVENOUS EVERY 4 HOURS PRN
Status: DISCONTINUED | OUTPATIENT
Start: 2024-11-20 | End: 2024-11-21 | Stop reason: HOSPADM

## 2024-11-20 RX ORDER — LABETALOL HYDROCHLORIDE 5 MG/ML
10 INJECTION, SOLUTION INTRAVENOUS EVERY 4 HOURS PRN
Status: DISCONTINUED | OUTPATIENT
Start: 2024-11-20 | End: 2024-11-21 | Stop reason: HOSPADM

## 2024-11-20 RX ORDER — OXYCODONE HYDROCHLORIDE 5 MG/1
2.5 TABLET ORAL
Status: DISCONTINUED | OUTPATIENT
Start: 2024-11-20 | End: 2024-11-21 | Stop reason: HOSPADM

## 2024-11-20 RX ORDER — PROMETHAZINE HYDROCHLORIDE 25 MG/1
12.5-25 TABLET ORAL EVERY 4 HOURS PRN
Status: DISCONTINUED | OUTPATIENT
Start: 2024-11-20 | End: 2024-11-21 | Stop reason: HOSPADM

## 2024-11-20 RX ORDER — HYDROMORPHONE HYDROCHLORIDE 1 MG/ML
0.25 INJECTION, SOLUTION INTRAMUSCULAR; INTRAVENOUS; SUBCUTANEOUS
Status: DISCONTINUED | OUTPATIENT
Start: 2024-11-20 | End: 2024-11-21 | Stop reason: HOSPADM

## 2024-11-20 RX ORDER — PROMETHAZINE HYDROCHLORIDE 25 MG/1
12.5-25 SUPPOSITORY RECTAL EVERY 4 HOURS PRN
Status: DISCONTINUED | OUTPATIENT
Start: 2024-11-20 | End: 2024-11-21 | Stop reason: HOSPADM

## 2024-11-20 RX ADMIN — IOHEXOL 80 ML: 350 INJECTION, SOLUTION INTRAVENOUS at 11:49

## 2024-11-20 RX ADMIN — AMLODIPINE BESYLATE 5 MG: 5 TABLET ORAL at 18:13

## 2024-11-20 RX ADMIN — IOHEXOL 40 ML: 350 INJECTION, SOLUTION INTRAVENOUS at 11:50

## 2024-11-20 RX ADMIN — ASPIRIN 325 MG: 325 TABLET ORAL at 13:00

## 2024-11-20 RX ADMIN — METOPROLOL TARTRATE 5 MG: 5 INJECTION INTRAVENOUS at 13:00

## 2024-11-20 RX ADMIN — LOSARTAN POTASSIUM 50 MG: 50 TABLET, FILM COATED ORAL at 18:13

## 2024-11-20 ASSESSMENT — FIBROSIS 4 INDEX: FIB4 SCORE: 0.93

## 2024-11-20 ASSESSMENT — COGNITIVE AND FUNCTIONAL STATUS - GENERAL
DAILY ACTIVITIY SCORE: 24
SUGGESTED CMS G CODE MODIFIER DAILY ACTIVITY: CH
SUGGESTED CMS G CODE MODIFIER MOBILITY: CH
MOBILITY SCORE: 24

## 2024-11-20 ASSESSMENT — LIFESTYLE VARIABLES
DOES PATIENT WANT TO STOP DRINKING: NO
TOTAL SCORE: 0
EVER HAD A DRINK FIRST THING IN THE MORNING TO STEADY YOUR NERVES TO GET RID OF A HANGOVER: NO
EVER FELT BAD OR GUILTY ABOUT YOUR DRINKING: NO
AVERAGE NUMBER OF DAYS PER WEEK YOU HAVE A DRINK CONTAINING ALCOHOL: 0
ALCOHOL_USE: NO
HAVE YOU EVER FELT YOU SHOULD CUT DOWN ON YOUR DRINKING: NO
TOTAL SCORE: 0
HAVE PEOPLE ANNOYED YOU BY CRITICIZING YOUR DRINKING: NO
TOTAL SCORE: 0
HOW MANY TIMES IN THE PAST YEAR HAVE YOU HAD 5 OR MORE DRINKS IN A DAY: 0
ON A TYPICAL DAY WHEN YOU DRINK ALCOHOL HOW MANY DRINKS DO YOU HAVE: 0
CONSUMPTION TOTAL: NEGATIVE

## 2024-11-20 ASSESSMENT — SOCIAL DETERMINANTS OF HEALTH (SDOH)
WITHIN THE PAST 12 MONTHS, YOU WORRIED THAT YOUR FOOD WOULD RUN OUT BEFORE YOU GOT THE MONEY TO BUY MORE: NEVER TRUE
WITHIN THE PAST 12 MONTHS, THE FOOD YOU BOUGHT JUST DIDN'T LAST AND YOU DIDN'T HAVE MONEY TO GET MORE: NEVER TRUE
WITHIN THE LAST YEAR, HAVE YOU BEEN AFRAID OF YOUR PARTNER OR EX-PARTNER?: PATIENT DECLINED
IN THE PAST 12 MONTHS, HAS THE ELECTRIC, GAS, OIL, OR WATER COMPANY THREATENED TO SHUT OFF SERVICE IN YOUR HOME?: NO
WITHIN THE LAST YEAR, HAVE YOU BEEN KICKED, HIT, SLAPPED, OR OTHERWISE PHYSICALLY HURT BY YOUR PARTNER OR EX-PARTNER?: PATIENT DECLINED
WITHIN THE LAST YEAR, HAVE TO BEEN RAPED OR FORCED TO HAVE ANY KIND OF SEXUAL ACTIVITY BY YOUR PARTNER OR EX-PARTNER?: PATIENT DECLINED
WITHIN THE LAST YEAR, HAVE YOU BEEN HUMILIATED OR EMOTIONALLY ABUSED IN OTHER WAYS BY YOUR PARTNER OR EX-PARTNER?: PATIENT DECLINED

## 2024-11-20 ASSESSMENT — PATIENT HEALTH QUESTIONNAIRE - PHQ9
1. LITTLE INTEREST OR PLEASURE IN DOING THINGS: NOT AT ALL
SUM OF ALL RESPONSES TO PHQ9 QUESTIONS 1 AND 2: 0
2. FEELING DOWN, DEPRESSED, IRRITABLE, OR HOPELESS: NOT AT ALL

## 2024-11-20 ASSESSMENT — ENCOUNTER SYMPTOMS
SPEECH CHANGE: 1
WEAKNESS: 1
BLURRED VISION: 1
HEADACHES: 1

## 2024-11-20 NOTE — ASSESSMENT & PLAN NOTE
Denies any chest pain.  Likely secondary to demand ischemia from hypertensive urgency  Will continue to trend troponin  Manage blood pressure and monitor on telemetry as above

## 2024-11-20 NOTE — ED TRIAGE NOTES
"Chief Complaint   Patient presents with    Possible Stroke     Left lower leg weakness, stuttering speech, blurry vision. Hx of HTN. LKW 0900.        Pt via wheelchair to triage. Pt A&Ox4, room air .     Pt to charge desk . Pt educated on alerting staff in changes to condition. Pt verbalized understanding. Protocol ordered.     BP (!) 173/123   Pulse (!) 148   Temp 37.1 °C (98.8 °F) (Oral)   Resp 20   Ht 1.626 m (5' 4\")   Wt 92.5 kg (204 lb)   SpO2 100%   BMI 35.02 kg/m²     "

## 2024-11-20 NOTE — ED NOTES
Med Rec complete per patient   Allergies reviewed  Antibiotics in the past 30 days:no  Anticoagulant in past 14 days:no  Pharmacy patient utilizes:[facility] on [address]    Pt had RX bottles at bedside. Prescriptions for Amlodipine 5 mg and Losartan 50 mg are both prescribed for as BID however pt states she only takes both of them QD

## 2024-11-20 NOTE — ED NOTES
Break RN: Pt resting with even chest rise and fall, reports no needs at this time, call light available and in reach.

## 2024-11-20 NOTE — H&P
Hospital Medicine History & Physical Note    Date of Service  11/20/2024    Primary Care Physician  Dennis Pride D.O.    Consultants  None    Code Status  Full Code    Chief Complaint  Chief Complaint   Patient presents with    Possible Stroke     Left lower leg weakness, stuttering speech, blurry vision. Hx of HTN. LKW 0900.        History of Presenting Illness  Melina Mcmahan is a 34 y.o. female who presented 11/20/2024 with blurred vision.  Patient reports this morning she developed some bilateral blurred vision, slurred speech, difficulty finding words, weakness of her left lower extremity.  Reports history of hypertension.  She does take amlodipine and losartan at home but sometimes misses doses.  She is also supposed to take both medications twice daily but only takes them in the morning.  She used to check her blood pressure at home but not recently.  When she has a hypertensive she develops headaches.  Denies any chest pains, shortness of breath, fevers, chills, palpitations, dizziness/lightheadedness    On presentation the patient was tachycardic, hypertensive.  Was given IV metoprolol and aspirin in the emergency room.  Labs significant for bicarb 18, troponin 36.    Chest x-ray was personally reviewed and interpreted as no acute cardiopulmonary disease    CT angiogram of the head and neck, CT perfusion study showed no stroke, large vessel occlusion, areas of infarct.    At the time of exam symptomology is pretty much resolved.  Left lower extremity feels a little bit weak.  She has a headache described as tightness.  On exam she has no lower extremity weakness or facial droop    I discussed the plan of care with patient, family, bedside RN, and ERP .    Review of Systems  Review of Systems   Eyes:  Positive for blurred vision.   Neurological:  Positive for speech change, weakness and headaches.       Past Medical History   has a past medical history of Dyslipidemia (9/20/2023), Female cystocele  (2/13/2015), Graves disease (11/11/2020), Hypertension ([id number]), and Thyrotoxicosis (11/1/2020).    Surgical History   has a past surgical history that includes other abdominal surgery; dilation and curettage (2013); primary c section (2006); and repeat c section w tubal ligation (2/20/2014).     Family History  family history includes COPD in her sister; Cancer in her mother; Diabetes in her father and sister; Hypertension in her father; Other in her paternal grandmother.   Family history reviewed with patient. There is family history that is pertinent to the chief complaint.     Social History   reports that she quit smoking about 11 years ago. Her smoking use included cigarettes. She started smoking about 14 years ago. She has a 0.8 pack-year smoking history. She has never used smokeless tobacco. She reports that she does not drink alcohol and does not use drugs.    Allergies  Allergies   Allergen Reactions    Nkda [No Known Drug Allergy]        Medications  Prior to Admission Medications   Prescriptions Last Dose Informant Patient Reported? Taking?   Cholecalciferol (VITAMIN D3) 1.25 MG (37467 UT) Cap   Yes No   Sig: Take  by mouth.   amLODIPine (NORVASC) 5 MG Tab   No No   Sig: Take 1 Tablet by mouth 2 times a day for 360 days.   losartan (COZAAR) 50 MG Tab   No No   Sig: Take 1 Tablet by mouth 2 times a day.   therapeutic multivitamin-minerals (THERAGRAN-M) Tab   Yes No   Sig: Take 1 Tablet by mouth every day.      Facility-Administered Medications: None       Physical Exam  Temp:  [37.1 °C (98.8 °F)] 37.1 °C (98.8 °F)  Pulse:  [] 99  Resp:  [17-24] 17  BP: (149-174)/() 174/104  SpO2:  [96 %-100 %] 96 %  Blood Pressure: (!) 174/104   Temperature: 37.1 °C (98.8 °F)   Pulse: 99   Respiration: 17   Pulse Oximetry: 96 %       Physical Exam  Vitals and nursing note reviewed.   Constitutional:       Appearance: Normal appearance. She is obese. She is ill-appearing.   HENT:      Head: Normocephalic and  "atraumatic.      Right Ear: External ear normal.      Left Ear: External ear normal.      Nose: Nose normal.      Mouth/Throat:      Mouth: Mucous membranes are moist.      Pharynx: Oropharynx is clear. No oropharyngeal exudate or posterior oropharyngeal erythema.   Eyes:      Extraocular Movements: Extraocular movements intact.      Conjunctiva/sclera: Conjunctivae normal.   Cardiovascular:      Rate and Rhythm: Normal rate and regular rhythm.      Pulses: Normal pulses.      Heart sounds: Normal heart sounds. No murmur heard.  Pulmonary:      Effort: Pulmonary effort is normal. No respiratory distress.      Breath sounds: Normal breath sounds. No stridor. No wheezing or rales.   Abdominal:      General: Abdomen is flat. Bowel sounds are normal. There is no distension.      Palpations: Abdomen is soft. There is no mass.      Tenderness: There is no abdominal tenderness.   Musculoskeletal:      Cervical back: Normal range of motion.   Skin:     General: Skin is warm.      Capillary Refill: Capillary refill takes less than 2 seconds.   Neurological:      General: No focal deficit present.      Mental Status: She is alert and oriented to person, place, and time. Mental status is at baseline.      Cranial Nerves: No cranial nerve deficit.      Motor: No weakness.   Psychiatric:         Mood and Affect: Mood normal.         Behavior: Behavior normal.         Laboratory:  Recent Labs     11/20/24  1131   WBC 10.7   RBC 5.24   HEMOGLOBIN 12.3   HEMATOCRIT 40.3   MCV 76.9*   MCH 23.5*   MCHC 30.5*   RDW 45.7   PLATELETCT 327   MPV 10.5     Recent Labs     11/20/24  1131   SODIUM 138   POTASSIUM 3.7   CHLORIDE 105   CO2 18*   GLUCOSE 111*   BUN 14   CREATININE 0.91   CALCIUM 9.4     Recent Labs     11/20/24  1131   ALTSGPT <5   ASTSGOT 19   ALKPHOSPHAT 68   TBILIRUBIN 0.5   GLUCOSE 111*     Recent Labs     11/20/24  1131   APTT 25.6   INR 0.94     No results for input(s): \"NTPROBNP\" in the last 72 hours.      Recent Labs    " " 11/20/24  1131   TROPONINT 36*       Imaging:  DX-CHEST-PORTABLE (1 VIEW)   Final Result      No acute cardiopulmonary disease evident.      CT-CTA NECK WITH & W/O-POST PROCESSING   Final Result      CT angiogram of the neck within normal limits.      CT-CTA HEAD WITH & W/O-POST PROCESS   Final Result      CT angiogram of the [place] within normal limits.      CT-CEREBRAL PERFUSION ANALYSIS   Final Result      1. Cerebral blood flow less than 30% possibly representing completed infarct = 0 mL.   2. T Max more than 6 seconds possibly representing combination of completed infarct and ischemia = 0 mL.   3. Mismatched volume possibly representing ischemic brain/penumbra= 0 mL      4.  Please note that this cerebral perfusion study and report is Quantitative and targets supratentorial (cerebral) perfusion for evaluation of large vessel territory acute ischemia/infarction. For example, lacunar infarcts, and brainstem/posterior fossa    ischemia/infarction are not evaluated on this study.  Data acquisition is subject to artifacts which can yield non-anatomically plausible perfusion maps which may be due to motion, bolus timing, signal to noise ratio, or other technical factors.    Perfusion map abnormalities which show non-anatomic distributions are likely artifact.   This study is not \"stand-alone\" and should only be utilized for diagnosis, management/treatment in correlation with CT, CTA, and/or MRI and clinical factors.             X-Ray:  I have personally reviewed the images and compared with prior images. and My impression is: No acute cardiopulmonary disease  EKG:  I have personally reviewed the images and compared with prior images. and My impression is: Sinus tachycardia at 108 bpm, no specific ST changes.    Assessment/Plan:  Justification for Admission Status  I anticipate this patient is appropriate for observation status at this time because hypertensive urgency, elevated troponin    Patient will " need a Telemetry bed on EMERGENCY service .  The need is secondary to hypertensive urgency, elevated troponin.    * Hypertensive urgency- (present on admission)  Assessment & Plan  Blurred vision, slurred speech, left leg weakness likely secondary to hypertensive urgency  Complicated migraine versus TIA also possible.  Consider MRI if symptoms come back  Restarted home blood pressure meds.  Monitor blood pressure carefully  Monitor on telemetry  Counseled about diet, exercise, medical compliance    Elevated troponin  Assessment & Plan  Denies any chest pain.  Likely secondary to demand ischemia from hypertensive urgency  Will continue to trend troponin  Manage blood pressure and monitor on telemetry as above    Class 1 obesity due to excess calories with serious comorbidity and body mass index (BMI) of 34.0 to 34.9 in adult- (present on admission)  Assessment & Plan  Body mass index is 34.06 kg/m².  Counseled about diet and exercise    Graves disease- (present on admission)  Assessment & Plan  History of Graves' disease  Not on any meds  Check TSH    Advance care planning- (present on admission)  Assessment & Plan  Discussed advance care planning with patient and family for 17 minutes.  Requested full code.        VTE prophylaxis: Lovenox

## 2024-11-20 NOTE — ED PROVIDER NOTES
ED Provider Note    CHIEF COMPLAINT  Chief Complaint   Patient presents with    Possible Stroke     Left lower leg weakness, stuttering speech, blurry vision. Hx of HTN. LKW 0900.        HPI/ROS    Melina Mcmahan is a 34 y.o. female who presents with blurred vision.  The patient states her symptoms started around 9:00.  She states initially 7 blurred vision on the left eye that then progressed to the right.  She did not have any diplopia.  However subsequently she developed difficulty with speech and some left lower leg weakness.  She does have a known history of dyslipidemia and hypertension.  She denies tobacco and drug abuse.  She does not have a headache.  She has not had any recent fevers, vomiting, nor diarrhea.  She has not had any palpitations, chest pain, nor difficulty with breathing.    PAST MEDICAL HISTORY   has a past medical history of Dyslipidemia (2023), Female cystocele (2015), Graves disease (2020), Hypertension ([id number]), and Thyrotoxicosis (2020).    SURGICAL HISTORY   has a past surgical history that includes other abdominal surgery; dilation and curettage (); primary c section (); and repeat c section w tubal ligation (2014).    FAMILY HISTORY  Family History   Problem Relation Age of Onset    Cancer Mother         unknown    Diabetes Father         diet and pills    Hypertension Father     Diabetes Sister         eldest sister, controlled with diet and pills    COPD Sister     Other Paternal Grandmother         kidney transplant       SOCIAL HISTORY  Social History     Tobacco Use    Smoking status: Former     Current packs/day: 0.00     Average packs/day: 0.3 packs/day for 3.0 years (0.8 ttl pk-yrs)     Types: Cigarettes     Start date: 3/19/2010     Quit date: 3/19/2013     Years since quittin.6    Smokeless tobacco: Never    Tobacco comments:     1 cig dialy    Vaping Use    Vaping status: Never Used   Substance and Sexual Activity    Alcohol  "use: No    Drug use: No    Sexual activity: Yes     Partners: Male     Birth control/protection: Female Sterilization     Comment: FOB involved and supportive.       CURRENT MEDICATIONS  Home Medications       Reviewed by Annette Whitfield R.N. (Registered Nurse) on 11/20/24 at 1115  Med List Status: Partial     Medication Last Dose Status   amLODIPine (NORVASC) 5 MG Tab  Active   Cholecalciferol (VITAMIN D3) 1.25 MG (57870 UT) Cap  Active   losartan (COZAAR) 50 MG Tab  Active   therapeutic multivitamin-minerals (THERAGRAN-M) Tab  Active                  Audit from Redirected Encounters    **Home medications have not yet been reviewed for this encounter**         ALLERGIES  Allergies   Allergen Reactions    Nkda [No Known Drug Allergy]        PHYSICAL EXAM  VITAL SIGNS: BP (!) 173/123   Pulse (!) 148   Temp 37.1 °C (98.8 °F) (Oral)   Resp 20   Ht 1.626 m (5' 4\")   Wt 92.5 kg (204 lb)   SpO2 100%   BMI 35.02 kg/m²    General The patient appears anxious and slightly tearful    HEENT unremarkable    Pulmonary the patient's lungs are clear to auscultation bilaterally    Cardiovascular S1-S2 with a tachycardic rate    GI abdomen soft    Skin no rashes, pallor, no jaundice    Extremities atraumatic    Neurologic examination the patient is alert and appropriate.  She does not have any visual deficits at this time.  She does follow commands and has no visual field deficits as mentioned above.  She does have some slight paralysis of the left side of her face but no drift to the upper extremities.  She does have a slight drift with the left lower extremity.  She also has some limb ataxia with the left lower extremity.  She is has normal sensation.  She has some mild aphasia.  NIH stroke scale of 4 please see the stroke sheet.    EKG/LABS  Results for orders placed or performed during the hospital encounter of 11/20/24   POCT glucose device results    Collection Time: 11/20/24 11:17 AM   Result Value Ref Range    POC " Glucose, Blood 104 (H) 65 - 99 mg/dL   CBC with Differential    Collection Time: 11/20/24 11:31 AM   Result Value Ref Range    WBC 10.7 4.8 - 10.8 K/uL    RBC 5.24 4.20 - 5.40 M/uL    Hemoglobin 12.3 12.0 - 16.0 g/dL    Hematocrit 40.3 37.0 - 47.0 %    MCV 76.9 (L) 81.4 - 97.8 fL    MCH 23.5 (L) 27.0 - 33.0 pg    MCHC 30.5 (L) 32.2 - 35.5 g/dL    RDW 45.7 35.9 - 50.0 fL    Platelet Count 327 164 - 446 K/uL    MPV 10.5 9.0 - 12.9 fL    Neutrophils-Polys 64.10 44.00 - 72.00 %    Lymphocytes 26.50 22.00 - 41.00 %    Monocytes 7.60 0.00 - 13.40 %    Eosinophils 0.80 0.00 - 6.90 %    Basophils 0.60 0.00 - 1.80 %    Immature Granulocytes 0.40 0.00 - 0.90 %    Nucleated RBC 0.00 0.00 - 0.20 /100 WBC    Neutrophils (Absolute) 6.86 1.82 - 7.42 K/uL    Lymphs (Absolute) 2.83 1.00 - 4.80 K/uL    Monos (Absolute) 0.81 0.00 - 0.85 K/uL    Eos (Absolute) 0.09 0.00 - 0.51 K/uL    Baso (Absolute) 0.06 0.00 - 0.12 K/uL    Immature Granulocytes (abs) 0.04 0.00 - 0.11 K/uL    NRBC (Absolute) 0.00 K/uL   Comp Metabolic Panel    Collection Time: 11/20/24 11:31 AM   Result Value Ref Range    Sodium 138 135 - 145 mmol/L    Potassium 3.7 3.6 - 5.5 mmol/L    Chloride 105 96 - 112 mmol/L    Co2 18 (L) 20 - 33 mmol/L    Anion Gap 15.0 7.0 - 16.0    Glucose 111 (H) 65 - 99 mg/dL    Bun 14 8 - 22 mg/dL    Creatinine 0.91 0.50 - 1.40 mg/dL    Calcium 9.4 8.5 - 10.5 mg/dL    Correct Calcium 8.8 8.5 - 10.5 mg/dL    AST(SGOT) 19 12 - 45 U/L    ALT(SGPT) <5 2 - 50 U/L    Alkaline Phosphatase 68 30 - 99 U/L    Total Bilirubin 0.5 0.1 - 1.5 mg/dL    Albumin 4.7 3.2 - 4.9 g/dL    Total Protein 8.3 (H) 6.0 - 8.2 g/dL    Globulin 3.6 (H) 1.9 - 3.5 g/dL    A-G Ratio 1.3 g/dL   COD (ADULT)    Collection Time: 11/20/24 11:31 AM   Result Value Ref Range    ABO Grouping Only A     Rh Grouping Only POS     Antibody Screen-Cod NEG    Prothrombin Time    Collection Time: 11/20/24 11:31 AM   Result Value Ref Range    PT 12.6 12.0 - 14.6 sec    INR 0.94 0.87 -  1.13   APTT    Collection Time: 24 11:31 AM   Result Value Ref Range    APTT 25.6 24.7 - 36.0 sec   TROPONIN    Collection Time: 24 11:31 AM   Result Value Ref Range    Troponin T 36 (H) 6 - 19 ng/L   ESTIMATED GFR    Collection Time: 24 11:31 AM   Result Value Ref Range    GFR (CKD-EPI) 85 >60 mL/min/1.73 m 2   EKG (NOW)    Collection Time: 24 12:09 PM   Result Value Ref Range    Report       [facility] Emergency Dept.    Test Date:  2024  Pt Name:    MADDY EFRREIRA                Department: ER  MRN:        [id number]                      Room:       [facility]  Gender:     Female                       Technician: [id number]  :        1990                   Requested By:SHARON CARROLL  Order #:    [id number]                    Reading MD: SHARON CARROLL MD    Measurements  Intervals                                Axis  Rate:       108                          P:          48  IL:         146                          QRS:        -2  QRSD:       102                          T:          34  QT:         337  QTc:        452    Interpretive Statements  Twelve-lead EKG shows a sinus tachycardia with a ventricular rate of 108, QRS  has poor R progression, no ST segment elevation or depression, normal T  waves.  Electronically Signed On 2024 12:19:05 PST by SHARON CARROLL MD         I have independently interpreted this EKG    RADIOLOGY/PROCEDURES     Radiologist interpretation:  DX-CHEST-PORTABLE (1 VIEW)   Final Result      No acute cardiopulmonary disease evident.      CT-CTA NECK WITH & W/O-POST PROCESSING   Final Result      CT angiogram of the neck within normal limits.      CT-CTA HEAD WITH & W/O-POST PROCESS   Final Result      CT angiogram of the [place] within normal limits.      CT-CEREBRAL PERFUSION ANALYSIS   Final Result      1. Cerebral blood flow less than 30% possibly representing completed infarct = 0 mL.   2. T Max more than 6 seconds  "possibly representing combination of completed infarct and ischemia = 0 mL.   3. Mismatched volume possibly representing ischemic brain/penumbra= 0 mL      4.  Please note that this cerebral perfusion study and report is Quantitative and targets supratentorial (cerebral) perfusion for evaluation of large vessel territory acute ischemia/infarction. For example, lacunar infarcts, and brainstem/posterior fossa    ischemia/infarction are not evaluated on this study.  Data acquisition is subject to artifacts which can yield non-anatomically plausible perfusion maps which may be due to motion, bolus timing, signal to noise ratio, or other technical factors.    Perfusion map abnormalities which show non-anatomic distributions are likely artifact.   This study is not \"stand-alone\" and should only be utilized for diagnosis, management/treatment in correlation with CT, CTA, and/or MRI and clinical factors.             COURSE & MEDICAL DECISION MAKING    1150 the patient returned to the bedside after CT imaging for possible CVA.  She has had significant improvement and the only deficit she has right now is a slight drift and dysfunction of the left lower extremity.  Her NIH stroke scale is now 1 and she is not a candidate for thrombolytics.    1255 the patient's symptoms have pretty much resolved.  Her blood pressure and pulse is still elevated.  Her blurred vision could have been from a hypertensive emergency and we will administer metoprolol to gradually lowering her blood pressure.  As for the other neurologic symptoms this could have been from a complex migraine.  I cannot rule out a TIA from a small vessel injury standpoint but suspect this to be less likely based on her age.  The patient has had significant improvement.  Her troponin is slightly elevated but her EKG does not show any evidence of ischemic change.  Again I suspect this is more from a hypertensive emergency standpoint.  The patient is resting comfortably at " the time of admission.  She will receive aspirin and I did order 5 mg of metoprolol intravenously as mentioned above.    FINAL DIAGNOSIS  1.  Blurred vision  2.  Dysarthria  3.  Left leg weakness  4.  Hypertensive emergency  5.  Critical care time 30 minutes    Disposition  The patient has stabilized and will be admitted to the hospitalist     Electronically signed by: Griffin Molina M.D., 11/20/2024 11:44 AM

## 2024-11-20 NOTE — ED NOTES
Break RN: Pt medicated per MAR. Pt resting with even chest rise and fall, reports no needs at this time, call light available and in reach.

## 2024-11-20 NOTE — ASSESSMENT & PLAN NOTE
Blurred vision, slurred speech, left leg weakness likely secondary to hypertensive urgency  Complicated migraine versus TIA also possible.  Consider MRI if symptoms come back  Restarted home blood pressure meds.  Monitor blood pressure carefully  Monitor on telemetry  Counseled about diet, exercise, medical compliance

## 2024-11-21 ENCOUNTER — PHARMACY VISIT (OUTPATIENT)
Dept: PHARMACY | Facility: MEDICAL CENTER | Age: 34
End: 2024-11-21
Payer: COMMERCIAL

## 2024-11-21 VITALS
WEIGHT: 198.41 LBS | BODY MASS INDEX: 33.87 KG/M2 | SYSTOLIC BLOOD PRESSURE: 153 MMHG | OXYGEN SATURATION: 99 % | HEIGHT: 64 IN | RESPIRATION RATE: 16 BRPM | DIASTOLIC BLOOD PRESSURE: 87 MMHG | TEMPERATURE: 98.6 F | HEART RATE: 91 BPM

## 2024-11-21 LAB
ALBUMIN SERPL BCP-MCNC: 4.2 G/DL (ref 3.2–4.9)
ALBUMIN/GLOB SERPL: 1.4 G/DL
ALP SERPL-CCNC: 59 U/L (ref 30–99)
ALT SERPL-CCNC: 5 U/L (ref 2–50)
ANION GAP SERPL CALC-SCNC: 11 MMOL/L (ref 7–16)
ANISOCYTOSIS BLD QL SMEAR: ABNORMAL
AST SERPL-CCNC: 14 U/L (ref 12–45)
BASOPHILS # BLD AUTO: 0.8 % (ref 0–1.8)
BASOPHILS # BLD: 0.05 K/UL (ref 0–0.12)
BILIRUB SERPL-MCNC: 0.6 MG/DL (ref 0.1–1.5)
BUN SERPL-MCNC: 12 MG/DL (ref 8–22)
CALCIUM ALBUM COR SERPL-MCNC: 8.9 MG/DL (ref 8.5–10.5)
CALCIUM SERPL-MCNC: 9.1 MG/DL (ref 8.5–10.5)
CHLORIDE SERPL-SCNC: 107 MMOL/L (ref 96–112)
CHOLEST SERPL-MCNC: 214 MG/DL (ref 100–199)
CO2 SERPL-SCNC: 21 MMOL/L (ref 20–33)
COMMENT 1642: NORMAL
CREAT SERPL-MCNC: 0.75 MG/DL (ref 0.5–1.4)
EOSINOPHIL # BLD AUTO: 0.2 K/UL (ref 0–0.51)
EOSINOPHIL NFR BLD: 3 % (ref 0–6.9)
ERYTHROCYTE [DISTWIDTH] IN BLOOD BY AUTOMATED COUNT: 46.3 FL (ref 35.9–50)
GFR SERPLBLD CREATININE-BSD FMLA CKD-EPI: 107 ML/MIN/1.73 M 2
GLOBULIN SER CALC-MCNC: 2.9 G/DL (ref 1.9–3.5)
GLUCOSE SERPL-MCNC: 100 MG/DL (ref 65–99)
HCT VFR BLD AUTO: 38.2 % (ref 37–47)
HDLC SERPL-MCNC: 51 MG/DL
HGB BLD-MCNC: 11.3 G/DL (ref 12–16)
IMM GRANULOCYTES # BLD AUTO: 0.02 K/UL (ref 0–0.11)
IMM GRANULOCYTES NFR BLD AUTO: 0.3 % (ref 0–0.9)
LDLC SERPL CALC-MCNC: 127 MG/DL
LYMPHOCYTES # BLD AUTO: 2.26 K/UL (ref 1–4.8)
LYMPHOCYTES NFR BLD: 34.3 % (ref 22–41)
MAGNESIUM SERPL-MCNC: 2.2 MG/DL (ref 1.5–2.5)
MCH RBC QN AUTO: 23 PG (ref 27–33)
MCHC RBC AUTO-ENTMCNC: 29.6 G/DL (ref 32.2–35.5)
MCV RBC AUTO: 77.6 FL (ref 81.4–97.8)
MICROCYTES BLD QL SMEAR: ABNORMAL
MONOCYTES # BLD AUTO: 0.59 K/UL (ref 0–0.85)
MONOCYTES NFR BLD AUTO: 9 % (ref 0–13.4)
MORPHOLOGY BLD-IMP: NORMAL
NEUTROPHILS # BLD AUTO: 3.47 K/UL (ref 1.82–7.42)
NEUTROPHILS NFR BLD: 52.6 % (ref 44–72)
NRBC # BLD AUTO: 0 K/UL
NRBC BLD-RTO: 0 /100 WBC (ref 0–0.2)
PHOSPHATE SERPL-MCNC: 3.4 MG/DL (ref 2.5–4.5)
PLATELET # BLD AUTO: 311 K/UL (ref 164–446)
PLATELET BLD QL SMEAR: NORMAL
PMV BLD AUTO: 10.6 FL (ref 9–12.9)
POTASSIUM SERPL-SCNC: 4 MMOL/L (ref 3.6–5.5)
PROT SERPL-MCNC: 7.1 G/DL (ref 6–8.2)
RBC # BLD AUTO: 4.92 M/UL (ref 4.2–5.4)
RBC BLD AUTO: PRESENT
SODIUM SERPL-SCNC: 139 MMOL/L (ref 135–145)
TRIGL SERPL-MCNC: 179 MG/DL (ref 0–149)
WBC # BLD AUTO: 6.6 K/UL (ref 4.8–10.8)

## 2024-11-21 PROCEDURE — 700102 HCHG RX REV CODE 250 W/ 637 OVERRIDE(OP): Performed by: INTERNAL MEDICINE

## 2024-11-21 PROCEDURE — 84100 ASSAY OF PHOSPHORUS: CPT

## 2024-11-21 PROCEDURE — A9270 NON-COVERED ITEM OR SERVICE: HCPCS | Performed by: INTERNAL MEDICINE

## 2024-11-21 PROCEDURE — 99239 HOSP IP/OBS DSCHRG MGMT >30: CPT | Performed by: INTERNAL MEDICINE

## 2024-11-21 PROCEDURE — 83735 ASSAY OF MAGNESIUM: CPT

## 2024-11-21 PROCEDURE — 85025 COMPLETE CBC W/AUTO DIFF WBC: CPT

## 2024-11-21 PROCEDURE — G0378 HOSPITAL OBSERVATION PER HR: HCPCS

## 2024-11-21 PROCEDURE — 80061 LIPID PANEL: CPT

## 2024-11-21 PROCEDURE — 80053 COMPREHEN METABOLIC PANEL: CPT

## 2024-11-21 PROCEDURE — RXMED WILLOW AMBULATORY MEDICATION CHARGE: Performed by: INTERNAL MEDICINE

## 2024-11-21 RX ORDER — LOSARTAN POTASSIUM 50 MG/1
100 TABLET ORAL NIGHTLY
Qty: 30 TABLET | Refills: 0 | Status: SHIPPED | OUTPATIENT
Start: 2024-11-21

## 2024-11-21 RX ORDER — ATORVASTATIN CALCIUM 40 MG/1
40 TABLET, FILM COATED ORAL DAILY
Status: DISCONTINUED | OUTPATIENT
Start: 2024-11-21 | End: 2024-11-21 | Stop reason: HOSPADM

## 2024-11-21 RX ORDER — ATORVASTATIN CALCIUM 40 MG/1
40 TABLET, FILM COATED ORAL NIGHTLY
Qty: 30 TABLET | Refills: 0 | Status: SHIPPED | OUTPATIENT
Start: 2024-11-21

## 2024-11-21 RX ORDER — AMLODIPINE BESYLATE 5 MG/1
10 TABLET ORAL DAILY
Qty: 30 TABLET | Refills: 0 | Status: SHIPPED | OUTPATIENT
Start: 2024-11-21

## 2024-11-21 RX ADMIN — ATORVASTATIN CALCIUM 40 MG: 40 TABLET, FILM COATED ORAL at 08:27

## 2024-11-21 RX ADMIN — ASPIRIN 81 MG: 81 TABLET, COATED ORAL at 05:19

## 2024-11-21 RX ADMIN — CHOLECALCIFEROL TAB 125 MCG (5000 UNIT) 5000 UNITS: 125 TAB at 05:20

## 2024-11-21 NOTE — DISCHARGE SUMMARY
Discharge Summary    CHIEF COMPLAINT ON ADMISSION  Chief Complaint   Patient presents with    Possible Stroke     Left lower leg weakness, stuttering speech, blurry vision. Hx of HTN. LKW 0900.        Reason for Admission  Poss Stroke     Admission Date  11/20/2024    CODE STATUS  Full Code    HPI & HOSPITAL COURSE  Melina Mcmahan is a 34 y.o. female with dyslipidemia, hypertension, Graves' disease, admitted 11/20/2024 with bilateral blurred vision, left lower leg weakness, and slurred speech with word finding difficulty.  She had erratic compliance with her antihypertensives.  On evaluation, she was notably hypertensive in the 170s.  Initial blood workup showed no leukocytosis, with normal electrolytes and renal function.  Troponin was 36.  Chest x-ray showed nothing acute.  Perfusion head CT did not show any mismatch.  CT of the head and neck did not show any LVO or hemodynamically significant stenosis.  She was felt to have hypertensive urgency and was restarted on home blood pressure medications.  Further workup was pursued. HbA1c 5.6.  Lipid profile showed LDL of 127, HDL 51, triglyceride of 179, total cholesterol of 214.  Troponins remained flat at 36 and 24.  TSH was normal.  She maintained sinus rhythm on telemetry monitoring.    Her blood pressure trend improved.  Her presenting symptoms have resolved.  She had improved sense of wellbeing.  She was counseled extensively on medication compliance and lifestyle modification for cardiac risk reduction.  I have personally seen and examined the patient on the day of discharge. With her clinical improvement, she was deemed ready to discharge from the hospital as she did not have any further hospitalization needs. Patient felt comfortable going home. The discharge plan was discussed with the patient, with which she was agreeable to.     Therefore, she is discharged in good and stable condition to home with close outpatient follow-up.      Discharge  Date  11/21/2024      FOLLOW UP ITEMS POST DISCHARGE  -She will continue on Norvasc 10 mg daily in the morning and losartan 100 mg at night.  -She will continue on Lipitor for hyperlipidemia.  -She is counseled to monitor her blood pressure trend at home and to keep a blood pressure log to bring to her next PCP appointment.  -Follow-up with PCP.  - counseled to seek immediate medical attention, or return to the ED for recurrent or worsening symptoms.      DISCHARGE DIAGNOSES  Principal Problem:    Hypertensive urgency (POA: Yes)  Active Problems:    Elevated troponin likely demand ischemia (POA: Yes)    Advance care planning (POA: Yes)    Graves disease (POA: Yes)    Class 1 obesity due to excess calories with serious comorbidity and body mass index (BMI) of 34.0 to 34.9 in adult (POA: Yes)    Dyslipidemia (POA: Yes)  Resolved Problems:    * No resolved hospital problems. *      FOLLOW UP  Future Appointments   Date Time Provider Department Center   12/19/2024  9:20 AM Dennis Pride D.O. [facility]     Dennis Pride D.O.  [address] [address] [address]  [phone]    Schedule an appointment as soon as possible for a visit  Hospital follow-up      MEDICATIONS ON DISCHARGE     Medication List        START taking these medications        Instructions   atorvastatin 40 MG Tabs  Commonly known as: Lipitor   Take 1 Tablet by mouth every evening.  Dose: 40 mg            CHANGE how you take these medications        Instructions   amLODIPine 5 MG Tabs  What changed:   how much to take  when to take this  Commonly known as: Norvasc   Take 2 Tablets by mouth every day.  Dose: 10 mg     losartan 50 MG Tabs  What changed:   how much to take  when to take this  Commonly known as: Cozaar   Take 2 Tablets by mouth every evening.  Dose: 100 mg            CONTINUE taking these medications        Instructions   VITAMIN D3 PO   Take 1 Tablet by mouth every day.  Dose: 1 Tablet              Allergies  No  Known Allergies    DIET  Orders Placed This Encounter   Procedures    Diet Order Diet: Cardiac     Standing Status:   Standing     Number of Occurrences:   1     Order Specific Question:   Diet:     Answer:   Cardiac [6]       ACTIVITY  As tolerated.  Weight bearing as tolerated    CONSULTATIONS  None    PROCEDURES  None    LABORATORY  Lab Results   Component Value Date    SODIUM 139 11/21/2024    POTASSIUM 4.0 11/21/2024    CHLORIDE 107 11/21/2024    CO2 21 11/21/2024    GLUCOSE 100 (H) 11/21/2024    BUN 12 11/21/2024    CREATININE 0.75 11/21/2024    CREATININE 0.7 08/06/2006        Lab Results   Component Value Date    WBC 6.6 11/21/2024    HEMOGLOBIN 11.3 (L) 11/21/2024    HEMATOCRIT 38.2 11/21/2024    PLATELETCT 311 11/21/2024        Total time of the discharge process = 38 minutes.

## 2024-11-21 NOTE — CARE PLAN
The patient is Stable - Low risk of patient condition declining or worsening    Shift Goals  Clinical Goals: BP control, encourage rest  Patient Goals: to go home  Family Goals: RAHAT    Progress made toward(s) clinical / shift goals:    Problem: Communication  Goal: The ability to communicate needs accurately and effectively will improve  Outcome: Progressing     Problem: Safety  Goal: Will remain free from injury  Outcome: Progressing     Problem: Discharge Barriers/Planning  Goal: Patient's continuum of care needs will be met  Outcome: Progressing       Patient is not progressing towards the following goals:

## 2024-11-21 NOTE — PROGRESS NOTES
Discharge orders received.  Patient arrived to the discharge lounge.  PIV removed by discharge RN. Meds to beds medications verified by discharge RN, bag of medications given to patient.  Instructions given, medications reviewed and general discharge education provided to patient.  Follow up appointments discussed.  Patient verbalized understanding of dc instructions and prescriptions.  Patient signed discharge instructions.  Patient verbalized she had all belongings with her, Denied having any home medications locked in our inpatient pharmacy that  she needs back. Patient ambulated with steady gait from discharge lounge to private vehicle. Patient left via car with spouse to home in stable condition.

## 2024-11-21 NOTE — PROGRESS NOTES
Monitor Summary:  Rhythm: NSR  Rate: 68-76  Ectopy: rare PVCs    0.17/0.10/0.43    Per monitor tech interpretation

## 2024-11-21 NOTE — PROGRESS NOTES
Transport picked up pt for dc lounge. Her  is with her. All belongings accounted for and no s/s of distress noted.

## 2024-11-21 NOTE — CARE PLAN
The patient is Stable - Low risk of patient condition declining or worsening    Shift Goals  Clinical Goals: bp control, neuro checks, safety  Patient Goals: go home  Family Goals: no family at bedside    Progress made toward(s) clinical / shift goals:    Problem: Knowledge Deficit - Standard  Goal: Patient and family/care givers will demonstrate understanding of plan of care, disease process/condition, diagnostic tests and medications  Outcome: Progressing     Problem: Communication  Goal: The ability to communicate needs accurately and effectively will improve  Outcome: Progressing     Problem: Safety  Goal: Will remain free from injury  Outcome: Progressing  Goal: Will remain free from falls  Outcome: Progressing     Problem: Pain Management  Goal: Pain level will decrease to patient's comfort goal  Outcome: Progressing     Problem: Infection  Goal: Will remain free from infection  Outcome: Progressing     Problem: Venous Thromboembolism (VTW)/Deep Vein Thrombosis (DVT) Prevention:  Goal: Patient will participate in Venous Thrombosis (VTE)/Deep Vein Thrombosis (DVT)Prevention Measures  Outcome: Progressing     Problem: Psychosocial Needs:  Goal: Level of anxiety will decrease  Outcome: Progressing     Problem: Fluid Volume:  Goal: Will maintain balanced intake and output  Outcome: Progressing     Problem: Respiratory:  Goal: Respiratory status will improve  Outcome: Progressing     Problem: Bowel/Gastric:  Goal: Normal bowel function is maintained or improved  Outcome: Progressing  Goal: Will not experience complications related to bowel motility  Outcome: Progressing     Problem: Urinary Elimination:  Goal: Ability to reestablish a normal urinary elimination pattern will improve  Outcome: Progressing     Problem: Skin Integrity  Goal: Risk for impaired skin integrity will decrease  Outcome: Progressing     Problem: Mobility  Goal: Risk for activity intolerance will decrease  Outcome: Progressing     Problem:  Medication  Goal: Compliance with prescribed medication will improve  Outcome: Progressing     Problem: Knowledge Deficit  Goal: Knowledge of disease process/condition, treatment plan, diagnostic tests, and medications will improve  Outcome: Progressing  Goal: Knowledge of the prescribed therapeutic regimen will improve  Outcome: Progressing     Problem: Discharge Barriers/Planning  Goal: Patient's continuum of care needs will be met  Outcome: Progressing       Patient is not progressing towards the following goals:

## 2024-12-07 ENCOUNTER — APPOINTMENT (OUTPATIENT)
Dept: RADIOLOGY | Facility: MEDICAL CENTER | Age: 34
DRG: 309 | End: 2024-12-07
Attending: EMERGENCY MEDICINE
Payer: COMMERCIAL

## 2024-12-07 ENCOUNTER — HOSPITAL ENCOUNTER (INPATIENT)
Facility: MEDICAL CENTER | Age: 34
LOS: 1 days | DRG: 309 | End: 2024-12-08
Attending: EMERGENCY MEDICINE | Admitting: STUDENT IN AN ORGANIZED HEALTH CARE EDUCATION/TRAINING PROGRAM
Payer: COMMERCIAL

## 2024-12-07 DIAGNOSIS — L83 ACANTHOSIS NIGRICANS: ICD-10-CM

## 2024-12-07 DIAGNOSIS — E55.9 VITAMIN D DEFICIENCY: ICD-10-CM

## 2024-12-07 DIAGNOSIS — E87.20 LACTIC ACIDOSIS: ICD-10-CM

## 2024-12-07 DIAGNOSIS — E87.6 HYPOKALEMIA: ICD-10-CM

## 2024-12-07 DIAGNOSIS — I47.10 SVT (SUPRAVENTRICULAR TACHYCARDIA) (HCC): ICD-10-CM

## 2024-12-07 PROBLEM — E87.29 HIGH ANION GAP METABOLIC ACIDOSIS: Status: ACTIVE | Noted: 2024-12-07

## 2024-12-07 LAB
ALBUMIN SERPL BCP-MCNC: 4.6 G/DL (ref 3.2–4.9)
ALBUMIN/GLOB SERPL: 1.4 G/DL
ALP SERPL-CCNC: 66 U/L (ref 30–99)
ALT SERPL-CCNC: 8 U/L (ref 2–50)
AMPHET UR QL SCN: NEGATIVE
ANION GAP SERPL CALC-SCNC: 14 MMOL/L (ref 7–16)
AST SERPL-CCNC: 25 U/L (ref 12–45)
B-OH-BUTYR SERPL-MCNC: 0.17 MMOL/L (ref 0.02–0.27)
BARBITURATES UR QL SCN: NEGATIVE
BASOPHILS # BLD AUTO: 0.5 % (ref 0–1.8)
BASOPHILS # BLD: 0.06 K/UL (ref 0–0.12)
BENZODIAZ UR QL SCN: NEGATIVE
BILIRUB SERPL-MCNC: 0.4 MG/DL (ref 0.1–1.5)
BUN SERPL-MCNC: 10 MG/DL (ref 8–22)
BZE UR QL SCN: NEGATIVE
CALCIUM ALBUM COR SERPL-MCNC: 9 MG/DL (ref 8.5–10.5)
CALCIUM SERPL-MCNC: 9.5 MG/DL (ref 8.5–10.5)
CANNABINOIDS UR QL SCN: NEGATIVE
CHLORIDE SERPL-SCNC: 106 MMOL/L (ref 96–112)
CO2 SERPL-SCNC: 19 MMOL/L (ref 20–33)
CREAT SERPL-MCNC: 0.66 MG/DL (ref 0.5–1.4)
EKG IMPRESSION: NORMAL
EOSINOPHIL # BLD AUTO: 0.22 K/UL (ref 0–0.51)
EOSINOPHIL NFR BLD: 1.9 % (ref 0–6.9)
ERYTHROCYTE [DISTWIDTH] IN BLOOD BY AUTOMATED COUNT: 42.9 FL (ref 35.9–50)
ETHANOL BLD-MCNC: <10.1 MG/DL
FENTANYL UR QL: NEGATIVE
FLUAV RNA SPEC QL NAA+PROBE: NEGATIVE
FLUBV RNA SPEC QL NAA+PROBE: NEGATIVE
GFR SERPLBLD CREATININE-BSD FMLA CKD-EPI: 118 ML/MIN/1.73 M 2
GLOBULIN SER CALC-MCNC: 3.4 G/DL (ref 1.9–3.5)
GLUCOSE SERPL-MCNC: 127 MG/DL (ref 65–99)
HCG SERPL QL: NEGATIVE
HCT VFR BLD AUTO: 39.5 % (ref 37–47)
HGB BLD-MCNC: 12.4 G/DL (ref 12–16)
IMM GRANULOCYTES # BLD AUTO: 0.03 K/UL (ref 0–0.11)
IMM GRANULOCYTES NFR BLD AUTO: 0.3 % (ref 0–0.9)
LACTATE SERPL-SCNC: 3.5 MMOL/L (ref 0.5–2)
LIPASE SERPL-CCNC: 33 U/L (ref 11–82)
LYMPHOCYTES # BLD AUTO: 3.93 K/UL (ref 1–4.8)
LYMPHOCYTES NFR BLD: 33.1 % (ref 22–41)
MAGNESIUM SERPL-MCNC: 2.2 MG/DL (ref 1.5–2.5)
MCH RBC QN AUTO: 23.8 PG (ref 27–33)
MCHC RBC AUTO-ENTMCNC: 31.4 G/DL (ref 32.2–35.5)
MCV RBC AUTO: 75.8 FL (ref 81.4–97.8)
METHADONE UR QL SCN: NEGATIVE
MONOCYTES # BLD AUTO: 1.07 K/UL (ref 0–0.85)
MONOCYTES NFR BLD AUTO: 9 % (ref 0–13.4)
NEUTROPHILS # BLD AUTO: 6.57 K/UL (ref 1.82–7.42)
NEUTROPHILS NFR BLD: 55.2 % (ref 44–72)
NRBC # BLD AUTO: 0 K/UL
NRBC BLD-RTO: 0 /100 WBC (ref 0–0.2)
NT-PROBNP SERPL IA-MCNC: <36 PG/ML (ref 0–125)
OPIATES UR QL SCN: NEGATIVE
OXYCODONE UR QL SCN: NEGATIVE
PCP UR QL SCN: NEGATIVE
PLATELET # BLD AUTO: 362 K/UL (ref 164–446)
PMV BLD AUTO: 10.4 FL (ref 9–12.9)
POTASSIUM SERPL-SCNC: 3.4 MMOL/L (ref 3.6–5.5)
PROPOXYPH UR QL SCN: NEGATIVE
PROT SERPL-MCNC: 8 G/DL (ref 6–8.2)
RBC # BLD AUTO: 5.21 M/UL (ref 4.2–5.4)
RSV RNA SPEC QL NAA+PROBE: NEGATIVE
SARS-COV-2 RNA RESP QL NAA+PROBE: NOTDETECTED
SODIUM SERPL-SCNC: 139 MMOL/L (ref 135–145)
SPECIMEN SOURCE: NORMAL
T3FREE SERPL-MCNC: 3.3 PG/ML (ref 2–4.4)
T4 FREE SERPL-MCNC: 1.22 NG/DL (ref 0.93–1.7)
TROPONIN T SERPL-MCNC: 7 NG/L (ref 6–19)
TSH SERPL-ACNC: 3.68 UIU/ML (ref 0.35–5.5)
WBC # BLD AUTO: 11.9 K/UL (ref 4.8–10.8)

## 2024-12-07 PROCEDURE — 99446 NTRPROF PH1/NTRNET/EHR 5-10: CPT | Performed by: INTERNAL MEDICINE

## 2024-12-07 PROCEDURE — 85025 COMPLETE CBC W/AUTO DIFF WBC: CPT

## 2024-12-07 PROCEDURE — 93005 ELECTROCARDIOGRAM TRACING: CPT | Mod: TC | Performed by: EMERGENCY MEDICINE

## 2024-12-07 PROCEDURE — 82010 KETONE BODYS QUAN: CPT

## 2024-12-07 PROCEDURE — 84703 CHORIONIC GONADOTROPIN ASSAY: CPT

## 2024-12-07 PROCEDURE — 83690 ASSAY OF LIPASE: CPT

## 2024-12-07 PROCEDURE — 82077 ASSAY SPEC XCP UR&BREATH IA: CPT

## 2024-12-07 PROCEDURE — 99285 EMERGENCY DEPT VISIT HI MDM: CPT

## 2024-12-07 PROCEDURE — A9270 NON-COVERED ITEM OR SERVICE: HCPCS | Mod: JZ | Performed by: EMERGENCY MEDICINE

## 2024-12-07 PROCEDURE — 71275 CT ANGIOGRAPHY CHEST: CPT

## 2024-12-07 PROCEDURE — A9270 NON-COVERED ITEM OR SERVICE: HCPCS | Mod: JZ | Performed by: STUDENT IN AN ORGANIZED HEALTH CARE EDUCATION/TRAINING PROGRAM

## 2024-12-07 PROCEDURE — 700102 HCHG RX REV CODE 250 W/ 637 OVERRIDE(OP): Mod: JZ | Performed by: EMERGENCY MEDICINE

## 2024-12-07 PROCEDURE — 96374 THER/PROPH/DIAG INJ IV PUSH: CPT

## 2024-12-07 PROCEDURE — 700105 HCHG RX REV CODE 258: Performed by: EMERGENCY MEDICINE

## 2024-12-07 PROCEDURE — 0241U HCHG SARS-COV-2 COVID-19 NFCT DS RESP RNA 4 TRGT MIC: CPT

## 2024-12-07 PROCEDURE — 96375 TX/PRO/DX INJ NEW DRUG ADDON: CPT

## 2024-12-07 PROCEDURE — 84443 ASSAY THYROID STIM HORMONE: CPT

## 2024-12-07 PROCEDURE — 83735 ASSAY OF MAGNESIUM: CPT

## 2024-12-07 PROCEDURE — 700101 HCHG RX REV CODE 250: Performed by: EMERGENCY MEDICINE

## 2024-12-07 PROCEDURE — 99223 1ST HOSP IP/OBS HIGH 75: CPT | Performed by: STUDENT IN AN ORGANIZED HEALTH CARE EDUCATION/TRAINING PROGRAM

## 2024-12-07 PROCEDURE — 80053 COMPREHEN METABOLIC PANEL: CPT

## 2024-12-07 PROCEDURE — 84484 ASSAY OF TROPONIN QUANT: CPT

## 2024-12-07 PROCEDURE — 83880 ASSAY OF NATRIURETIC PEPTIDE: CPT

## 2024-12-07 PROCEDURE — 83605 ASSAY OF LACTIC ACID: CPT

## 2024-12-07 PROCEDURE — 36415 COLL VENOUS BLD VENIPUNCTURE: CPT

## 2024-12-07 PROCEDURE — 770020 HCHG ROOM/CARE - TELE (206)

## 2024-12-07 PROCEDURE — 700102 HCHG RX REV CODE 250 W/ 637 OVERRIDE(OP): Mod: JZ | Performed by: STUDENT IN AN ORGANIZED HEALTH CARE EDUCATION/TRAINING PROGRAM

## 2024-12-07 PROCEDURE — 700117 HCHG RX CONTRAST REV CODE 255: Performed by: EMERGENCY MEDICINE

## 2024-12-07 PROCEDURE — 80307 DRUG TEST PRSMV CHEM ANLYZR: CPT

## 2024-12-07 PROCEDURE — 84439 ASSAY OF FREE THYROXINE: CPT

## 2024-12-07 PROCEDURE — 700111 HCHG RX REV CODE 636 W/ 250 OVERRIDE (IP): Mod: JZ | Performed by: STUDENT IN AN ORGANIZED HEALTH CARE EDUCATION/TRAINING PROGRAM

## 2024-12-07 PROCEDURE — 700111 HCHG RX REV CODE 636 W/ 250 OVERRIDE (IP): Mod: JZ | Performed by: EMERGENCY MEDICINE

## 2024-12-07 PROCEDURE — 84481 FREE ASSAY (FT-3): CPT

## 2024-12-07 RX ORDER — ATORVASTATIN CALCIUM 40 MG/1
40 TABLET, FILM COATED ORAL NIGHTLY
Status: DISCONTINUED | OUTPATIENT
Start: 2024-12-07 | End: 2024-12-08 | Stop reason: HOSPADM

## 2024-12-07 RX ORDER — AMOXICILLIN 250 MG
2 CAPSULE ORAL EVERY EVENING
Status: DISCONTINUED | OUTPATIENT
Start: 2024-12-07 | End: 2024-12-08 | Stop reason: HOSPADM

## 2024-12-07 RX ORDER — PROMETHAZINE HYDROCHLORIDE 25 MG/1
12.5-25 TABLET ORAL EVERY 4 HOURS PRN
Status: DISCONTINUED | OUTPATIENT
Start: 2024-12-07 | End: 2024-12-08 | Stop reason: HOSPADM

## 2024-12-07 RX ORDER — POTASSIUM CHLORIDE 1500 MG/1
20 TABLET, EXTENDED RELEASE ORAL ONCE
Status: COMPLETED | OUTPATIENT
Start: 2024-12-07 | End: 2024-12-07

## 2024-12-07 RX ORDER — METOPROLOL TARTRATE 1 MG/ML
5 INJECTION, SOLUTION INTRAVENOUS ONCE
Status: COMPLETED | OUTPATIENT
Start: 2024-12-07 | End: 2024-12-07

## 2024-12-07 RX ORDER — POLYETHYLENE GLYCOL 3350 17 G/17G
1 POWDER, FOR SOLUTION ORAL
Status: DISCONTINUED | OUTPATIENT
Start: 2024-12-07 | End: 2024-12-08 | Stop reason: HOSPADM

## 2024-12-07 RX ORDER — ONDANSETRON 2 MG/ML
4 INJECTION INTRAMUSCULAR; INTRAVENOUS EVERY 4 HOURS PRN
Status: DISCONTINUED | OUTPATIENT
Start: 2024-12-07 | End: 2024-12-08 | Stop reason: HOSPADM

## 2024-12-07 RX ORDER — ADENOSINE 3 MG/ML
6 INJECTION, SOLUTION INTRAVENOUS ONCE
Status: DISCONTINUED | OUTPATIENT
Start: 2024-12-07 | End: 2024-12-07

## 2024-12-07 RX ORDER — PROCHLORPERAZINE EDISYLATE 5 MG/ML
5-10 INJECTION INTRAMUSCULAR; INTRAVENOUS EVERY 4 HOURS PRN
Status: DISCONTINUED | OUTPATIENT
Start: 2024-12-07 | End: 2024-12-08 | Stop reason: HOSPADM

## 2024-12-07 RX ORDER — LOSARTAN POTASSIUM 50 MG/1
100 TABLET ORAL NIGHTLY
Status: DISCONTINUED | OUTPATIENT
Start: 2024-12-07 | End: 2024-12-08 | Stop reason: HOSPADM

## 2024-12-07 RX ORDER — ENOXAPARIN SODIUM 100 MG/ML
40 INJECTION SUBCUTANEOUS DAILY
Status: DISCONTINUED | OUTPATIENT
Start: 2024-12-07 | End: 2024-12-08 | Stop reason: HOSPADM

## 2024-12-07 RX ORDER — ONDANSETRON 4 MG/1
4 TABLET, ORALLY DISINTEGRATING ORAL EVERY 4 HOURS PRN
Status: DISCONTINUED | OUTPATIENT
Start: 2024-12-07 | End: 2024-12-08 | Stop reason: HOSPADM

## 2024-12-07 RX ORDER — AMLODIPINE BESYLATE 10 MG/1
10 TABLET ORAL DAILY
Status: DISCONTINUED | OUTPATIENT
Start: 2024-12-08 | End: 2024-12-08 | Stop reason: HOSPADM

## 2024-12-07 RX ORDER — PROMETHAZINE HYDROCHLORIDE 25 MG/1
12.5-25 SUPPOSITORY RECTAL EVERY 4 HOURS PRN
Status: DISCONTINUED | OUTPATIENT
Start: 2024-12-07 | End: 2024-12-08 | Stop reason: HOSPADM

## 2024-12-07 RX ORDER — LABETALOL HYDROCHLORIDE 5 MG/ML
10 INJECTION, SOLUTION INTRAVENOUS EVERY 4 HOURS PRN
Status: DISCONTINUED | OUTPATIENT
Start: 2024-12-07 | End: 2024-12-08 | Stop reason: HOSPADM

## 2024-12-07 RX ORDER — POTASSIUM CHLORIDE 1500 MG/1
40 TABLET, EXTENDED RELEASE ORAL ONCE
Status: COMPLETED | OUTPATIENT
Start: 2024-12-07 | End: 2024-12-07

## 2024-12-07 RX ORDER — ACETAMINOPHEN 325 MG/1
650 TABLET ORAL EVERY 6 HOURS PRN
Status: DISCONTINUED | OUTPATIENT
Start: 2024-12-07 | End: 2024-12-08 | Stop reason: HOSPADM

## 2024-12-07 RX ORDER — SODIUM CHLORIDE 9 MG/ML
1000 INJECTION, SOLUTION INTRAVENOUS ONCE
Status: COMPLETED | OUTPATIENT
Start: 2024-12-07 | End: 2024-12-07

## 2024-12-07 RX ORDER — ADENOSINE 3 MG/ML
12 INJECTION, SOLUTION INTRAVENOUS ONCE
Status: COMPLETED | OUTPATIENT
Start: 2024-12-07 | End: 2024-12-07

## 2024-12-07 RX ORDER — ADENOSINE 3 MG/ML
6 INJECTION, SOLUTION INTRAVENOUS ONCE
Status: COMPLETED | OUTPATIENT
Start: 2024-12-07 | End: 2024-12-07

## 2024-12-07 RX ORDER — SODIUM CHLORIDE 9 MG/ML
1000 INJECTION, SOLUTION INTRAVENOUS ONCE
Status: DISCONTINUED | OUTPATIENT
Start: 2024-12-07 | End: 2024-12-07 | Stop reason: CLARIF

## 2024-12-07 RX ADMIN — LOSARTAN POTASSIUM 100 MG: 50 TABLET, FILM COATED ORAL at 22:13

## 2024-12-07 RX ADMIN — ADENOSINE 6 MG: 3 INJECTION INTRAVENOUS at 18:09

## 2024-12-07 RX ADMIN — METOPROLOL TARTRATE 5 MG: 5 INJECTION INTRAVENOUS at 18:16

## 2024-12-07 RX ADMIN — POTASSIUM CHLORIDE 20 MEQ: 1500 TABLET, EXTENDED RELEASE ORAL at 23:49

## 2024-12-07 RX ADMIN — POTASSIUM CHLORIDE 40 MEQ: 1500 TABLET, EXTENDED RELEASE ORAL at 20:18

## 2024-12-07 RX ADMIN — SENNOSIDES AND DOCUSATE SODIUM 2 TABLET: 50; 8.6 TABLET ORAL at 22:14

## 2024-12-07 RX ADMIN — SODIUM CHLORIDE 1000 ML: 9 INJECTION, SOLUTION INTRAVENOUS at 18:07

## 2024-12-07 RX ADMIN — IOHEXOL 80 ML: 350 INJECTION, SOLUTION INTRAVENOUS at 19:30

## 2024-12-07 RX ADMIN — ATORVASTATIN CALCIUM 40 MG: 40 TABLET, FILM COATED ORAL at 22:14

## 2024-12-07 RX ADMIN — ADENOSINE 12 MG: 3 INJECTION INTRAVENOUS at 18:11

## 2024-12-07 RX ADMIN — ENOXAPARIN SODIUM 40 MG: 100 INJECTION SUBCUTANEOUS at 22:15

## 2024-12-07 ASSESSMENT — COGNITIVE AND FUNCTIONAL STATUS - GENERAL
SUGGESTED CMS G CODE MODIFIER MOBILITY: CH
SUGGESTED CMS G CODE MODIFIER DAILY ACTIVITY: CH
MOBILITY SCORE: 24
DAILY ACTIVITIY SCORE: 24

## 2024-12-07 ASSESSMENT — LIFESTYLE VARIABLES
ALCOHOL_USE: NO
TOTAL SCORE: 0
EVER HAD A DRINK FIRST THING IN THE MORNING TO STEADY YOUR NERVES TO GET RID OF A HANGOVER: NO
CONSUMPTION TOTAL: NEGATIVE
AVERAGE NUMBER OF DAYS PER WEEK YOU HAVE A DRINK CONTAINING ALCOHOL: 0
TOTAL SCORE: 0
HAVE PEOPLE ANNOYED YOU BY CRITICIZING YOUR DRINKING: NO
ON A TYPICAL DAY WHEN YOU DRINK ALCOHOL HOW MANY DRINKS DO YOU HAVE: 0
TOTAL SCORE: 0
EVER FELT BAD OR GUILTY ABOUT YOUR DRINKING: NO
HAVE YOU EVER FELT YOU SHOULD CUT DOWN ON YOUR DRINKING: NO
HOW MANY TIMES IN THE PAST YEAR HAVE YOU HAD 5 OR MORE DRINKS IN A DAY: 0

## 2024-12-07 ASSESSMENT — ENCOUNTER SYMPTOMS
COUGH: 0
NECK PAIN: 0
BACK PAIN: 0
BLURRED VISION: 0
ABDOMINAL PAIN: 0
VOMITING: 0
SHORTNESS OF BREATH: 0
EYE PAIN: 0
NAUSEA: 0
WHEEZING: 0
DIZZINESS: 0
DIARRHEA: 0
CHILLS: 0
BLOOD IN STOOL: 0
CONSTIPATION: 0
PALPITATIONS: 1
FEVER: 0
HEADACHES: 0
DOUBLE VISION: 0

## 2024-12-07 ASSESSMENT — SOCIAL DETERMINANTS OF HEALTH (SDOH)

## 2024-12-07 ASSESSMENT — FIBROSIS 4 INDEX
FIB4 SCORE: 0.83
FIB4 SCORE: 0.68

## 2024-12-08 ENCOUNTER — PHARMACY VISIT (OUTPATIENT)
Dept: PHARMACY | Facility: MEDICAL CENTER | Age: 34
End: 2024-12-08
Payer: COMMERCIAL

## 2024-12-08 ENCOUNTER — APPOINTMENT (OUTPATIENT)
Dept: CARDIOLOGY | Facility: MEDICAL CENTER | Age: 34
DRG: 309 | End: 2024-12-08
Attending: STUDENT IN AN ORGANIZED HEALTH CARE EDUCATION/TRAINING PROGRAM
Payer: COMMERCIAL

## 2024-12-08 VITALS
BODY MASS INDEX: 33.84 KG/M2 | SYSTOLIC BLOOD PRESSURE: 133 MMHG | OXYGEN SATURATION: 99 % | HEART RATE: 104 BPM | RESPIRATION RATE: 18 BRPM | TEMPERATURE: 97.2 F | HEIGHT: 64 IN | WEIGHT: 198.19 LBS | DIASTOLIC BLOOD PRESSURE: 85 MMHG

## 2024-12-08 LAB
ALBUMIN SERPL BCP-MCNC: 4.2 G/DL (ref 3.2–4.9)
ALBUMIN/GLOB SERPL: 1.6 G/DL
ALP SERPL-CCNC: 57 U/L (ref 30–99)
ALT SERPL-CCNC: 6 U/L (ref 2–50)
ANION GAP SERPL CALC-SCNC: 14 MMOL/L (ref 7–16)
AST SERPL-CCNC: 11 U/L (ref 12–45)
BASOPHILS # BLD AUTO: 0.7 % (ref 0–1.8)
BASOPHILS # BLD: 0.07 K/UL (ref 0–0.12)
BILIRUB SERPL-MCNC: 0.4 MG/DL (ref 0.1–1.5)
BUN SERPL-MCNC: 8 MG/DL (ref 8–22)
CALCIUM ALBUM COR SERPL-MCNC: 8.5 MG/DL (ref 8.5–10.5)
CALCIUM SERPL-MCNC: 8.7 MG/DL (ref 8.5–10.5)
CHLORIDE SERPL-SCNC: 107 MMOL/L (ref 96–112)
CO2 SERPL-SCNC: 19 MMOL/L (ref 20–33)
CREAT SERPL-MCNC: 0.54 MG/DL (ref 0.5–1.4)
EKG IMPRESSION: NORMAL
EOSINOPHIL # BLD AUTO: 0.12 K/UL (ref 0–0.51)
EOSINOPHIL NFR BLD: 1.1 % (ref 0–6.9)
ERYTHROCYTE [DISTWIDTH] IN BLOOD BY AUTOMATED COUNT: 43.7 FL (ref 35.9–50)
GFR SERPLBLD CREATININE-BSD FMLA CKD-EPI: 123 ML/MIN/1.73 M 2
GLOBULIN SER CALC-MCNC: 2.7 G/DL (ref 1.9–3.5)
GLUCOSE SERPL-MCNC: 145 MG/DL (ref 65–99)
HCT VFR BLD AUTO: 34.9 % (ref 37–47)
HGB BLD-MCNC: 10.9 G/DL (ref 12–16)
IMM GRANULOCYTES # BLD AUTO: 0.09 K/UL (ref 0–0.11)
IMM GRANULOCYTES NFR BLD AUTO: 0.9 % (ref 0–0.9)
LACTATE SERPL-SCNC: 1.5 MMOL/L (ref 0.5–2)
LV EJECT FRACT  99904: 65
LYMPHOCYTES # BLD AUTO: 2.97 K/UL (ref 1–4.8)
LYMPHOCYTES NFR BLD: 28.3 % (ref 22–41)
MCH RBC QN AUTO: 24.1 PG (ref 27–33)
MCHC RBC AUTO-ENTMCNC: 31.2 G/DL (ref 32.2–35.5)
MCV RBC AUTO: 77.2 FL (ref 81.4–97.8)
MONOCYTES # BLD AUTO: 0.82 K/UL (ref 0–0.85)
MONOCYTES NFR BLD AUTO: 7.8 % (ref 0–13.4)
NEUTROPHILS # BLD AUTO: 6.42 K/UL (ref 1.82–7.42)
NEUTROPHILS NFR BLD: 61.2 % (ref 44–72)
NRBC # BLD AUTO: 0 K/UL
NRBC BLD-RTO: 0 /100 WBC (ref 0–0.2)
PLATELET # BLD AUTO: 331 K/UL (ref 164–446)
PMV BLD AUTO: 10.8 FL (ref 9–12.9)
POTASSIUM SERPL-SCNC: 3.5 MMOL/L (ref 3.6–5.5)
PROT SERPL-MCNC: 6.9 G/DL (ref 6–8.2)
RBC # BLD AUTO: 4.52 M/UL (ref 4.2–5.4)
SODIUM SERPL-SCNC: 140 MMOL/L (ref 135–145)
WBC # BLD AUTO: 10.5 K/UL (ref 4.8–10.8)

## 2024-12-08 PROCEDURE — 700102 HCHG RX REV CODE 250 W/ 637 OVERRIDE(OP): Performed by: STUDENT IN AN ORGANIZED HEALTH CARE EDUCATION/TRAINING PROGRAM

## 2024-12-08 PROCEDURE — RXMED WILLOW AMBULATORY MEDICATION CHARGE: Performed by: INTERNAL MEDICINE

## 2024-12-08 PROCEDURE — 700102 HCHG RX REV CODE 250 W/ 637 OVERRIDE(OP): Performed by: INTERNAL MEDICINE

## 2024-12-08 PROCEDURE — 85025 COMPLETE CBC W/AUTO DIFF WBC: CPT

## 2024-12-08 PROCEDURE — 36415 COLL VENOUS BLD VENIPUNCTURE: CPT

## 2024-12-08 PROCEDURE — A9270 NON-COVERED ITEM OR SERVICE: HCPCS | Performed by: INTERNAL MEDICINE

## 2024-12-08 PROCEDURE — 93005 ELECTROCARDIOGRAM TRACING: CPT | Mod: TC | Performed by: INTERNAL MEDICINE

## 2024-12-08 PROCEDURE — 80053 COMPREHEN METABOLIC PANEL: CPT

## 2024-12-08 PROCEDURE — 93306 TTE W/DOPPLER COMPLETE: CPT

## 2024-12-08 PROCEDURE — 99239 HOSP IP/OBS DSCHRG MGMT >30: CPT | Performed by: INTERNAL MEDICINE

## 2024-12-08 PROCEDURE — 93306 TTE W/DOPPLER COMPLETE: CPT | Mod: 26 | Performed by: INTERNAL MEDICINE

## 2024-12-08 PROCEDURE — 83605 ASSAY OF LACTIC ACID: CPT

## 2024-12-08 PROCEDURE — A9270 NON-COVERED ITEM OR SERVICE: HCPCS | Performed by: STUDENT IN AN ORGANIZED HEALTH CARE EDUCATION/TRAINING PROGRAM

## 2024-12-08 RX ORDER — PROPRANOLOL HYDROCHLORIDE 10 MG/1
10 TABLET ORAL 2 TIMES DAILY
Status: DISCONTINUED | OUTPATIENT
Start: 2024-12-08 | End: 2024-12-08 | Stop reason: HOSPADM

## 2024-12-08 RX ORDER — PROPRANOLOL HYDROCHLORIDE 10 MG/1
10 TABLET ORAL 3 TIMES DAILY
Qty: 30 TABLET | Refills: 1 | Status: SHIPPED | OUTPATIENT
Start: 2024-12-08 | End: 2024-12-16

## 2024-12-08 RX ADMIN — AMLODIPINE BESYLATE 10 MG: 10 TABLET ORAL at 06:30

## 2024-12-08 RX ADMIN — PROPRANOLOL HYDROCHLORIDE 10 MG: 10 TABLET ORAL at 10:37

## 2024-12-08 ASSESSMENT — FIBROSIS 4 INDEX: FIB4 SCORE: 0.46

## 2024-12-08 NOTE — ED NOTES
"Patient requesting to walk to the bathroom. Denied any dizziness or lightheadedness, but states \"I just feel shaky from the medicine.\" Patient refusing to use the bedside commode or bed nance. Per Dr. Aragon, patient okay to ambulate to the bathroom. Patient ambulatory to the bathroom-- standby assist. Urine samples collected and sent to lab.  "

## 2024-12-08 NOTE — ED PROVIDER NOTES
ER Provider Note    Scribed for  Jorge Aragon D.O. by Angy Francois. 12/7/2024   6:04 PM    Primary Care Provider: Dennis Pride D.O.    CHIEF COMPLAINT  Palpitations and tachycardia     EXTERNAL RECORDS REVIEWED  Inpatient Notes Patient was admitted on 11/20/24 for concerns for possible stroke and hypertension.     HPI/ROS  LIMITATION TO HISTORY   Select: : None  OUTSIDE HISTORIAN(S):  None     Melina Mcmahan is a 34 y.o. female with history of Graves disease presenting to the ED for palpations and tachycardia, onset today. Patient admits to associated lightheadedness and chest pain. She states that she has not experienced an elevated heart rate like this before. She denies any known history of arrhythmia. Patient otherwise denies shortness of breath and any other symptoms and injuries at this time.     PAST MEDICAL HISTORY  Past Medical History:   Diagnosis Date    Dyslipidemia 9/20/2023    Female cystocele 2/13/2015    Graves disease 11/11/2020    Hypertension [id number]    put on bedrest at 4mths    Thyrotoxicosis 11/1/2020       SURGICAL HISTORY  Past Surgical History:   Procedure Laterality Date    REPEAT C SECTION W TUBAL LIGATION  2/20/2014    Performed by Barbara Velasco M.D. at [facility]    DILATION AND CURETTAGE  2013    D & C    PRIMARY C SECTION  2006    position of baby, face first     OTHER ABDOMINAL SURGERY      c section       FAMILY HISTORY  Family History   Problem Relation Age of Onset    Cancer Mother         unknown    Diabetes Father         diet and pills    Hypertension Father     Diabetes Sister         eldest sister, controlled with diet and pills    COPD Sister     Other Paternal Grandmother         kidney transplant       SOCIAL HISTORY   reports that she quit smoking about 11 years ago. Her smoking use included cigarettes. She started smoking about 14 years ago. She has a 0.8 pack-year smoking history. She has never used smokeless tobacco. She reports that  she does not drink alcohol and does not use drugs.    CURRENT MEDICATIONS  Current Discharge Medication List        CONTINUE these medications which have NOT CHANGED    Details   amLODIPine (NORVASC) 5 MG Tab Take 2 Tablets by mouth every day.  Qty: 30 Tablet, Refills: 0    Associated Diagnoses: Hypertension, unspecified type      losartan (COZAAR) 50 MG Tab Take 2 Tablets by mouth every evening.  Qty: 30 Tablet, Refills: 0    Associated Diagnoses: Hypertension, unspecified type      atorvastatin (LIPITOR) 40 MG Tab Take 1 Tablet by mouth every evening.  Qty: 30 Tablet, Refills: 0    Associated Diagnoses: Dyslipidemia             ALLERGIES  No Known Allergies Noted    PHYSICAL EXAM  BP (!) 203/112   Pulse (!) 216   Temp 36.3 °C (97.3 °F) (Temporal)   Resp 16   SpO2 98%    General: Anxious and uncomfortable  Neuro: Awake alert and oriented, muscle strength sensation normal  Neck: Supple  Cardiac: Tachycardic rate and regular rhythm  Pulmonary: Clear to auscultation bilaterally no distress  Abdomen: Soft nontender non distended  Back: Non tender  Psych: Normal  Skin: Pink warm dry  Extremities: Full range of motion, muscle strength sensation intact 2+ pulses    DIAGNOSTIC STUDIES/PROCEDURES  Labs:   Labs Reviewed   CBC WITH DIFFERENTIAL - Abnormal; Notable for the following components:       Result Value    WBC 11.9 (*)     MCV 75.8 (*)     MCH 23.8 (*)     MCHC 31.4 (*)     Monos (Absolute) 1.07 (*)     All other components within normal limits   COMP METABOLIC PANEL - Abnormal; Notable for the following components:    Potassium 3.4 (*)     Co2 19 (*)     Glucose 127 (*)     All other components within normal limits   LACTIC ACID - Abnormal; Notable for the following components:    Lactic Acid 3.5 (*)     All other components within normal limits   CBC WITH DIFFERENTIAL - Abnormal; Notable for the following components:    Hemoglobin 10.9 (*)     Hematocrit 34.9 (*)     MCV 77.2 (*)     MCH 24.1 (*)     MCHC 31.2  (*)     All other components within normal limits   COMP METABOLIC PANEL - Abnormal; Notable for the following components:    Potassium 3.5 (*)     Co2 19 (*)     Glucose 145 (*)     AST(SGOT) 11 (*)     All other components within normal limits   LIPASE   TROPONIN   PROBRAIN NATRIURETIC PEPTIDE, NT   URINE DRUG SCREEN   DIAGNOSTIC ALCOHOL   FREE THYROXINE   TSH   MAGNESIUM   T3 FREE   HCG QUAL SERUM   BETA-HYDROXYBUTYRIC ACID   ESTIMATED GFR   COV-2, FLU A/B, AND RSV BY PCR ([facility])   LACTIC ACID   ESTIMATED GFR     I have independently interpreted the above labs    EKG:   Results for orders placed or performed during the hospital encounter of 24   EKG   Result Value Ref Range    Report       [facility] Emergency Dept.    Test Date:  2024  Pt Name:    MADDY FERREIRA                Department: ER  MRN:        [id number]                      Room:  Gender:     Female                       Technician: [id number]  :        1990                   Requested By:ER TRIAGE PROTOCOL  Order #:    [id number]                    Reading MD:    Measurements  Intervals                                Axis  Rate:       216                          P:          0  MD:         36                           QRS:        -4  QRSD:       76                           T:          207  QT:         219  QTc:        415    Interpretive Statements  Supraventricular tachycardia  Repolarization abnormality, prob rate related  Baseline wander in lead(s) II,aVR,aVF,V1,V2,V3,V4,V5  Compared to ECG 2024 12:09:03  Early repolarization now present  Sinus tachycardia no longer present  ST (T wave) deviation no longer present       I have independently interpreted this EKG    Radiology:   This attending emergency physician has independently interpreted the diagnostic imaging associated with this visit and is awaiting the final reading from the radiologist.   Preliminary interpretation is a follows: No acute  findings  Radiologist interpretation:   CT-CTA CHEST PULMONARY ARTERY W/ RECONS   Final Result         1. No CT evidence of pulmonary embolism.      2. No airspace opacity. No pleural effusion or pneumothorax.      EC-ECHOCARDIOGRAM COMPLETE W/O CONT    (Results Pending)          COURSE & MEDICAL DECISION MAKING       INITIAL ASSESSMENT, COURSE AND PLAN  Differential diagnoses include but not limited to: SVT, thyroid storm, electrolyte abnormality      Care Narrative: Patient presents with chest pain and is found to have SVT on presentation.  No history of the same.  Does have a history of Graves'.    6:04 PM - Patient was first seen and evaluated at bedside. Patient presents to the ED for palpitations and tachycardia with associated chest pain and lightheadedness. Ordered for EKG, free thyroxine, TSH, urine drug screen, lactic acid, pBNP, troponin, CMP, lipase, CBC w/diff, CT-CTA chest pulmonary artery w/ econds, and diagnostic alcohol to evaluate. The patient will be medicated with 6 MG Adenosine IM for her symptom. Patient verbalizes understanding and support with my plan of care.     Heart Score: Low risk    6:11 PM - I have ordered Adenosine injection 12 MG IM, a total of two doses have been giving at this point with pads on the patient. Patient's symptoms have been resolved. Additionally, I have ordered for EKG and Metoprolol 5 MG IM.     6:17 PM - I have paged for a hospitalist.     6:25 PM - I have ordered for beta-hydroxybutyric acid    8:36 PM -  I discussed the patient's case and the above findings with Dr. Naranjo (Hospitalist) who will see the patient.     8:40 PM - Paged for cardiology     9:11 PM - I discussed the patient's case and the above findings with Dr. Haddad (Cardiology) who recommended that once patient be discharged she follow up with electrophysiology. He has put this in patient's discharge follow up.     CRITICAL CARE  The very real possibilty of a deterioration of this patient's  condition required the highest level of my preparedness for sudden, emergent intervention.  I provided critical care services, which included medication orders, frequent reevaluations of the patient's condition and response to treatment, ordering and reviewing test results, and discussing the case with various consultants.  The critical care time associated with the care of the patient was 35 minutes. Review chart for interventions. This time is exclusive of any other billable procedures.        ED COURSE AND ADDITIONAL PROBLEMS    SVT: Diagnosed on initial EK L bolus, adenosine 6 mg failed to convert, 12 mg adenosine converted to sinus tachycardia at a rate of roughly 135, 5 mg of metoprolol given.  Resultant normal sinus rhythm.  Continue throughout ER stay.  Discussed with cardiology, and they recommend electrophysiology upon discharge.  Of note patient has a history of Graves' however the TSH T4 and T3 are normal today.  Additionally patient negative for pulmonary embolism on CT angiogram of the chest.    Acute chest pain: Patient had chest pain prior to chemical cardioversion and then chest pain resolved.  Troponin reassuring.  Would benefit from a second.    Hypokalemia: Repleted    Lactic acidosis: Crystalloid was administered and admission for follow-up after crystalloid resuscitation and monitoring.    DISPOSITION AND DISCUSSIONS    I have discussed management of the patient with the following physicians and JOSE F's:  Dr. Haddad (Cardiology), Dr. Naranjo (Hospitalist)     Discussion of management with other [facility] [place] or appropriate source(s): Pharmacy      DISPOSITION:  Patient will be hospitalized by Dr. Naranjo in stable condition.    FINAL DIAGNOSIS  1. SVT (supraventricular tachycardia) (HCC)    2. Hypokalemia    3. Lactic acidosis       I, Angy Francois (Paula), am scribing for, and in the presence of, Jorge Aragon D.O..    Electronically signed by: Angy Francois (Paula),  12/7/2024    IJorge D.O. personally performed the services described in this documentation, as scribed by Angy Francois in my presence, and it is both accurate and complete.      The note accurately reflects work and decisions made by me.  Jorge Aragon D.O.  12/8/2024  2:36 AM

## 2024-12-08 NOTE — ASSESSMENT & PLAN NOTE
Potassium 3.4, magnesium 2.2.    -Received KCl 40 mEq p.o. x 1 in the ED  -Order additional 20 mEq p.o. x 1  -Daily BMP

## 2024-12-08 NOTE — ED NOTES
Pt transferred out of ED at this time with Tele tech. Pt is A&Ox4, with stable vital signs and no apparent distress upon transfer.  All paperwork and personal belongings sent with pt.   Report given to transporter.    Pt transferred on tele monitor.

## 2024-12-08 NOTE — ASSESSMENT & PLAN NOTE
"Anesthesia Transfer of Care Note    Patient: Tasha Hawley    Procedure(s) Performed: Procedure(s) (LRB):  CYSTOSCOPY,WITH BOTULINUM TOXIN INJECTION (N/A)  INSERTION, SUPRAPUBIC CATHETER (N/A)    Patient location: PACU    Anesthesia Type: general    Transport from OR: Transported from OR on 2-3 L/min O2 by NC with adequate spontaneous ventilation    Post pain: adequate analgesia    Post assessment: no apparent anesthetic complications and tolerated procedure well    Post vital signs: stable    Level of consciousness: awake, alert and oriented    Nausea/Vomiting: no nausea/vomiting    Complications: none    Transfer of care protocol was followedComments: Nurse at bedside, VSS, spont reg resp noted      Last vitals:   Visit Vitals  BP (!) 112/57   Pulse 70   Temp 36.5 °C (97.7 °F) (Temporal)   Resp 17   Ht 5' 7" (1.702 m)   LMP  (LMP Unknown)   SpO2 (!) 94%   Breastfeeding No   BMI 30.54 kg/m²     " LA 3.5, likely due to SVT.    -Trend LA  -Received NS 1 L bolus x 1 in ED

## 2024-12-08 NOTE — ED NOTES
Patient to red 6 from [facility] for rapid heart rate. ERP called to bedside, placed on monitor, and pads. Dr. Aragon arrived to bedside and medicated per MAR.    [] : no respiratory distress [Auscultation Breath Sounds / Voice Sounds] : lungs were clear to auscultation bilaterally [Heart Rate And Rhythm] : heart rate was normal and rhythm regular [Heart Sounds] : normal S1 and S2 [Heart Sounds Gallop] : no gallops [Murmurs] : no murmurs [Heart Sounds Pericardial Friction Rub] : no pericardial rub [Site: ___] : Site: [unfilled] [Clean] : clean [Dry] : dry

## 2024-12-08 NOTE — PROGRESS NOTES
Bedside report received from off going RN/tech: ISAIAS Ho, assumed care of patient.     Fall Risk Score: LOW RISK  Fall risk interventions in place: Place yellow fall risk ID band on patient, Provide patient/family education based on risk assessment, Educate patient/family to call staff for assistance when getting out of bed, and Place fall precaution signage outside patient door  Bed type: Regular (Paul Score less than 17 interventions in place)  Patient on cardiac monitor: Yes  IVF/IV medications: Not Applicable   Oxygen: Room Air  Bedside sitter: Not Applicable   Isolation: Not applicable

## 2024-12-08 NOTE — ED NOTES
Pt return from CT. Ambulatory to bathroom with steady gait, return to [facility] and reconnected to monitors.

## 2024-12-08 NOTE — DISCHARGE SUMMARY
Discharge Summary    CHIEF COMPLAINT ON ADMISSION  No chief complaint on file.      Reason for Admission  Supraventricular tachycardia    Admission Date  12/7/2024    CODE STATUS  Full Code    HPI & HOSPITAL COURSE    34-year-old female with history of Graves' disease, hypertension and dyslipidemia who presented 12/7 with palpitation.  Patient has history of Graves' disease however her TSH in normal range, patient has history of palpitation and tachycardia especially on exertion, however on this time her palpitation did not resolve and decided to come to the hospital, on admission patient was found to have SVT with heart rate more than 200, blood pressure was elevated, patient received IV Dilaudid 12 mg IV for one-time and then 6 mg IV with metoprolol and converted to sinus.  Repeat TSH was normal.  CTA for chest did not show any PE or acute finding.  Cardiology was consulted and no further intervention or treatment needed, echo was done and showed normal ejection fraction with no valve disease, patient will be discharged on propranolol to follow-up with her cardiologist as outpatient.    Therefore, she is discharged in good and stable condition to home with close outpatient follow-up.    The patient met 2-midnight criteria for an inpatient stay at the time of discharge.    Discharge Date  12/08/24      FOLLOW UP ITEMS POST DISCHARGE  Follow-up with cardiology for hypertension and palpitations/SVT    DISCHARGE DIAGNOSES  Principal Problem:    SVT (supraventricular tachycardia) (HCC) (POA: Yes)  Active Problems:    Hypertension (POA: Yes)      Overview: -Elevated despite controlled graves       -she was on metoprolol in the past            9/20/23       -patient is on losartan 50 BID and amlodipine 5mg       -her BP at home in the 130s/70's            2/7/24      -patient bp is normally in the 130/80's     Graves disease (POA: Yes)      Overview: -Dx in 2020      -was on methimazole 5mg     Dyslipidemia (POA: Yes)     High anion gap metabolic acidosis (POA: Yes)    Lactic acidosis (POA: Yes)    Hypokalemia (POA: Yes)  Resolved Problems:    * No resolved hospital problems. *      FOLLOW UP  Future Appointments   Date Time Provider Department Center   12/10/2024  1:20 PM Dennis Pride D.O. [facility]   12/19/2024  9:20 AM Dennis Pride D.O. [facility]     Heart Electrophysionlogy  [phone]  Schedule an appointment as soon as possible for a visit      Dennis Pride D.O.  [address]  [facility] [address]  [phone]    Follow up in 1 week(s)        MEDICATIONS ON DISCHARGE     Medication List        START taking these medications        Instructions   propranolol 10 MG Tabs  Commonly known as: Inderal   Take 1 Tablet by mouth 3 times a day.  Dose: 10 mg            CONTINUE taking these medications        Instructions   amLODIPine 5 MG Tabs  Commonly known as: Norvasc   Take 2 Tablets by mouth every day.  Dose: 10 mg     atorvastatin 40 MG Tabs  Commonly known as: Lipitor   Take 1 Tablet by mouth every evening.  Dose: 40 mg     losartan 50 MG Tabs  Commonly known as: Cozaar   Take 2 Tablets by mouth every evening.  Dose: 100 mg              Allergies  No Known Allergies    DIET  Orders Placed This Encounter   Procedures    Diet Order Diet: Regular     Standing Status:   Standing     Number of Occurrences:   1     Order Specific Question:   Diet:     Answer:   Regular [1]       ACTIVITY  As tolerated.  Weight bearing as tolerated    CONSULTATIONS  Cardiology    PROCEDURES  None    LABORATORY  Lab Results   Component Value Date    SODIUM 140 12/08/2024    POTASSIUM 3.5 (L) 12/08/2024    CHLORIDE 107 12/08/2024    CO2 19 (L) 12/08/2024    GLUCOSE 145 (H) 12/08/2024    BUN 8 12/08/2024    CREATININE 0.54 12/08/2024    CREATININE 0.7 08/06/2006        Lab Results   Component Value Date    WBC 10.5 12/08/2024    HEMOGLOBIN 10.9 (L) 12/08/2024    HEMATOCRIT 34.9 (L) 12/08/2024    PLATELETCT 331  12/08/2024        Total time of the discharge process exceeds 56 minutes.

## 2024-12-08 NOTE — ED NOTES
Med rec updated and complete. Allergies reviewed.      Pt confirmed name and date of birth.    Pt denies anticoagulant and antiplatelet medications.    No outpatient antibiotic use in last 30 days.    Pt has not been taking any supplements since discharge.      Preferred Pharmacy  [facility] = [phone]    Discharge prescriptions filled at  [facility] [phone]

## 2024-12-08 NOTE — CARE PLAN
The patient is Stable - Low risk of patient condition declining or worsening    Shift Goals  Clinical Goals: monitor HR, echo    Progress made toward(s) clinical / shift goals:    Problem: Knowledge Deficit - Standard  Goal: Patient and family/care givers will demonstrate understanding of plan of care, disease process/condition, diagnostic tests and medications  Outcome: Progressing     Problem: Psychosocial  Goal: Patient's level of anxiety will decrease  Outcome: Progressing  Goal: Patient's ability to verbalize feelings about condition will improve  Outcome: Progressing     Problem: Hemodynamics  Goal: Patient's hemodynamics, fluid balance and neurologic status will be stable or improve  Outcome: Progressing     Problem: Medication  Goal: Compliance with prescribed medication will improve  Outcome: Progressing       Patient is not progressing towards the following goals:

## 2024-12-08 NOTE — H&P
Hospital Medicine History & Physical Note    Date of Service  12/7/2024    Primary Care Physician  Dennis Pride D.O.    Consultants  cardiology    Specialist Names: Dr. Haddad    Code Status  Full Code    Chief Complaint  No chief complaint on file.      History of Presenting Illness  Patient is a 34-year-old female with past medical history of Graves' disease, hypertension, DLD that presents after palpitations today.    Patient states that their baseline heart rate is in the 90s to 100s.  States that today at work while seated they could feel palpitations.  Found to be in SVT with heart rate into the 230s.     In the ED, BP 120s to 200s/60s to 110s, pulse 90s to 230s, RR 15-21, afebrile, saturating well on room air.  Received adenosine 12 mg IV x 1, adenosine 6 mg IV x 1, metoprolol 5 mg IV x 1, NS 1 L bolus x 1, KCl 40 mEq p.o. x 1.  WBC 11.9, hemoglobin 12.4, , sodium 139, potassium 3.4, bicarb 19, anion gap 14, creatinine 0.66, magnesium 2.2, lipase 33, lactic acid 3.5, alcohol less than 10, UDS negative, troponin 7, NT proBNP less than 36, beta-hCG negative, BHB 0.17, TSH 3.68, free T3 3.3, free T4 1.22.  CT PE without PE.  EKG showing SVT , QTc 415, without ST elevation.    I discussed the plan of care with patient.    Review of Systems  Review of Systems   Constitutional:  Negative for chills and fever.   HENT:  Negative for ear pain.    Eyes:  Negative for blurred vision, double vision and pain.   Respiratory:  Negative for cough, shortness of breath and wheezing.    Cardiovascular:  Positive for palpitations. Negative for chest pain and leg swelling.   Gastrointestinal:  Negative for abdominal pain, blood in stool, constipation, diarrhea, melena, nausea and vomiting.   Genitourinary:  Negative for dysuria, frequency and hematuria.   Musculoskeletal:  Negative for back pain, joint pain and neck pain.   Neurological:  Negative for dizziness and headaches.       Past Medical History   has a past  medical history of Dyslipidemia (9/20/2023), Female cystocele (2/13/2015), Graves disease (11/11/2020), Hypertension (1206-1962), and Thyrotoxicosis (11/1/2020).    Surgical History   has a past surgical history that includes other abdominal surgery; dilation and curettage (2013); primary c section (2006); and repeat c section w tubal ligation (2/20/2014).     Family History  family history includes COPD in her sister; Cancer in her mother; Diabetes in her father and sister; Hypertension in her father; Other in her paternal grandmother.   Family history reviewed with patient. There is no family history that is pertinent to the chief complaint.     Social History   reports that she quit smoking about 11 years ago. Her smoking use included cigarettes. She started smoking about 14 years ago. She has a 0.8 pack-year smoking history. She has never used smokeless tobacco. She reports that she does not drink alcohol and does not use drugs.    Allergies  No Known Allergies    Medications  Prior to Admission Medications   Prescriptions Last Dose Informant Patient Reported? Taking?   amLODIPine (NORVASC) 5 MG Tab 12/7/2024 at  8:00 AM Patient No Yes   Sig: Take 2 Tablets by mouth every day.   atorvastatin (LIPITOR) 40 MG Tab 12/4/2024 at  7:00 PM Patient No No   Sig: Take 1 Tablet by mouth every evening.   losartan (COZAAR) 50 MG Tab 12/6/2024 at  7:00 PM Patient No Yes   Sig: Take 2 Tablets by mouth every evening.      Facility-Administered Medications: None       Physical Exam  Temp:  [36.3 °C (97.3 °F)-37 °C (98.6 °F)] 36.7 °C (98.1 °F)  Pulse:  [] 88  Resp:  [15-21] 16  BP: (110-203)/() 127/85  SpO2:  [95 %-100 %] 96 %  Blood Pressure: 110/73   Temperature: 36.3 °C (97.3 °F)   Pulse: 97   Respiration: 18   Pulse Oximetry: 98 %       Physical Exam  Vitals reviewed.   Constitutional:       General: She is not in acute distress.     Appearance: Normal appearance. She is not ill-appearing, toxic-appearing or  diaphoretic.   HENT:      Head: Normocephalic and atraumatic.      Mouth/Throat:      Mouth: Mucous membranes are moist.      Pharynx: No oropharyngeal exudate or posterior oropharyngeal erythema.   Eyes:      General: No scleral icterus.     Extraocular Movements: Extraocular movements intact.      Conjunctiva/sclera: Conjunctivae normal.   Cardiovascular:      Rate and Rhythm: Normal rate and regular rhythm.      Heart sounds: Normal heart sounds. No murmur heard.     No friction rub. No gallop.   Pulmonary:      Effort: Pulmonary effort is normal. No respiratory distress.      Breath sounds: Normal breath sounds. No stridor. No wheezing, rhonchi or rales.   Abdominal:      General: Abdomen is flat. There is no distension.      Palpations: Abdomen is soft. There is no mass.      Tenderness: There is no abdominal tenderness. There is no guarding or rebound.      Hernia: No hernia is present.   Musculoskeletal:         General: No swelling or tenderness. Normal range of motion.      Cervical back: Neck supple. No rigidity.      Right lower leg: No edema.      Left lower leg: No edema.   Lymphadenopathy:      Cervical: No cervical adenopathy.   Skin:     General: Skin is warm and dry.      Coloration: Skin is not jaundiced.   Neurological:      Mental Status: She is alert and oriented to person, place, and time. Mental status is at baseline.      Cranial Nerves: No cranial nerve deficit.         Laboratory:  Recent Labs     12/07/24  1803   WBC 11.9*   RBC 5.21   HEMOGLOBIN 12.4   HEMATOCRIT 39.5   MCV 75.8*   MCH 23.8*   MCHC 31.4*   RDW 42.9   PLATELETCT 362   MPV 10.4     Recent Labs     12/07/24  1803   SODIUM 139   POTASSIUM 3.4*   CHLORIDE 106   CO2 19*   GLUCOSE 127*   BUN 10   CREATININE 0.66   CALCIUM 9.5     Recent Labs     12/07/24  1803   ALTSGPT 8   ASTSGOT 25   ALKPHOSPHAT 66   TBILIRUBIN 0.4   LIPASE 33   GLUCOSE 127*         Recent Labs     12/07/24  1803   NTPROBNP <36         Recent Labs      12/07/24  1803   TROPONINT 7       Imaging:  CT-CTA CHEST PULMONARY ARTERY W/ RECONS   Final Result         1. No CT evidence of pulmonary embolism.      2. No airspace opacity. No pleural effusion or pneumothorax.      EC-ECHOCARDIOGRAM COMPLETE W/O CONT    (Results Pending)       EKG:  I have personally reviewed the images and compared with prior images.    Assessment/Plan:  Justification for Admission Status  I anticipate this patient will require at least two midnights for appropriate medical management, necessitating inpatient admission because supraventricular tachycardia with lactic acidosis.    Patient will need a Telemetry bed on MEDICAL service .  The need is secondary to supraventricular tachycardia with lactic acidosis.    * SVT (supraventricular tachycardia) (HCC)- (present on admission)  Assessment & Plan  SVT into the 230s.  CT PE without PE.  EKG showing SVT , QTc 415, without ST elevation.  Thyroid labs within normal limits.  Alcohol negative, UDS negative    -Received adenosine 12 mg IV x 1, adenosine 6 mg IV x 1, metoprolol 5 mg IV x 1, NS 1 L bolus x 1, KCl 40 mEq p.o. x 1 in the ED  -Cardiology consulted in the ED with recommendation of outpatient electrophysiology follow-up  -TTE  -Counseled patient on vasovagal maneuvers including bearing down or blowing into an obstructed straw, also splashing face with cold water  -Consider starting on AV alejandra blockade on discharge given baseline heart rate 90s to 100s now with SVT    Hypokalemia- (present on admission)  Assessment & Plan  Potassium 3.4, magnesium 2.2.    -Received KCl 40 mEq p.o. x 1 in the ED  -Order additional 20 mEq p.o. x 1  -Daily BMP    Lactic acidosis- (present on admission)  Assessment & Plan  LA 3.5, likely due to SVT.    -Trend LA  -Received NS 1 L bolus x 1 in ED    High anion gap metabolic acidosis- (present on admission)  Assessment & Plan  LA 3.5, likely due to SVT.  BHB 0.17, WNL.    -Trend LA  -Received NS 1 L bolus x  1 in ED    Dyslipidemia- (present on admission)  Assessment & Plan  -Continue home atorvastatin    Graves disease- (present on admission)  Assessment & Plan  Diagnosed in 2020, previously on methimazole.    -Thyroid labs within normal limits; TSH 3.68, free T3 3.3, free T4 1.22    Hypertension- (present on admission)  Assessment & Plan  -Continue home amlodipine and losartan  -As needed labetalol        VTE prophylaxis: SCDs/TEDs and enoxaparin ppx

## 2024-12-08 NOTE — ASSESSMENT & PLAN NOTE
SVT into the 230s.  CT PE without PE.  EKG showing SVT , QTc 415, without ST elevation.  Thyroid labs within normal limits.  Alcohol negative, UDS negative    -Received adenosine 12 mg IV x 1, adenosine 6 mg IV x 1, metoprolol 5 mg IV x 1, NS 1 L bolus x 1, KCl 40 mEq p.o. x 1 in the ED  -Cardiology consulted in the ED with recommendation of outpatient electrophysiology follow-up  -TTE  -Counseled patient on vasovagal maneuvers including bearing down or blowing into an obstructed straw, also splashing face with cold water  -Consider starting on AV alejandra blockade on discharge given baseline heart rate 90s to 100s now with SVT

## 2024-12-08 NOTE — CONSULTS
Cardiology Interprofessional Telephone/Electronic Health Record Consult    Reason for Consult:  Asked by Dr Jorge Aragon D.O. for consultation of this patient with  Jorge Aragon     Patient presents with palpitations found to have SVT which responded to adenosine ruled out for pulmonary embolism      EKG tracings personally reviewed by me  [SVT    Echocardiogram images personally reviewed by me show     All pertinent features of laboratory and imaging reviewed including primary images where applicable          Assessment / Plan:  SVT adenosine responsive  Thyroid testing is normal    She can safely discharge from cardiovascular perspective and follow-up with EP service as an outpatient I put the contact in her discharge instructions she is established at [facility].      I personally discussed her case with  Dr Jorge Aragon D.O.     Future Appointments   Date Time Provider Department Center   12/10/2024  1:20 PM Dennis Pride D.O. [facility]   12/19/2024  9:20 AM Dennis Pride D.O. [facility]       It is my pleasure to participate in the care of Ms. Mcmahan.  Please do not hesitate to contact me with questions or concerns.    Spenser Haddad MD PhD FACC  Cardiologist [facility]    12/7/2024    I spent [8] minutes, more than half of which was verbal or electronic discussion with Dr Roosevelt Naranjo M.D., on today's consultation.  Dr Roosevelt Naranjo M.D. obtained verbal consent from the patient for me to consult on their care.  The patient and I are in [place].     Please note that this dictation was created using voice recognition software. There may be errors I did not discover before finalizing the note.  The recommendations provided in this electronic health record consult are based on the clinical data available to me at the time and are furnished without the benefit of a comprehensive in-person evaluation of the patient.  Any new  clinical issues or changes in patient's status since the filing of this electronic health record consult response will need to be taken into account when assessing these recommendations.  Please contact me if you have further questions.

## 2024-12-08 NOTE — ASSESSMENT & PLAN NOTE
Diagnosed in 2020, previously on methimazole.    -Thyroid labs within normal limits; TSH 3.68, free T3 3.3, free T4 1.22

## 2024-12-08 NOTE — PROGRESS NOTES
4 eye skin assessment Completed by Georgia ESPARZA and Martin ESCOBEDO    Head WDL  Ears WDL  Nose WDL  Mouth WDL  Neck WDL  Breast/Chest WDL  Shoulder Blades WDL  Spine WDL  R Arm/Elbow/Hand WDL  L Arm/Elbow/Hand WDL  Abdomen WDL  Groin WDL  Coccyx/Buttocks WDL  R leg WDL  L Leg WDL  R heel/foot/toe WDL  L heel/foot/toe WDL    Devices in place, tele box and pulse ox    Interventions in place: Pillows  Possible skin injury: No    Wound consult placed: n/a  RN wound prevention protocol ordered: no

## 2024-12-08 NOTE — PROGRESS NOTES
Report received from ED RN  Lolita . Pt is currently A&Ox4, SR, breathing at a normal rate and rhythm, warm, dry, and skin color appropriate for ethnicity, and in no visible emotional or physical distress. Bed locked in lowest position, call light is within reach, fall prevention measures in place. Pt instructed to use call light. Pt is on a cardiac monitor, order verified, transmitter connected appropriately, and leads placed correctly, rhythm and rate assessed by RN, monitor staff updated of changes and parameters as necessary.  Patient denies pain, dizziness, lightheadedness on admission. Assumed care of pt.

## 2024-12-10 ENCOUNTER — APPOINTMENT (OUTPATIENT)
Dept: MEDICAL GROUP | Facility: LAB | Age: 34
End: 2024-12-10
Payer: COMMERCIAL

## 2024-12-16 ENCOUNTER — OFFICE VISIT (OUTPATIENT)
Dept: MEDICAL GROUP | Facility: LAB | Age: 34
End: 2024-12-16
Payer: COMMERCIAL

## 2024-12-16 VITALS
HEIGHT: 64 IN | DIASTOLIC BLOOD PRESSURE: 70 MMHG | TEMPERATURE: 97.7 F | BODY MASS INDEX: 34.72 KG/M2 | RESPIRATION RATE: 18 BRPM | WEIGHT: 203.4 LBS | OXYGEN SATURATION: 98 % | HEART RATE: 90 BPM | SYSTOLIC BLOOD PRESSURE: 140 MMHG

## 2024-12-16 DIAGNOSIS — L83 ACANTHOSIS NIGRICANS: ICD-10-CM

## 2024-12-16 DIAGNOSIS — I10 PRIMARY HYPERTENSION: ICD-10-CM

## 2024-12-16 DIAGNOSIS — Z09 HOSPITAL DISCHARGE FOLLOW-UP: Primary | ICD-10-CM

## 2024-12-16 DIAGNOSIS — I47.10 SVT (SUPRAVENTRICULAR TACHYCARDIA) (HCC): ICD-10-CM

## 2024-12-16 DIAGNOSIS — E78.5 DYSLIPIDEMIA: ICD-10-CM

## 2024-12-16 PROBLEM — E87.6 HYPOKALEMIA: Status: RESOLVED | Noted: 2024-12-07 | Resolved: 2024-12-16

## 2024-12-16 PROBLEM — E87.29 HIGH ANION GAP METABOLIC ACIDOSIS: Status: RESOLVED | Noted: 2024-12-07 | Resolved: 2024-12-16

## 2024-12-16 RX ORDER — METOPROLOL SUCCINATE 25 MG/1
25 TABLET, EXTENDED RELEASE ORAL DAILY
Qty: 30 TABLET | Refills: 1 | Status: SHIPPED | OUTPATIENT
Start: 2024-12-16

## 2024-12-16 RX ORDER — ATORVASTATIN CALCIUM 40 MG/1
40 TABLET, FILM COATED ORAL NIGHTLY
Qty: 90 TABLET | Refills: 1 | Status: SHIPPED | OUTPATIENT
Start: 2024-12-16

## 2024-12-16 ASSESSMENT — ENCOUNTER SYMPTOMS
SHORTNESS OF BREATH: 0
CHILLS: 0
FEVER: 0

## 2024-12-16 ASSESSMENT — FIBROSIS 4 INDEX: FIB4 SCORE: 0.46

## 2024-12-16 NOTE — ASSESSMENT & PLAN NOTE
Chronic  -I advised patient to move her amlodipine to lunch time   -may consider switching losartan to the afternoon too or checking 24 hour bp monitor

## 2024-12-16 NOTE — ASSESSMENT & PLAN NOTE
New diagnosis  -patient was started propanolol TID, patient finds this difficult at times to keep up with, will provide metoprolol with converted dose

## 2024-12-16 NOTE — PROGRESS NOTES
Subjective:     Melina Mcmahan is a 34 y.o. female who presents for Hospital Follow-up.    Transitional Care Management          HPI:   She was hospitalized twice.    On 11/20 she was admitted for 23 hours for TIA with vision loss, left leg weakness and slurred speech. She was found to be hypertensive. Her does for losartan and amlodipine were increased to 100mg and 10mg respectively. She was also started for atorvastatin 40mg for presumed TIA.    Patient on 12/7 presented with palpitations.  Patient was found to have SVT with a heart rate more than 200 and elevated blood pressure.  Patient was given IV metoprolol and converted back to sinus rhythm.  TSH was checked and the hospital and within normal.  CTA chest did not show signs of pulmonary embolism.  Cardiology was consulted and did not recommend further treatment.  Echo was completed and reportedly showed normal ejection fraction with no valvular disease.  Patient was discharged with propranolol with cardiology follow-up.      Interval hx:  -patient's BP continues to stay elevated mostly in the afternoon despite maximal doses of amlodipine and losartan.   -patient reports some residual weakness in her left leg which is improving  Current medicines (including reconciliation performed today)  Current Outpatient Medications   Medication Sig Dispense Refill    metoprolol SR (TOPROL XL) 25 MG TABLET SR 24 HR Take 1 Tablet by mouth every day. 30 Tablet 1    atorvastatin (LIPITOR) 40 MG Tab Take 1 Tablet by mouth every evening. 90 Tablet 1    amLODIPine (NORVASC) 5 MG Tab Take 2 Tablets by mouth every day. 30 Tablet 0    losartan (COZAAR) 50 MG Tab Take 2 Tablets by mouth every evening. 30 Tablet 0     No current facility-administered medications for this visit.       Allergies:   Patient has no known allergies.    Social History     Tobacco Use    Smoking status: Former     Current packs/day: 0.00     Average packs/day: 0.3 packs/day for 3.0 years (0.8 ttl  "pk-yrs)     Types: Cigarettes     Start date: 3/19/2010     Quit date: 3/19/2013     Years since quittin.7    Smokeless tobacco: Never    Tobacco comments:     1 cig dialy    Vaping Use    Vaping status: Never Used   Substance Use Topics    Alcohol use: No    Drug use: No       ROS:  Review of Systems   Constitutional:  Negative for chills and fever.   Respiratory:  Negative for shortness of breath.    Cardiovascular:  Negative for chest pain.         Objective:     Vitals:    24 1302   BP: (!) 140/70   BP Location: Right arm   Patient Position: Sitting   BP Cuff Size: Adult   Pulse: 90   Resp: 18   Temp: 36.5 °C (97.7 °F)   TempSrc: Temporal   SpO2: 98%   Weight: 92.3 kg (203 lb 6.4 oz)   Height: 1.626 m (5' 4\")     Body mass index is 34.91 kg/m².    Physical Exam:  Physical Exam  Cardiovascular:      Rate and Rhythm: Normal rate and regular rhythm.      Pulses: Normal pulses.      Heart sounds:      No friction rub. No gallop.           Assessment and Plan:   1. SVT (supraventricular tachycardia) (HCC)  - REFERRAL TO CARDIOLOGY  - metoprolol SR (TOPROL XL) 25 MG TABLET SR 24 HR; Take 1 Tablet by mouth every day.  Dispense: 30 Tablet; Refill: 1    2. Acanthosis nigricans    3. Dyslipidemia  - atorvastatin (LIPITOR) 40 MG Tab; Take 1 Tablet by mouth every evening.  Dispense: 90 Tablet; Refill: 1    4. Primary hypertension      - Chart and discharge summary were reviewed.   - Hospitalization and results reviewed with patient.   - Medications reviewed including instructions regarding high risk medications, dosing and side effects.  - Recommended Services: No services needed at this time  - Advance directive/POLST on file?  No     Follow-up:2 weeks BP     Face-to-face transitional care management services with HIGH (today's visit is within days post discharge & LACE+ score 59+) medical decision complexity were provided.           "

## 2024-12-19 ENCOUNTER — APPOINTMENT (OUTPATIENT)
Dept: MEDICAL GROUP | Facility: LAB | Age: 34
End: 2024-12-19
Payer: COMMERCIAL

## 2025-01-02 ENCOUNTER — APPOINTMENT (OUTPATIENT)
Dept: MEDICAL GROUP | Facility: LAB | Age: 35
End: 2025-01-02
Payer: COMMERCIAL

## 2025-01-02 DIAGNOSIS — R68.2 DRY MOUTH: ICD-10-CM

## 2025-01-02 DIAGNOSIS — I10 PRIMARY HYPERTENSION: ICD-10-CM

## 2025-01-02 PROCEDURE — 99214 OFFICE O/P EST MOD 30 MIN: CPT | Mod: 95 | Performed by: STUDENT IN AN ORGANIZED HEALTH CARE EDUCATION/TRAINING PROGRAM

## 2025-01-02 ASSESSMENT — ENCOUNTER SYMPTOMS
SHORTNESS OF BREATH: 0
CHILLS: 0
FEVER: 0

## 2025-01-02 NOTE — ASSESSMENT & PLAN NOTE
Chronic-side effect  - Patient will stop amlodipine for 1 week and we will see if she has improvements, she will update us via [employer]  - Continue losartan 100 mg

## 2025-01-02 NOTE — PROGRESS NOTES
Subjective:   This evaluation was conducted via teams using secure and encrypted videoconferencing technology. The patient was in a private location in the [facility].    The patient's identity was confirmed and verbal consent was obtained for this virtual visit.    CC: BP follow up      HPI:   History of Present Illness         Problem   Dry Mouth    STOP-BANG Score for Obstructive Sleep Apnea     RESULT SUMMARY:  3 points  STOP-BANG    Intermediate   Risk for moderate to severe JAREK      INPUTS:  Do you snore loudly? --> 0 = No  Do you often feel tired, fatigued, or sleepy during the daytime? --> 1 = Yes  Has anyone observed you stop breathing during sleep? --> 1 = Yes  Do you have (or are you being treated for) high blood pressure? --> 1 = Yes  BMI --> 0 = <=35 kg/m²  Age --> 0 = <=50 years  Neck circumference --> 0 = <=40 cm  Gender --> 0 = Female       Hypertension    -Elevated despite controlled graves   -she was on metoprolol in the past    9/20/23   -patient is on losartan 50 BID and amlodipine 5mg   -her BP at home in the 130s/70's    2/7/24  -patient bp is normally in the 130/80's     1/2/25  -previously patient was advised patient to move her amlodipine to lunch time   -Patient reports her BP is within target range since making the change and she does feel better overall  -She admits to increased swelling in her feet and ankles especially when she has been standing for long periods of time which is a new symptom for her         Current Outpatient Medications Ordered in Epic   Medication Sig Dispense Refill    metoprolol SR (TOPROL XL) 25 MG TABLET SR 24 HR Take 1 Tablet by mouth every day. 30 Tablet 1    atorvastatin (LIPITOR) 40 MG Tab Take 1 Tablet by mouth every evening. 90 Tablet 1    amLODIPine (NORVASC) 5 MG Tab Take 2 Tablets by mouth every day. 30 Tablet 0    losartan (COZAAR) 50 MG Tab Take 2 Tablets by mouth every evening. 30 Tablet 0     No current Epic-ordered facility-administered  medications on file.           ROS:  Review of Systems   Constitutional:  Negative for chills and fever.   Respiratory:  Negative for shortness of breath.    Cardiovascular:  Negative for chest pain.       Objective:     Exam:  LMP 11/21/2024  There is no height or weight on file to calculate BMI.    Physical Exam  Constitutional:       General: She is not in acute distress.     Appearance: She is not ill-appearing.   Pulmonary:      Effort: Pulmonary effort is normal.   Neurological:      Mental Status: She is alert.   Psychiatric:         Mood and Affect: Mood normal.         Behavior: Behavior normal.         Thought Content: Thought content normal.         Judgment: Judgment normal.                   Assessment & Plan:     Problem List Items Addressed This Visit       Dry mouth     Chronic  -check sleep study         Relevant Orders    Overnight Home Sleep Study    Hypertension     Chronic-side effect  - Patient will stop amlodipine for 1 week and we will see if she has improvements, she will update us via [employer]  - Continue losartan 100 mg          2 month follow up       Please note that this dictation was created using voice recognition software. I have made every reasonable attempt to correct obvious errors, but I expect that there are errors of grammar and possibly content that I did not discover before finalizing the note.

## 2025-01-13 ENCOUNTER — APPOINTMENT (OUTPATIENT)
Dept: CARDIOLOGY | Facility: MEDICAL CENTER | Age: 35
End: 2025-01-13
Attending: STUDENT IN AN ORGANIZED HEALTH CARE EDUCATION/TRAINING PROGRAM
Payer: COMMERCIAL

## 2025-01-30 ENCOUNTER — HOME STUDY (OUTPATIENT)
Dept: SLEEP MEDICINE | Facility: MEDICAL CENTER | Age: 35
End: 2025-01-30
Attending: STUDENT IN AN ORGANIZED HEALTH CARE EDUCATION/TRAINING PROGRAM
Payer: COMMERCIAL

## 2025-01-30 DIAGNOSIS — I10 PRIMARY HYPERTENSION: ICD-10-CM

## 2025-01-30 DIAGNOSIS — G47.33 OSA (OBSTRUCTIVE SLEEP APNEA): ICD-10-CM

## 2025-01-30 DIAGNOSIS — R68.2 DRY MOUTH: ICD-10-CM

## 2025-01-30 PROCEDURE — 95800 SLP STDY UNATTENDED: CPT | Performed by: STUDENT IN AN ORGANIZED HEALTH CARE EDUCATION/TRAINING PROGRAM

## 2025-02-03 NOTE — PROCEDURES
DIAGNOSTIC HOME SLEEP TEST (HST) REPORT WatchPAT      PATIENT ID:  NAME:  Melina Mcmahan  MRN:               [id number]  YOB: 1990  DATE OF STUDY: 1/30/2025      Impression:     This study shows evidence of:      1.  Mild obstructive sleep apnea with PAT apnea hypopnea index(3% pAHI) of 5.2 per hour.  PAT respiratory disturbance index (pRDI) was 9.4 per hour. These findings are based on 7 channels recording of PAT signal with sleep staging, heart rate, pulse oximetry, actigraphy, body position, snoring and respiratory movement.     2. Oxygenation O2 Sat. mean O2 sat was 95%,  heather was 74%,  and maximum O2 at 98%. O2 sat was at or  below 88% for 0.2 min of evaluation time. Oxygen Desaturation (>=4%) Index was 1.6/hr. AVG HR was 68 BPM.      TECHNICAL DESCRIPTION: Patient underwent home sleep apnea testing with peripheral arterial tone signal (WatchPAT™). This is a Type IV portable monitor and device per Medicare. Monitoring was done with 7 channels recording of PAT signal with sleep staging, heart rate, pulse oximetry, actigraphy, body position, snoring and respiratory movement. Prior to using the device, the patient received verbal and written instructions for its application and was provided with the help desk phone number for additional telephonic instruction with 24-hour availability of qualified personnel to answer questions.    Respiratory events:        General sleep summary: . Total recording time is 8 hours and 41 minutes and total Sleep time is 7 hours and 44 minutes. The patient spent 2 minutes in the supine position and 462.4 minutes in the nonsupine position.      Recommendations:    1. Auto CPAP trial, recommend 5-15 cmH2O with EPR 3 with heated tubing and proper mask fit.    2. I also recommend 30 day compliance download to assess the efficacy to the recommended pressure, measure leak, apnea hypopnea index and compliance for further outpatient monitoring and management of PAP  therapy. In some cases alternative treatment options may be proven effective in resolving sleep apnea. These options include upper airway surgery, the use of a dental orthotic, weight loss, or positional therapy. Clinical correlation is required. In general patients with sleep apnea are advised to avoid alcohol, sedatives and not to operate a motor vehicle while drowsy. Untreated sleep apnea increases the risk for cardiovascular and neurovascular disease.            Kiera Sotelo MD

## 2025-02-12 DIAGNOSIS — I47.10 SVT (SUPRAVENTRICULAR TACHYCARDIA) (HCC): ICD-10-CM

## 2025-02-13 RX ORDER — METOPROLOL SUCCINATE 25 MG/1
25 TABLET, EXTENDED RELEASE ORAL DAILY
Qty: 90 TABLET | Refills: 3 | Status: SHIPPED | OUTPATIENT
Start: 2025-02-13

## 2025-02-14 DIAGNOSIS — I10 HYPERTENSION, UNSPECIFIED TYPE: ICD-10-CM

## 2025-02-14 RX ORDER — LOSARTAN POTASSIUM 100 MG/1
100 TABLET ORAL DAILY
Qty: 90 TABLET | Refills: 3 | Status: SHIPPED | OUTPATIENT
Start: 2025-02-14

## 2025-03-03 ENCOUNTER — APPOINTMENT (OUTPATIENT)
Dept: MEDICAL GROUP | Facility: LAB | Age: 35
End: 2025-03-03
Payer: COMMERCIAL

## 2025-03-11 ENCOUNTER — APPOINTMENT (OUTPATIENT)
Dept: MEDICAL GROUP | Facility: LAB | Age: 35
End: 2025-03-11
Payer: COMMERCIAL

## 2025-03-11 VITALS
TEMPERATURE: 97.5 F | DIASTOLIC BLOOD PRESSURE: 66 MMHG | HEIGHT: 64 IN | SYSTOLIC BLOOD PRESSURE: 132 MMHG | HEART RATE: 94 BPM | RESPIRATION RATE: 18 BRPM | WEIGHT: 205 LBS | OXYGEN SATURATION: 98 % | BODY MASS INDEX: 35 KG/M2

## 2025-03-11 DIAGNOSIS — Z00.00 ANNUAL PHYSICAL EXAM: ICD-10-CM

## 2025-03-11 DIAGNOSIS — E88.810 METABOLIC SYNDROME: ICD-10-CM

## 2025-03-11 DIAGNOSIS — I10 PRIMARY HYPERTENSION: ICD-10-CM

## 2025-03-11 DIAGNOSIS — I47.10 SVT (SUPRAVENTRICULAR TACHYCARDIA) (HCC): ICD-10-CM

## 2025-03-11 DIAGNOSIS — G47.33 OBSTRUCTIVE SLEEP APNEA SYNDROME: ICD-10-CM

## 2025-03-11 PROBLEM — G47.30 SLEEP APNEA: Status: ACTIVE | Noted: 2025-01-02

## 2025-03-11 PROBLEM — E87.20 LACTIC ACIDOSIS: Status: RESOLVED | Noted: 2024-12-07 | Resolved: 2025-03-11

## 2025-03-11 PROBLEM — I16.0 HYPERTENSIVE URGENCY: Status: RESOLVED | Noted: 2024-11-20 | Resolved: 2025-03-11

## 2025-03-11 PROCEDURE — 3075F SYST BP GE 130 - 139MM HG: CPT | Performed by: STUDENT IN AN ORGANIZED HEALTH CARE EDUCATION/TRAINING PROGRAM

## 2025-03-11 PROCEDURE — 99214 OFFICE O/P EST MOD 30 MIN: CPT | Performed by: STUDENT IN AN ORGANIZED HEALTH CARE EDUCATION/TRAINING PROGRAM

## 2025-03-11 PROCEDURE — 3078F DIAST BP <80 MM HG: CPT | Performed by: STUDENT IN AN ORGANIZED HEALTH CARE EDUCATION/TRAINING PROGRAM

## 2025-03-11 RX ORDER — TIRZEPATIDE 2.5 MG/.5ML
2.5 INJECTION, SOLUTION SUBCUTANEOUS
Qty: 2 ML | Refills: 0 | Status: SHIPPED | OUTPATIENT
Start: 2025-03-11 | End: 2025-03-24

## 2025-03-11 ASSESSMENT — FIBROSIS 4 INDEX: FIB4 SCORE: 0.46

## 2025-03-11 ASSESSMENT — ENCOUNTER SYMPTOMS
CHILLS: 0
SHORTNESS OF BREATH: 0
FEVER: 0

## 2025-03-11 ASSESSMENT — PATIENT HEALTH QUESTIONNAIRE - PHQ9: CLINICAL INTERPRETATION OF PHQ2 SCORE: 0

## 2025-03-11 NOTE — ASSESSMENT & PLAN NOTE
Chronic-not controlled  -will prescribe zepbound which has been fda approved for sleep apnea recently. I do not believe patient will be able to tolerate cpap machine   -Medication discussed with patient

## 2025-03-11 NOTE — PROGRESS NOTES
Subjective:     CC:   Chief Complaint   Patient presents with    Follow-Up     BP/Sleep study        HPI:       Problem   Metabolic Syndrome    Elevated triglycerides, blood pressure,      Sleep Apnea    STOP-BANG Score for Obstructive Sleep Apnea     RESULT SUMMARY:  3 points  STOP-BANG    Intermediate   Risk for moderate to severe JAREK      INPUTS:  Do you snore loudly? --> 0 = No  Do you often feel tired, fatigued, or sleepy during the daytime? --> 1 = Yes  Has anyone observed you stop breathing during sleep? --> 1 = Yes  Do you have (or are you being treated for) high blood pressure? --> 1 = Yes  BMI --> 0 = <=35 kg/m²  Age --> 0 = <=50 years  Neck circumference --> 0 = <=40 cm  Gender --> 0 = Female      3/11/25  -patient's sleep study concerning for sleep apnea      Hypertension    -Elevated despite controlled graves   -she was on metoprolol in the past    9/20/23   -patient is on losartan 50 BID and amlodipine 5mg   -her BP at home in the 130s/70's    2/7/24  -patient bp is normally in the 130/80's     1/2/25  -previously patient was advised patient to move her amlodipine to lunch time   -Patient reports her BP is within target range since making the change and she does feel better overall  -She admits to increased swelling in her feet and ankles especially when she has been standing for long periods of time which is a new symptom for her    3/11/2025  - Patient is currently on losartan 100 mg and metoprolol 25 mg XR, she was not able to tolerate amlodipine in the past therefore it was stopped     Lactic Acidosis (Resolved)   Hypertensive Urgency (Resolved)       Current Outpatient Medications Ordered in Epic   Medication Sig Dispense Refill    Tirzepatide-Weight Management (ZEPBOUND) 2.5 MG/0.5ML Solution Auto-injector Inject 2.5 mg under the skin every 7 days. 2 mL 0    losartan (COZAAR) 100 MG Tab Take 1 Tablet by mouth every day. 90 Tablet 3    metoprolol SR (TOPROL XL) 25 MG TABLET SR 24 HR Take 1 tablet  "by mouth once daily 90 Tablet 3    atorvastatin (LIPITOR) 40 MG Tab Take 1 Tablet by mouth every evening. 90 Tablet 1     No current Epic-ordered facility-administered medications on file.           ROS:  Review of Systems   Constitutional:  Negative for chills and fever.   Respiratory:  Negative for shortness of breath.    Cardiovascular:  Negative for chest pain.       Objective:     Exam:  /66 (BP Location: Right arm, Patient Position: Sitting, BP Cuff Size: Adult)   Pulse 94   Temp 36.4 °C (97.5 °F) (Temporal)   Resp 18   Ht 1.626 m (5' 4\")   Wt 93 kg (205 lb)   SpO2 98%   BMI 35.19 kg/m²  Body mass index is 35.19 kg/m².    Physical Exam  Constitutional:       General: She is not in acute distress.     Appearance: She is not ill-appearing.   Pulmonary:      Effort: Pulmonary effort is normal.   Neurological:      Mental Status: She is alert.   Psychiatric:         Mood and Affect: Mood normal.         Behavior: Behavior normal.         Thought Content: Thought content normal.         Judgment: Judgment normal.                   Assessment & Plan:     Problem List Items Addressed This Visit       Hypertension    Chronic-stable  - Continue losartan 100 mg and metoprolol 25 mg         Metabolic syndrome    Sleep apnea    Chronic-not controlled  -will prescribe zepbound which has been fda approved for sleep apnea recently. I do not believe patient will be able to tolerate cpap machine   -Medication discussed with patient         Relevant Medications    Tirzepatide-Weight Management (ZEPBOUND) 2.5 MG/0.5ML Solution Auto-injector    SVT (supraventricular tachycardia) (HCC)    Relevant Orders    REFERRAL TO CARDIOLOGY     Other Visit Diagnoses         Annual physical exam        Relevant Orders    HEMOGLOBIN A1C    CBC WITH DIFFERENTIAL    Comp Metabolic Panel    TSH WITH REFLEX TO FT4    Lipid Profile    VITAMIN D,25 HYDROXY (DEFICIENCY)                Please note that this dictation was created using " voice recognition software. I have made every reasonable attempt to correct obvious errors, but I expect that there are errors of grammar and possibly content that I did not discover before finalizing the note.

## 2025-03-20 ENCOUNTER — PATIENT MESSAGE (OUTPATIENT)
Dept: MEDICAL GROUP | Facility: LAB | Age: 35
End: 2025-03-20
Payer: COMMERCIAL

## 2025-03-20 DIAGNOSIS — E66.811 CLASS 1 OBESITY DUE TO EXCESS CALORIES WITHOUT SERIOUS COMORBIDITY WITH BODY MASS INDEX (BMI) OF 33.0 TO 33.9 IN ADULT: ICD-10-CM

## 2025-03-20 DIAGNOSIS — E66.09 CLASS 1 OBESITY DUE TO EXCESS CALORIES WITHOUT SERIOUS COMORBIDITY WITH BODY MASS INDEX (BMI) OF 33.0 TO 33.9 IN ADULT: ICD-10-CM

## 2025-03-20 NOTE — PATIENT COMMUNICATION
PRIOR AUTH  Melina Mcmahan (Key: [id number])  Zepbound 2.5MG/0.5ML pen-injectors    Information regarding your request  [employer] does not handle this review. Please visit [email or web address] to start a prior authorization or fax information to [phone]. Please include all supporting chart notes. You may contact [employer] at [phone].

## 2025-03-24 RX ORDER — TIRZEPATIDE 2.5 MG/.5ML
2.5 INJECTION, SOLUTION SUBCUTANEOUS
Qty: 2 ML | Refills: 0 | Status: SHIPPED | OUTPATIENT
Start: 2025-03-24 | End: 2025-04-21

## 2025-04-10 DIAGNOSIS — E66.811 CLASS 1 OBESITY DUE TO EXCESS CALORIES WITHOUT SERIOUS COMORBIDITY WITH BODY MASS INDEX (BMI) OF 33.0 TO 33.9 IN ADULT: ICD-10-CM

## 2025-04-10 DIAGNOSIS — E66.09 CLASS 1 OBESITY DUE TO EXCESS CALORIES WITHOUT SERIOUS COMORBIDITY WITH BODY MASS INDEX (BMI) OF 33.0 TO 33.9 IN ADULT: ICD-10-CM

## 2025-04-10 RX ORDER — TIRZEPATIDE 5 MG/.5ML
5 INJECTION, SOLUTION SUBCUTANEOUS
Qty: 2 ML | Refills: 0 | Status: SHIPPED | OUTPATIENT
Start: 2025-04-10 | End: 2025-04-14

## 2025-04-14 ENCOUNTER — PATIENT MESSAGE (OUTPATIENT)
Dept: MEDICAL GROUP | Facility: LAB | Age: 35
End: 2025-04-14
Payer: COMMERCIAL

## 2025-04-14 DIAGNOSIS — E66.811 CLASS 1 OBESITY DUE TO EXCESS CALORIES WITHOUT SERIOUS COMORBIDITY WITH BODY MASS INDEX (BMI) OF 33.0 TO 33.9 IN ADULT: ICD-10-CM

## 2025-04-14 DIAGNOSIS — E66.09 CLASS 1 OBESITY DUE TO EXCESS CALORIES WITHOUT SERIOUS COMORBIDITY WITH BODY MASS INDEX (BMI) OF 33.0 TO 33.9 IN ADULT: ICD-10-CM

## 2025-04-14 RX ORDER — TIRZEPATIDE 2.5 MG/.5ML
INJECTION, SOLUTION SUBCUTANEOUS
Qty: 2 ML | Refills: 0 | OUTPATIENT
Start: 2025-04-14

## 2025-04-14 RX ORDER — TIRZEPATIDE 2.5 MG/.5ML
2.5 INJECTION, SOLUTION SUBCUTANEOUS
Qty: 2 ML | Refills: 6 | Status: SHIPPED | OUTPATIENT
Start: 2025-04-14 | End: 2025-10-27

## 2025-04-15 ENCOUNTER — OFFICE VISIT (OUTPATIENT)
Dept: CARDIOLOGY | Facility: MEDICAL CENTER | Age: 35
End: 2025-04-15
Attending: STUDENT IN AN ORGANIZED HEALTH CARE EDUCATION/TRAINING PROGRAM
Payer: COMMERCIAL

## 2025-04-15 VITALS
DIASTOLIC BLOOD PRESSURE: 74 MMHG | OXYGEN SATURATION: 100 % | HEART RATE: 88 BPM | WEIGHT: 199 LBS | RESPIRATION RATE: 14 BRPM | BODY MASS INDEX: 33.97 KG/M2 | SYSTOLIC BLOOD PRESSURE: 122 MMHG | HEIGHT: 64 IN

## 2025-04-15 DIAGNOSIS — I47.10 SVT (SUPRAVENTRICULAR TACHYCARDIA) (HCC): ICD-10-CM

## 2025-04-15 PROCEDURE — 99215 OFFICE O/P EST HI 40 MIN: CPT | Performed by: STUDENT IN AN ORGANIZED HEALTH CARE EDUCATION/TRAINING PROGRAM

## 2025-04-15 PROCEDURE — 3074F SYST BP LT 130 MM HG: CPT | Performed by: STUDENT IN AN ORGANIZED HEALTH CARE EDUCATION/TRAINING PROGRAM

## 2025-04-15 PROCEDURE — 99211 OFF/OP EST MAY X REQ PHY/QHP: CPT | Performed by: STUDENT IN AN ORGANIZED HEALTH CARE EDUCATION/TRAINING PROGRAM

## 2025-04-15 PROCEDURE — 3078F DIAST BP <80 MM HG: CPT | Performed by: STUDENT IN AN ORGANIZED HEALTH CARE EDUCATION/TRAINING PROGRAM

## 2025-04-15 RX ORDER — METOPROLOL SUCCINATE 25 MG/1
37.5 TABLET, EXTENDED RELEASE ORAL DAILY
Qty: 90 TABLET | Refills: 3 | Status: SHIPPED | OUTPATIENT
Start: 2025-04-15

## 2025-04-15 ASSESSMENT — FIBROSIS 4 INDEX: FIB4 SCORE: 0.46

## 2025-04-16 ENCOUNTER — TELEPHONE (OUTPATIENT)
Dept: CARDIOLOGY | Facility: MEDICAL CENTER | Age: 35
End: 2025-04-16
Payer: COMMERCIAL

## 2025-04-16 DIAGNOSIS — I47.10 SVT (SUPRAVENTRICULAR TACHYCARDIA) (HCC): ICD-10-CM

## 2025-04-16 NOTE — TELEPHONE ENCOUNTER
----- Message from Physician Skye Phillips MD, PhD sent at 4/15/2025  5:17 PM PDT -----  Regarding: EPS SVT  Please schedule SVT ablation with general anesthesia  Please hold metoprolol 3 days prior to the procedure.  Thanks

## 2025-04-16 NOTE — PROGRESS NOTES
Arrhythmia Clinic Note (New EP patient)    DOS: 4/15/2025     Chief complaint/Reason for consult: Paroxysmal supra ventricular tachycardia    Referring provider: Reid Sanchez M.D.     Interval History:  Ms.Vanessa Bella Mcmahan is a 74 years old female with medical history of Graves' disease, hypertension follows EP for supraventricular tachycardia.  She developed tachycardia on 12/7/2024 and she went to ER with findings of supraventricular cardia with heart rate of 1 30-1 40s.  SVT was converted into sinus rhythm after IV Dilaudid and 6 mg IV metoprolol.  After cardioversion, ECG shows sinus rhythm without preexcitation.  She was started on propranolol with later switched to metoprolol 25 mg daily.  Patient reports intermittent palpitations with heart rate of 120 - 130 bpm at resting status and has exertional shortness of breath.  She tolerates metoprolol.  Her echocardiogram in 12/2024 showed preserved LVEF, no significant valvular disease.  She denies family history of sudden cardiac death.        ROS (+ highlighted in red):  General--Negative for fatigue, weight loss or weight gain  Cardiovascular--intermittent palpitations, negative for CP, orthopnea, PND, syncope    Past Medical History:   Diagnosis Date    Dyslipidemia 9/20/2023    Female cystocele 2/13/2015    Graves disease 11/11/2020    Hypertension [id number]    put on bedrest at 4mths    Thyrotoxicosis 11/1/2020       Past Surgical History:   Procedure Laterality Date    REPEAT C SECTION W TUBAL LIGATION  2/20/2014    Performed by Barbara Velasco M.D. at [facility]    DILATION AND CURETTAGE  2013    D & C    PRIMARY C SECTION  2006    position of baby, face first     OTHER ABDOMINAL SURGERY      c section       Social History     Socioeconomic History    Marital status:      Spouse name: Not on file    Number of children: Not on file    Years of education: Not on file    Highest education level: 10th grade   Occupational  History    Not on file   Tobacco Use    Smoking status: Former     Current packs/day: 0.00     Average packs/day: 0.3 packs/day for 3.0 years (0.8 ttl pk-yrs)     Types: Cigarettes     Start date: 3/19/2010     Quit date: 3/19/2013     Years since quittin.0    Smokeless tobacco: Never    Tobacco comments:     1 cig dialy    Vaping Use    Vaping status: Never Used   Substance and Sexual Activity    Alcohol use: No    Drug use: No    Sexual activity: Yes     Partners: Male     Birth control/protection: Female Sterilization     Comment: FOB involved and supportive.   Other Topics Concern    Not on file   Social History Narrative    Not on file     Social Drivers of Health     Financial Resource Strain: Low Risk  (8/15/2023)    Overall Financial Resource Strain (CARDIA)     Difficulty of Paying Living Expenses: Not hard at all   Food Insecurity: No Food Insecurity (2024)    Hunger Vital Sign     Worried About Running Out of Food in the Last Year: Never true     Ran Out of Food in the Last Year: Never true   Transportation Needs: No Transportation Needs (2024)    PRAPARE - Transportation     Lack of Transportation (Medical): No     Lack of Transportation (Non-Medical): No   Physical Activity: Sufficiently Active (8/15/2023)    Exercise Vital Sign     Days of Exercise per Week: 3 days     Minutes of Exercise per Session: 60 min   Stress: No Stress Concern Present (8/15/2023)    [employer] - Occupational Stress Questionnaire     Feeling of Stress : Only a little   Social Connections: Moderately Isolated (8/15/2023)    Social Connection and Isolation Panel [NHANES]     Frequency of Communication with Friends and Family: More than three times a week     Frequency of Social Gatherings with Friends and Family: Once a week     Attends Yarsanism Services: Never     Active Member of Clubs or Organizations: No     Attends Club or Organization Meetings: Never     Marital Status:   "  Intimate Partner Violence: Not At Risk (12/7/2024)    Humiliation, Afraid, Rape, and Kick questionnaire     Fear of Current or Ex-Partner: No     Emotionally Abused: No     Physically Abused: No     Sexually Abused: No   Housing Stability: Low Risk  (12/7/2024)    Housing Stability Vital Sign     Unable to Pay for Housing in the Last Year: No     Number of Times Moved in the Last Year: 0     Homeless in the Last Year: No       Family History   Problem Relation Age of Onset    Cancer Mother         unknown    Diabetes Father         diet and pills    Hypertension Father     Diabetes Sister         eldest sister, controlled with diet and pills    COPD Sister     Other Paternal Grandmother         kidney transplant       No Known Allergies    Current Outpatient Medications   Medication Sig Dispense Refill    Cholecalciferol (VITAMIN D-3 PO) Take  by mouth.      metoprolol SR (TOPROL XL) 25 MG TABLET SR 24 HR Take 1.5 Tablets by mouth every day. 90 Tablet 3    Tirzepatide-Weight Management (ZEPBOUND) 2.5 MG/0.5ML Solution vial Inject 0.5 mL under the skin every 7 days for 196 days. 2 mL 6    losartan (COZAAR) 100 MG Tab Take 1 Tablet by mouth every day. 90 Tablet 3    atorvastatin (LIPITOR) 40 MG Tab Take 1 Tablet by mouth every evening. 90 Tablet 1     No current facility-administered medications for this visit.       Physical Exam:  Vitals:    04/15/25 1609   BP: 122/74   BP Location: Left arm   Patient Position: Sitting   BP Cuff Size: Adult   Pulse: 88   Resp: 14   SpO2: 100%   Weight: 90.3 kg (199 lb)   Height: 1.626 m (5' 4\")     General appearance: NAD, conversant  HEENT: PERRL, neck is supple with FROM  Lungs: Clear to auscultation, normal respiratory effort  CV: RRR, no murmurs/rubs/gallops, no JVD  Abdomen: Soft, non-tender with normal bowel sounds  Extremities: No peripheral edema, no clubbing or cyanosis  Skin: No rash, lesions, or ulcers  Psych: Alert and oriented to person, place and time    Data:    Lab " Results   Component Value Date/Time    CHOLSTRLTOT 214 (H) 11/21/2024 05:44 AM     (H) 11/21/2024 05:44 AM    HDL 51 11/21/2024 05:44 AM    TRIGLYCERIDE 179 (H) 11/21/2024 05:44 AM       Lab Results   Component Value Date/Time    SODIUM 140 12/08/2024 12:07 AM    POTASSIUM 3.5 (L) 12/08/2024 12:07 AM    CHLORIDE 107 12/08/2024 12:07 AM    CO2 19 (L) 12/08/2024 12:07 AM    GLUCOSE 145 (H) 12/08/2024 12:07 AM    BUN 8 12/08/2024 12:07 AM    CREATININE 0.54 12/08/2024 12:07 AM    CREATININE 0.7 08/06/2006 01:40 AM     Lab Results   Component Value Date/Time    ALKPHOSPHAT 57 12/08/2024 12:07 AM    ASTSGOT 11 (L) 12/08/2024 12:07 AM    ALTSGPT 6 12/08/2024 12:07 AM    TBILIRUBIN 0.4 12/08/2024 12:07 AM      Lab Results   Component Value Date/Time    BNPBTYPENAT 19 02/11/2019 05:32 PM         TSH WNL    Prior echo reviewed:  Preserved LVEF, no significant valvular disease    EKG interpreted by me:  SR, no preexcitation  SVT on 12/7/2024        Impression/Plan:  1. SVT (supraventricular tachycardia) (HCC)  metoprolol SR (TOPROL XL) 25 MG TABLET SR 24 HR      2. Hypertension    - I discussed with Ms.Vanessa Bella Mcmahan the etiologies of SVT including AVNRT, AVRT, AT and management including medical treatment or benefits of EPS/potential SVT ablation, Risks include vascular access bleeding or pain, infection, stroke, myocardial infarction, cardiac tamponade, pericardial effusion, myocardial perforation, major bleeding, phrenic nerve damage, heart block requiring a pacemaker, and death. Risk of major adverse event is ~1%. Success rates long term are 80-95% depending on the arrhythmia induced and the acute success in the EP lab.  Patient understands and agrees to proceed.    - I will increase metoprolol from 25 mg qd to 37.5 mg for better heart rate control.    Skye Phillips MD, PhD  Cardiac Electrophysiologist

## 2025-04-17 NOTE — TELEPHONE ENCOUNTER
Caller: Melina Mcmahan    Topic/issue: returning call to [facility]. Unable to reach to transfer. Please call back at number listed below.    Callback Number: [phone]    Thank you,  Lizbet VASQUEZ

## 2025-04-18 NOTE — TELEPHONE ENCOUNTER
Patient scheduled for SVT ablation w/anesthesia on 9-9-25 with Dr. Phillips. Patient has been instructed to check in at 5:30 for 7:30 case time. Hold Metoprolol 3 days, hold Zepbound 7 days. Message sent to authorizations. Carto scheduled

## 2025-05-30 ENCOUNTER — PATIENT MESSAGE (OUTPATIENT)
Dept: MEDICAL GROUP | Facility: LAB | Age: 35
End: 2025-05-30
Payer: COMMERCIAL

## 2025-05-30 DIAGNOSIS — I10 PRIMARY HYPERTENSION: Primary | ICD-10-CM

## 2025-05-30 DIAGNOSIS — I47.10 SVT (SUPRAVENTRICULAR TACHYCARDIA) (HCC): ICD-10-CM

## 2025-05-30 RX ORDER — HYDRALAZINE HYDROCHLORIDE 10 MG/1
10 TABLET, FILM COATED ORAL 3 TIMES DAILY PRN
Qty: 30 TABLET | Refills: 0 | Status: SHIPPED | OUTPATIENT
Start: 2025-05-30

## 2025-05-30 RX ORDER — METOPROLOL SUCCINATE 25 MG/1
37.5 TABLET, EXTENDED RELEASE ORAL DAILY
Qty: 90 TABLET | Refills: 3 | Status: SHIPPED | OUTPATIENT
Start: 2025-05-30

## 2025-06-09 DIAGNOSIS — I10 HYPERTENSION, UNSPECIFIED TYPE: ICD-10-CM

## 2025-06-09 RX ORDER — LOSARTAN POTASSIUM 100 MG/1
100 TABLET ORAL DAILY
Qty: 90 TABLET | Refills: 3 | Status: SHIPPED | OUTPATIENT
Start: 2025-06-09

## 2025-06-13 DIAGNOSIS — E78.5 DYSLIPIDEMIA: ICD-10-CM

## 2025-06-13 RX ORDER — ATORVASTATIN CALCIUM 40 MG/1
40 TABLET, FILM COATED ORAL EVERY EVENING
Qty: 90 TABLET | Refills: 0 | Status: SHIPPED | OUTPATIENT
Start: 2025-06-13

## 2025-07-28 DIAGNOSIS — I47.10 SVT (SUPRAVENTRICULAR TACHYCARDIA) (HCC): ICD-10-CM

## 2025-07-28 RX ORDER — METOPROLOL SUCCINATE 25 MG/1
37.5 TABLET, EXTENDED RELEASE ORAL DAILY
Qty: 90 TABLET | Refills: 3 | Status: SHIPPED | OUTPATIENT
Start: 2025-07-28

## 2025-07-29 ENCOUNTER — HOSPITAL ENCOUNTER (OUTPATIENT)
Dept: LAB | Facility: MEDICAL CENTER | Age: 35
End: 2025-07-29
Attending: STUDENT IN AN ORGANIZED HEALTH CARE EDUCATION/TRAINING PROGRAM
Payer: COMMERCIAL

## 2025-07-29 DIAGNOSIS — Z00.00 ANNUAL PHYSICAL EXAM: ICD-10-CM

## 2025-07-29 LAB
25(OH)D3 SERPL-MCNC: 31 NG/ML (ref 30–100)
ALBUMIN SERPL BCP-MCNC: 4.3 G/DL (ref 3.2–4.9)
ALBUMIN/GLOB SERPL: 1.7 G/DL
ALP SERPL-CCNC: 52 U/L (ref 30–99)
ALT SERPL-CCNC: 7 U/L (ref 2–50)
ANION GAP SERPL CALC-SCNC: 12 MMOL/L (ref 7–16)
AST SERPL-CCNC: 17 U/L (ref 12–45)
BASOPHILS # BLD AUTO: 0.8 % (ref 0–1.8)
BASOPHILS # BLD: 0.05 K/UL (ref 0–0.12)
BILIRUB SERPL-MCNC: 0.4 MG/DL (ref 0.1–1.5)
BUN SERPL-MCNC: 14 MG/DL (ref 8–22)
CALCIUM ALBUM COR SERPL-MCNC: 8.5 MG/DL (ref 8.5–10.5)
CALCIUM SERPL-MCNC: 8.7 MG/DL (ref 8.5–10.5)
CHLORIDE SERPL-SCNC: 109 MMOL/L (ref 96–112)
CHOLEST SERPL-MCNC: 141 MG/DL (ref 100–199)
CO2 SERPL-SCNC: 19 MMOL/L (ref 20–33)
CREAT SERPL-MCNC: 0.71 MG/DL (ref 0.5–1.4)
EOSINOPHIL # BLD AUTO: 0.27 K/UL (ref 0–0.51)
EOSINOPHIL NFR BLD: 4.3 % (ref 0–6.9)
ERYTHROCYTE [DISTWIDTH] IN BLOOD BY AUTOMATED COUNT: 45.4 FL (ref 35.9–50)
EST. AVERAGE GLUCOSE BLD GHB EST-MCNC: 120 MG/DL
GFR SERPLBLD CREATININE-BSD FMLA CKD-EPI: 114 ML/MIN/1.73 M 2
GLOBULIN SER CALC-MCNC: 2.6 G/DL (ref 1.9–3.5)
GLUCOSE SERPL-MCNC: 80 MG/DL (ref 65–99)
HBA1C MFR BLD: 5.8 % (ref 4–5.6)
HCT VFR BLD AUTO: 34.2 % (ref 37–47)
HDLC SERPL-MCNC: 41 MG/DL
HGB BLD-MCNC: 10.3 G/DL (ref 12–16)
IMM GRANULOCYTES # BLD AUTO: 0.01 K/UL (ref 0–0.11)
IMM GRANULOCYTES NFR BLD AUTO: 0.2 % (ref 0–0.9)
LDLC SERPL CALC-MCNC: 75 MG/DL
LYMPHOCYTES # BLD AUTO: 2.57 K/UL (ref 1–4.8)
LYMPHOCYTES NFR BLD: 41.3 % (ref 22–41)
MCH RBC QN AUTO: 22.7 PG (ref 27–33)
MCHC RBC AUTO-ENTMCNC: 30.1 G/DL (ref 32.2–35.5)
MCV RBC AUTO: 75.5 FL (ref 81.4–97.8)
MONOCYTES # BLD AUTO: 0.56 K/UL (ref 0–0.85)
MONOCYTES NFR BLD AUTO: 9 % (ref 0–13.4)
NEUTROPHILS # BLD AUTO: 2.77 K/UL (ref 1.82–7.42)
NEUTROPHILS NFR BLD: 44.4 % (ref 44–72)
NRBC # BLD AUTO: 0 K/UL
NRBC BLD-RTO: 0 /100 WBC (ref 0–0.2)
PLATELET # BLD AUTO: 315 K/UL (ref 164–446)
PMV BLD AUTO: 11.6 FL (ref 9–12.9)
POTASSIUM SERPL-SCNC: 4.1 MMOL/L (ref 3.6–5.5)
PROT SERPL-MCNC: 6.9 G/DL (ref 6–8.2)
RBC # BLD AUTO: 4.53 M/UL (ref 4.2–5.4)
SODIUM SERPL-SCNC: 140 MMOL/L (ref 135–145)
TRIGL SERPL-MCNC: 126 MG/DL (ref 0–149)
TSH SERPL DL<=0.005 MIU/L-ACNC: 1.7 UIU/ML (ref 0.38–5.33)
WBC # BLD AUTO: 6.2 K/UL (ref 4.8–10.8)

## 2025-07-29 PROCEDURE — 80053 COMPREHEN METABOLIC PANEL: CPT

## 2025-07-29 PROCEDURE — 82306 VITAMIN D 25 HYDROXY: CPT

## 2025-07-29 PROCEDURE — 85025 COMPLETE CBC W/AUTO DIFF WBC: CPT

## 2025-07-29 PROCEDURE — 83036 HEMOGLOBIN GLYCOSYLATED A1C: CPT

## 2025-07-29 PROCEDURE — 80061 LIPID PANEL: CPT

## 2025-07-29 PROCEDURE — 36415 COLL VENOUS BLD VENIPUNCTURE: CPT

## 2025-07-29 PROCEDURE — 84443 ASSAY THYROID STIM HORMONE: CPT

## 2025-07-31 ENCOUNTER — OFFICE VISIT (OUTPATIENT)
Dept: MEDICAL GROUP | Facility: LAB | Age: 35
End: 2025-07-31
Payer: COMMERCIAL

## 2025-07-31 VITALS
WEIGHT: 195.6 LBS | TEMPERATURE: 97.2 F | HEART RATE: 94 BPM | SYSTOLIC BLOOD PRESSURE: 130 MMHG | RESPIRATION RATE: 18 BRPM | BODY MASS INDEX: 33.39 KG/M2 | OXYGEN SATURATION: 98 % | DIASTOLIC BLOOD PRESSURE: 72 MMHG | HEIGHT: 64 IN

## 2025-07-31 DIAGNOSIS — R73.03 PREDIABETES: ICD-10-CM

## 2025-07-31 DIAGNOSIS — R42 VERTIGO: Primary | ICD-10-CM

## 2025-07-31 DIAGNOSIS — Z00.00 ANNUAL PHYSICAL EXAM: ICD-10-CM

## 2025-07-31 DIAGNOSIS — D50.9 MICROCYTIC ANEMIA: ICD-10-CM

## 2025-07-31 RX ORDER — MECLIZINE HYDROCHLORIDE 25 MG/1
25 TABLET ORAL 3 TIMES DAILY PRN
Qty: 30 TABLET | Refills: 0 | Status: SHIPPED | OUTPATIENT
Start: 2025-07-31

## 2025-07-31 SDOH — ECONOMIC STABILITY: FOOD INSECURITY: WITHIN THE PAST 12 MONTHS, THE FOOD YOU BOUGHT JUST DIDN'T LAST AND YOU DIDN'T HAVE MONEY TO GET MORE.: NEVER TRUE

## 2025-07-31 SDOH — HEALTH STABILITY: PHYSICAL HEALTH: ON AVERAGE, HOW MANY DAYS PER WEEK DO YOU ENGAGE IN MODERATE TO STRENUOUS EXERCISE (LIKE A BRISK WALK)?: 3 DAYS

## 2025-07-31 SDOH — HEALTH STABILITY: PHYSICAL HEALTH: ON AVERAGE, HOW MANY MINUTES DO YOU ENGAGE IN EXERCISE AT THIS LEVEL?: 60 MIN

## 2025-07-31 SDOH — ECONOMIC STABILITY: INCOME INSECURITY: HOW HARD IS IT FOR YOU TO PAY FOR THE VERY BASICS LIKE FOOD, HOUSING, MEDICAL CARE, AND HEATING?: NOT HARD AT ALL

## 2025-07-31 SDOH — ECONOMIC STABILITY: INCOME INSECURITY: IN THE LAST 12 MONTHS, WAS THERE A TIME WHEN YOU WERE NOT ABLE TO PAY THE MORTGAGE OR RENT ON TIME?: NO

## 2025-07-31 SDOH — ECONOMIC STABILITY: FOOD INSECURITY: WITHIN THE PAST 12 MONTHS, YOU WORRIED THAT YOUR FOOD WOULD RUN OUT BEFORE YOU GOT MONEY TO BUY MORE.: NEVER TRUE

## 2025-07-31 SDOH — HEALTH STABILITY: MENTAL HEALTH
STRESS IS WHEN SOMEONE FEELS TENSE, NERVOUS, ANXIOUS, OR CAN'T SLEEP AT NIGHT BECAUSE THEIR MIND IS TROUBLED. HOW STRESSED ARE YOU?: NOT AT ALL

## 2025-07-31 ASSESSMENT — FIBROSIS 4 INDEX: FIB4 SCORE: 0.71

## 2025-07-31 ASSESSMENT — SOCIAL DETERMINANTS OF HEALTH (SDOH)
HOW MANY DRINKS CONTAINING ALCOHOL DO YOU HAVE ON A TYPICAL DAY WHEN YOU ARE DRINKING: PATIENT DOES NOT DRINK
HOW OFTEN DO YOU ATTENT MEETINGS OF THE CLUB OR ORGANIZATION YOU BELONG TO?: NEVER
HOW OFTEN DO YOU GET TOGETHER WITH FRIENDS OR RELATIVES?: TWICE A WEEK
HOW OFTEN DO YOU ATTEND CHURCH OR RELIGIOUS SERVICES?: NEVER
DO YOU BELONG TO ANY CLUBS OR ORGANIZATIONS SUCH AS CHURCH GROUPS UNIONS, FRATERNAL OR ATHLETIC GROUPS, OR SCHOOL GROUPS?: NO
HOW OFTEN DO YOU ATTENT MEETINGS OF THE CLUB OR ORGANIZATION YOU BELONG TO?: NEVER
IN A TYPICAL WEEK, HOW MANY TIMES DO YOU TALK ON THE PHONE WITH FAMILY, FRIENDS, OR NEIGHBORS?: THREE TIMES A WEEK
HOW OFTEN DO YOU HAVE A DRINK CONTAINING ALCOHOL: NEVER
WITHIN THE PAST 12 MONTHS, YOU WORRIED THAT YOUR FOOD WOULD RUN OUT BEFORE YOU GOT THE MONEY TO BUY MORE: NEVER TRUE
HOW OFTEN DO YOU GET TOGETHER WITH FRIENDS OR RELATIVES?: TWICE A WEEK
HOW HARD IS IT FOR YOU TO PAY FOR THE VERY BASICS LIKE FOOD, HOUSING, MEDICAL CARE, AND HEATING?: NOT HARD AT ALL
IN THE PAST 12 MONTHS, HAS THE ELECTRIC, GAS, OIL, OR WATER COMPANY THREATENED TO SHUT OFF SERVICE IN YOUR HOME?: NO
DO YOU BELONG TO ANY CLUBS OR ORGANIZATIONS SUCH AS CHURCH GROUPS UNIONS, FRATERNAL OR ATHLETIC GROUPS, OR SCHOOL GROUPS?: NO
IN A TYPICAL WEEK, HOW MANY TIMES DO YOU TALK ON THE PHONE WITH FAMILY, FRIENDS, OR NEIGHBORS?: THREE TIMES A WEEK
HOW OFTEN DO YOU HAVE SIX OR MORE DRINKS ON ONE OCCASION: NEVER
HOW OFTEN DO YOU ATTEND CHURCH OR RELIGIOUS SERVICES?: NEVER

## 2025-07-31 ASSESSMENT — ENCOUNTER SYMPTOMS
CHILLS: 0
FEVER: 0
SHORTNESS OF BREATH: 0

## 2025-07-31 ASSESSMENT — LIFESTYLE VARIABLES
AUDIT-C TOTAL SCORE: 0
SKIP TO QUESTIONS 9-10: 1
HOW OFTEN DO YOU HAVE A DRINK CONTAINING ALCOHOL: NEVER
HOW OFTEN DO YOU HAVE SIX OR MORE DRINKS ON ONE OCCASION: NEVER
HOW MANY STANDARD DRINKS CONTAINING ALCOHOL DO YOU HAVE ON A TYPICAL DAY: PATIENT DOES NOT DRINK

## 2025-08-01 NOTE — PROGRESS NOTES
Subjective:     CC:   Chief Complaint   Patient presents with    Annual Exam       HPI:   Melina Mcmahan is a 35 y.o. female who presents for annual exam    Patient has GYN provider: No   Last Pap Smear: 9 years   H/O Abnormal Pap: No  Last Mammogram: no  Last Bone Density Test: na  Last Colorectal Cancer Screening: na  Last Tdap:   Received HPV series: Aged out     Exercise: she has roughly 240 minutes of exercise/week  Diet: portion control      No LMP recorded.  Hx STDs: No  Birth control: tubal ligation   Menses every month with 6 days with moderate bleeding.  Reports moderate cramping and does take OTC analgesics for cramps.  No significant bloating/fluid retention, pelvic pain, or dyspareunia. No abnormal vaginal discharge.  No breast tenderness, mass, nipple discharge, changes in size or contour, or abnormal cyclic discomfort.    OB History    Para Term  AB Living   6 5 4 1 1 5   SAB IAB Ectopic Molar Multiple Live Births   0 0 0 0 0 5      She  reports being sexually active and has had partner(s) who are male. She reports using the following method of birth control/protection: Female Sterilization.    She  has a past medical history of Dyslipidemia (2023), Female cystocele (2015), Graves disease (2020), Hypertension (9148-5833), and Thyrotoxicosis (2020).    She has no past medical history of Addisons disease ([facility]), Adrenal disorder ([facility]), Allergy, Anemia, Anxiety, Arrhythmia, Arthritis, ASTHMA, Blood transfusion, Cancer ([facility]), CATARACT, CHF (congestive heart failure) ([facility]), Clotting disorder ([facility]), COPD, Cushings syndrome ([facility]), Depression, Diabetes, Diabetic neuropathy ([facility]), Dialysis patient ([facility]), EMPHYSEMA, GERD (gastroesophageal reflux disease), Glaucoma, Headache(784.0), Heart attack ([facility]), Heart murmur, HIV (human immunodeficiency virus infection), IBD (inflammatory bowel disease), Jaundice, Kidney disease, Meningitis, Migraine, Muscle disorder,  "OSTEOPOROSIS, Pacemaker, Parathyroid disorder (HCC), Pituitary disease (HCC), Pneumonia, Psychiatric problem, Rheumatic fever, Seizure (HCC), Sickle cell disease (HCC), Stroke (HCC), Substance abuse (HCC), Tuberculosis, Ulcer, or Urinary tract infection, site not specified.  She  has a past surgical history that includes other abdominal surgery; dilation and curettage (2013); primary c section (2006); and repeat c section w tubal ligation (2/20/2014).    Family History   Problem Relation Age of Onset    Cancer Mother         unknown    Diabetes Father         diet and pills    Hypertension Father     Diabetes Sister         eldest sister, controlled with diet and pills    COPD Sister     Other Paternal Grandmother         kidney transplant     Social History[1]    Patient Active Problem List    Diagnosis Date Noted    Metabolic syndrome 03/11/2025    Sleep apnea 01/02/2025    SVT (supraventricular tachycardia) ([facility]) 12/07/2024    Elevated troponin likely demand ischemia 11/20/2024    Pain in both wrists 02/07/2024    Vitamin D deficiency 02/07/2024    Prediabetes 09/20/2023    Dyslipidemia 09/20/2023    Family history of diabetes mellitus 08/16/2023    Palpitations 08/04/2022    Class 1 obesity due to excess calories with serious comorbidity and body mass index (BMI) of 34.0 to 34.9 in adult 09/08/2021    Acanthosis nigricans 09/08/2021    Toxic diffuse goiter without crisis 09/08/2021    Graves disease 11/11/2020    Hypertension 11/01/2020    Female cystocele 02/13/2015    Advance care planning 02/20/2014     Current Medications[2]  Allergies[3]    Review of Systems   Review of Systems   Constitutional:  Negative for chills and fever.   Respiratory:  Negative for shortness of breath.    Cardiovascular:  Negative for chest pain.         Objective:   /72 (BP Location: Right arm, Patient Position: Sitting, BP Cuff Size: Adult long)   Pulse 94   Temp 36.2 °C (97.2 °F) (Temporal)   Resp 18   Ht 1.626 m (5' 4\") "   Wt 88.7 kg (195 lb 9.6 oz)   SpO2 98%   BMI 33.57 kg/m²     Wt Readings from Last 4 Encounters:   07/31/25 88.7 kg (195 lb 9.6 oz)   04/15/25 90.3 kg (199 lb)   03/11/25 93 kg (205 lb)   12/16/24 92.3 kg (203 lb 6.4 oz)         Physical Exam:  Physical Exam  Constitutional:       Appearance: Normal appearance.   HENT:      Head: Normocephalic and atraumatic.      Right Ear: Tympanic membrane and ear canal normal.      Left Ear: Tympanic membrane and ear canal normal.      Mouth/Throat:      Mouth: Mucous membranes are moist.      Pharynx: Oropharynx is clear.   Eyes:      Extraocular Movements: Extraocular movements intact.      Pupils: Pupils are equal, round, and reactive to light.   Neck:      Thyroid: No thyromegaly.   Cardiovascular:      Rate and Rhythm: Normal rate and regular rhythm.      Heart sounds: Normal heart sounds.   Pulmonary:      Effort: Pulmonary effort is normal.      Breath sounds: Normal breath sounds.   Abdominal:      General: Abdomen is flat. Bowel sounds are normal.      Palpations: Abdomen is soft. There is no mass.      Tenderness: There is no abdominal tenderness. There is no guarding.   Musculoskeletal:      Cervical back: Normal range of motion and neck supple.      Right lower leg: No edema.      Left lower leg: No edema.   Lymphadenopathy:      Cervical: No cervical adenopathy.   Skin:     General: Skin is warm and dry.   Neurological:      General: No focal deficit present.      Mental Status: She is alert.           Assessment and Plan:     1. Vertigo  - meclizine (ANTIVERT) 25 MG Tab; Take 1 Tablet by mouth 3 times a day as needed for Vertigo.  Dispense: 30 Tablet; Refill: 0    2. Microcytic anemia  - CBC WITH DIFFERENTIAL; Future  - FERRITIN; Future  - IRON; Future  - IRON/TOTAL IRON BIND; Future    3. Prediabetes  - HEMOGLOBIN A1C; Future    4. Annual physical exam      Health maintenance:    Labs per orders  Immunizations per orders  Age-appropriate guidance discussed  including diet and exercise  Discussed benefits of resistance training     Follow-up: 6 month labs        [1]   Social History  Tobacco Use    Smoking status: Former     Current packs/day: 0.00     Average packs/day: 0.3 packs/day for 3.0 years (0.8 ttl pk-yrs)     Types: Cigarettes     Start date: 3/19/2010     Quit date: 3/19/2013     Years since quittin.3    Smokeless tobacco: Never    Tobacco comments:     1 cig dialy    Vaping Use    Vaping status: Never Used   Substance Use Topics    Alcohol use: No    Drug use: No   [2]   Current Outpatient Medications   Medication Sig Dispense Refill    meclizine (ANTIVERT) 25 MG Tab Take 1 Tablet by mouth 3 times a day as needed for Vertigo. 30 Tablet 0    hydrALAZINE (APRESOLINE) 10 MG Tab Take 1 Tablet by mouth 3 times a day as needed (elevated blood pressure). 30 Tablet 0    metoprolol SR (TOPROL XL) 25 MG TABLET SR 24 HR Take 1.5 Tablets by mouth every day. 90 Tablet 3    atorvastatin (LIPITOR) 40 MG Tab TAKE 1 TABLET BY MOUTH ONCE DAILY IN THE EVENING 90 Tablet 0    losartan (COZAAR) 100 MG Tab Take 1 Tablet by mouth every day. 90 Tablet 3    Cholecalciferol (VITAMIN D-3 PO) Take  by mouth. (Patient not taking: Reported on 2025)      Tirzepatide-Weight Management (ZEPBOUND) 2.5 MG/0.5ML Solution vial Inject 0.5 mL under the skin every 7 days for 196 days. 2 mL 6     No current facility-administered medications for this visit.   [3] No Known Allergies